# Patient Record
Sex: FEMALE | Race: WHITE | NOT HISPANIC OR LATINO
[De-identification: names, ages, dates, MRNs, and addresses within clinical notes are randomized per-mention and may not be internally consistent; named-entity substitution may affect disease eponyms.]

---

## 2017-05-02 ENCOUNTER — APPOINTMENT (OUTPATIENT)
Dept: SURGERY | Facility: CLINIC | Age: 57
End: 2017-05-02

## 2017-05-02 ENCOUNTER — LABORATORY RESULT (OUTPATIENT)
Age: 57
End: 2017-05-02

## 2017-05-02 VITALS
OXYGEN SATURATION: 97 % | HEIGHT: 61 IN | DIASTOLIC BLOOD PRESSURE: 76 MMHG | TEMPERATURE: 98.4 F | SYSTOLIC BLOOD PRESSURE: 114 MMHG | BODY MASS INDEX: 25.86 KG/M2 | WEIGHT: 137 LBS | HEART RATE: 72 BPM

## 2017-05-09 ENCOUNTER — OUTPATIENT (OUTPATIENT)
Dept: OUTPATIENT SERVICES | Facility: HOSPITAL | Age: 57
LOS: 1 days | End: 2017-05-09
Payer: MEDICAID

## 2017-05-09 ENCOUNTER — APPOINTMENT (OUTPATIENT)
Dept: CT IMAGING | Facility: HOSPITAL | Age: 57
End: 2017-05-09

## 2017-05-09 DIAGNOSIS — R22.1 LOCALIZED SWELLING, MASS AND LUMP, NECK: ICD-10-CM

## 2017-05-09 PROCEDURE — 70491 CT SOFT TISSUE NECK W/DYE: CPT

## 2017-05-23 ENCOUNTER — APPOINTMENT (OUTPATIENT)
Dept: SURGERY | Facility: CLINIC | Age: 57
End: 2017-05-23

## 2017-05-23 VITALS
BODY MASS INDEX: 25.86 KG/M2 | WEIGHT: 137 LBS | SYSTOLIC BLOOD PRESSURE: 110 MMHG | DIASTOLIC BLOOD PRESSURE: 68 MMHG | HEART RATE: 58 BPM | HEIGHT: 61 IN

## 2017-06-27 ENCOUNTER — APPOINTMENT (OUTPATIENT)
Dept: SURGERY | Facility: CLINIC | Age: 57
End: 2017-06-27

## 2017-07-11 ENCOUNTER — APPOINTMENT (OUTPATIENT)
Dept: MAMMOGRAPHY | Facility: IMAGING CENTER | Age: 57
End: 2017-07-11

## 2017-07-11 ENCOUNTER — APPOINTMENT (OUTPATIENT)
Dept: ULTRASOUND IMAGING | Facility: IMAGING CENTER | Age: 57
End: 2017-07-11

## 2017-07-11 ENCOUNTER — OUTPATIENT (OUTPATIENT)
Dept: OUTPATIENT SERVICES | Facility: HOSPITAL | Age: 57
LOS: 1 days | End: 2017-07-11
Payer: MEDICAID

## 2017-07-11 DIAGNOSIS — Z00.8 ENCOUNTER FOR OTHER GENERAL EXAMINATION: ICD-10-CM

## 2017-07-11 PROCEDURE — 77063 BREAST TOMOSYNTHESIS BI: CPT | Mod: 26

## 2017-07-11 PROCEDURE — 77063 BREAST TOMOSYNTHESIS BI: CPT

## 2017-07-11 PROCEDURE — G0202: CPT | Mod: 26

## 2017-07-11 PROCEDURE — 77067 SCR MAMMO BI INCL CAD: CPT

## 2017-07-18 ENCOUNTER — EMERGENCY (EMERGENCY)
Facility: HOSPITAL | Age: 57
LOS: 1 days | Discharge: HOME | End: 2017-07-18
Attending: EMERGENCY MEDICINE
Payer: MEDICAID

## 2017-07-18 VITALS
SYSTOLIC BLOOD PRESSURE: 148 MMHG | HEART RATE: 74 BPM | WEIGHT: 149.91 LBS | DIASTOLIC BLOOD PRESSURE: 71 MMHG | OXYGEN SATURATION: 99 % | RESPIRATION RATE: 15 BRPM | TEMPERATURE: 32 F
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
## 2017-07-18 DIAGNOSIS — T18.9XXA FOREIGN BODY OF ALIMENTARY TRACT, PART UNSPECIFIED, INITIAL ENCOUNTER: ICD-10-CM

## 2017-07-18 PROCEDURE — 99284 EMERGENCY DEPT VISIT MOD MDM: CPT | Mod: 25

## 2017-07-18 PROCEDURE — 71250 CT THORAX DX C-: CPT | Mod: 26

## 2017-07-18 PROCEDURE — 70490 CT SOFT TISSUE NECK W/O DYE: CPT | Mod: 26

## 2017-07-18 NOTE — ED PROVIDER NOTE - OBJECTIVE STATEMENT
58 y/o F with PMHx of breast cancer presents to ED c/o potential foreign body stuck in throat. Per daughter, pt was eating fish today when she swallowed a piece w/ a bone and described feeling like it was stuck and poking her. Pt tried drinking water and eating bread later, but could not relieve the feeling. Pt has not been able to swallow. Denies any vomiting or any other complaints. NKDA. 58 y/o F with PMHx of breast cancer presents to ED c/o potential foreign body stuck in throat. Per daughter, pt was eating fish today when she swallowed a piece w/ a bone and described feeling like it was stuck and poking her. Pt tried drinking water and eating bread later, but could not relieve the feeling. Pt has not been able to swallow without feeling this sensation. No drooling/ able to handle secretions, Denies any vomiting or any other complaints. NKDA.

## 2017-07-18 NOTE — ED ADULT NURSE NOTE - OBJECTIVE STATEMENT
PT P/W ESOGEAL FOREIGN she feels fish bone stuck and swallowed a big piece of bread to push it down and she feel stubbing sharp pain around chest area"PT P/W PT P/W ESOPHAGEAL FOREIGN BODY PT ATE FISH TONIGHT AND FEELS A BONE STUCK. SHE ATE BREAD AND WATER TO TRY AND PUSH IT DOWN WITH NO RELIEF .

## 2017-07-18 NOTE — ED PROVIDER NOTE - MEDICAL DECISION MAKING DETAILS
56 y/o F pt with potential foreign body in throat. Will CT-scan neck and chest and re-assess. 56 y/o F pt with potential foreign body in throat/esophagus. Will CT-scan neck and chest and re-assess, poss ENT or GI consult.

## 2017-07-18 NOTE — ED PROVIDER NOTE - PROGRESS NOTE DETAILS
Discussed CT finding w patient and her daughter. Fishbones already in stomach, so will likely just pass on their own. S/s likely scratch, will Rx medication for symptom relief, can follow up w GI prn

## 2017-07-18 NOTE — ED ADULT NURSE NOTE - CHIEF COMPLAINT QUOTE
as per daughter " she feels fish bone stuck and swallowed a big piece of bread to push it down and she feel stubbing sharp pain around chest area"

## 2017-07-18 NOTE — ED ADULT TRIAGE NOTE - CHIEF COMPLAINT QUOTE
as per daughter " she feels fish bone stuck and swallowed a big piece of bread to push it down and she she feel stubbing sharp pain around chest area" as per daughter " she feels fish bone stuck and swallowed a big piece of bread to push it down and she feel stubbing sharp pain around chest area"

## 2017-07-19 PROCEDURE — 70490 CT SOFT TISSUE NECK W/O DYE: CPT

## 2017-07-19 PROCEDURE — 99284 EMERGENCY DEPT VISIT MOD MDM: CPT

## 2017-07-19 PROCEDURE — 71250 CT THORAX DX C-: CPT

## 2017-07-19 RX ORDER — SUCRALFATE 1 G
10 TABLET ORAL
Qty: 280 | Refills: 0
Start: 2017-07-19 | End: 2017-07-26

## 2017-07-19 RX ORDER — SUCRALFATE 1 G
1 TABLET ORAL ONCE
Qty: 0 | Refills: 0 | Status: COMPLETED | OUTPATIENT
Start: 2017-07-19 | End: 2017-07-19

## 2017-07-19 RX ADMIN — Medication 30 MILLILITER(S): at 00:53

## 2017-08-10 ENCOUNTER — EMERGENCY (EMERGENCY)
Facility: HOSPITAL | Age: 57
LOS: 1 days | Discharge: HOME | End: 2017-08-10
Attending: EMERGENCY MEDICINE
Payer: MEDICAID

## 2017-08-10 VITALS
TEMPERATURE: 99 F | HEART RATE: 66 BPM | DIASTOLIC BLOOD PRESSURE: 76 MMHG | OXYGEN SATURATION: 98 % | RESPIRATION RATE: 18 BRPM | SYSTOLIC BLOOD PRESSURE: 135 MMHG

## 2017-08-10 DIAGNOSIS — Z85.3 PERSONAL HISTORY OF MALIGNANT NEOPLASM OF BREAST: ICD-10-CM

## 2017-08-10 DIAGNOSIS — M54.2 CERVICALGIA: ICD-10-CM

## 2017-08-10 DIAGNOSIS — R55 SYNCOPE AND COLLAPSE: ICD-10-CM

## 2017-08-10 DIAGNOSIS — E78.5 HYPERLIPIDEMIA, UNSPECIFIED: ICD-10-CM

## 2017-08-10 DIAGNOSIS — I10 ESSENTIAL (PRIMARY) HYPERTENSION: ICD-10-CM

## 2017-08-10 LAB
ALBUMIN SERPL ELPH-MCNC: 3.7 G/DL — SIGNIFICANT CHANGE UP (ref 3.5–5)
ALP SERPL-CCNC: 91 U/L — SIGNIFICANT CHANGE UP (ref 40–120)
ALT FLD-CCNC: 26 U/L DA — SIGNIFICANT CHANGE UP (ref 10–60)
ANION GAP SERPL CALC-SCNC: 3 MMOL/L — LOW (ref 5–17)
AST SERPL-CCNC: 14 U/L — SIGNIFICANT CHANGE UP (ref 10–40)
BILIRUB SERPL-MCNC: 0.3 MG/DL — SIGNIFICANT CHANGE UP (ref 0.2–1.2)
BUN SERPL-MCNC: 13 MG/DL — SIGNIFICANT CHANGE UP (ref 7–18)
CALCIUM SERPL-MCNC: 8.6 MG/DL — SIGNIFICANT CHANGE UP (ref 8.4–10.5)
CHLORIDE SERPL-SCNC: 97 MMOL/L — SIGNIFICANT CHANGE UP (ref 96–108)
CO2 SERPL-SCNC: 34 MMOL/L — HIGH (ref 22–31)
CREAT SERPL-MCNC: 0.67 MG/DL — SIGNIFICANT CHANGE UP (ref 0.5–1.3)
GLUCOSE SERPL-MCNC: 106 MG/DL — HIGH (ref 70–99)
HCT VFR BLD CALC: 38.3 % — SIGNIFICANT CHANGE UP (ref 34.5–45)
HGB BLD-MCNC: 12.9 G/DL — SIGNIFICANT CHANGE UP (ref 11.5–15.5)
MCHC RBC-ENTMCNC: 29.4 PG — SIGNIFICANT CHANGE UP (ref 27–34)
MCHC RBC-ENTMCNC: 33.6 GM/DL — SIGNIFICANT CHANGE UP (ref 32–36)
MCV RBC AUTO: 87.4 FL — SIGNIFICANT CHANGE UP (ref 80–100)
PLATELET # BLD AUTO: 222 K/UL — SIGNIFICANT CHANGE UP (ref 150–400)
POTASSIUM SERPL-MCNC: 3.1 MMOL/L — LOW (ref 3.5–5.3)
POTASSIUM SERPL-SCNC: 3.1 MMOL/L — LOW (ref 3.5–5.3)
PROT SERPL-MCNC: 7.2 G/DL — SIGNIFICANT CHANGE UP (ref 6–8.3)
RBC # BLD: 4.38 M/UL — SIGNIFICANT CHANGE UP (ref 3.8–5.2)
RBC # FLD: 12.1 % — SIGNIFICANT CHANGE UP (ref 10.3–14.5)
SODIUM SERPL-SCNC: 134 MMOL/L — LOW (ref 135–145)
TROPONIN I SERPL-MCNC: <0.015 NG/ML — SIGNIFICANT CHANGE UP (ref 0–0.04)
WBC # BLD: 4.5 K/UL — SIGNIFICANT CHANGE UP (ref 3.8–10.5)
WBC # FLD AUTO: 4.5 K/UL — SIGNIFICANT CHANGE UP (ref 3.8–10.5)

## 2017-08-10 PROCEDURE — 80053 COMPREHEN METABOLIC PANEL: CPT

## 2017-08-10 PROCEDURE — 99284 EMERGENCY DEPT VISIT MOD MDM: CPT | Mod: 25

## 2017-08-10 PROCEDURE — 99285 EMERGENCY DEPT VISIT HI MDM: CPT

## 2017-08-10 PROCEDURE — 36415 COLL VENOUS BLD VENIPUNCTURE: CPT

## 2017-08-10 PROCEDURE — 85027 COMPLETE CBC AUTOMATED: CPT

## 2017-08-10 PROCEDURE — 36416 COLLJ CAPILLARY BLOOD SPEC: CPT

## 2017-08-10 PROCEDURE — 84484 ASSAY OF TROPONIN QUANT: CPT

## 2017-08-10 PROCEDURE — 93005 ELECTROCARDIOGRAM TRACING: CPT

## 2017-08-10 PROCEDURE — 96374 THER/PROPH/DIAG INJ IV PUSH: CPT

## 2017-08-10 RX ORDER — KETOROLAC TROMETHAMINE 30 MG/ML
30 SYRINGE (ML) INJECTION ONCE
Qty: 0 | Refills: 0 | Status: DISCONTINUED | OUTPATIENT
Start: 2017-08-10 | End: 2017-08-10

## 2017-08-10 RX ORDER — SODIUM CHLORIDE 9 MG/ML
1000 INJECTION INTRAMUSCULAR; INTRAVENOUS; SUBCUTANEOUS ONCE
Qty: 0 | Refills: 0 | Status: COMPLETED | OUTPATIENT
Start: 2017-08-10 | End: 2017-08-10

## 2017-08-10 RX ORDER — POTASSIUM CHLORIDE 20 MEQ
80 PACKET (EA) ORAL ONCE
Qty: 0 | Refills: 0 | Status: COMPLETED | OUTPATIENT
Start: 2017-08-10 | End: 2017-08-10

## 2017-08-10 RX ADMIN — Medication 30 MILLIGRAM(S): at 09:23

## 2017-08-10 RX ADMIN — Medication 30 MILLIGRAM(S): at 09:22

## 2017-08-10 RX ADMIN — SODIUM CHLORIDE 4000 MILLILITER(S): 9 INJECTION INTRAMUSCULAR; INTRAVENOUS; SUBCUTANEOUS at 09:20

## 2017-08-10 NOTE — ED PROVIDER NOTE - OBJECTIVE STATEMENT
Pt offered  but prefers daughter to translate from [place]. 56 y/o female with PMHx of Breast Cancer, HTN, GERD, and HLD and PSHx of Appendectomy, and Tonsillectomy BIB EMS to the ED c/o syncopal episode today. Pt states she was walking to work when the back of her neck began to hurt and she fell down.  In ED, pt states the back of her neck still hurts. Pt states she had syncopal episode many years ago. Pt had Holter monitor which revealed a few stops, but was overall normal. Pt denies head trauma, calf pain, SOB, CP, nausea, weakness, or any other complaints. NKDA. Pt offered  but prefers daughter to translate from [place]er. 56 y/o female with PMHx of Breast Cancer, HTN, GERD, and HLD and PSHx of Appendectomy, and Tonsillectomy BIB EMS to the ED c/o syncopal episode today. Pt states she was walking to work when she stopped to talk with a friend. It was hot outside. She felt warm and nauseated. She then collapsed into the friend's arms. No head strike or LOC. Pt states she had syncopal episode many years ago. Pt had Holter monitor which was unremarkable. Pt denies head trauma, calf pain, SOB, CP, nausea, weakness, or any other complaints. NKDA.

## 2017-08-10 NOTE — ED PROVIDER NOTE - MEDICAL DECISION MAKING DETAILS
likely vasovagal syncopal episode  labs and ecg reassuring. pt feels well. discussed anticipatory guidance. pt and daughter at bedside. plan to follow up with cardiology in one week.

## 2017-08-10 NOTE — ED ADULT NURSE NOTE - OBJECTIVE STATEMENT
as per patient she was walking in the street and " passed out "  , pt aox3 states she has a spasm in her LT leg but denies pain currently , pt ambulatory in stable condition

## 2017-08-10 NOTE — ED ADULT TRIAGE NOTE - CHIEF COMPLAINT QUOTE
biba c/o felt weak , dizzy , passed out while walking in the street . ems reports pt c/o chest pain afterwards that resolved

## 2017-09-04 ENCOUNTER — EMERGENCY (EMERGENCY)
Facility: HOSPITAL | Age: 57
LOS: 1 days | Discharge: LEFT BEFORE DISCHARGE | End: 2017-09-04

## 2017-09-04 VITALS
DIASTOLIC BLOOD PRESSURE: 56 MMHG | OXYGEN SATURATION: 100 % | TEMPERATURE: 98 F | RESPIRATION RATE: 16 BRPM | HEIGHT: 62 IN | SYSTOLIC BLOOD PRESSURE: 139 MMHG | HEART RATE: 65 BPM

## 2017-09-04 DIAGNOSIS — M54.5 LOW BACK PAIN: ICD-10-CM

## 2017-09-04 DIAGNOSIS — Z53.21 PROCEDURE AND TREATMENT NOT CARRIED OUT DUE TO PATIENT LEAVING PRIOR TO BEING SEEN BY HEALTH CARE PROVIDER: ICD-10-CM

## 2017-09-04 NOTE — ED ADULT NURSE NOTE - CAS ED LWBS REASON YN
Patient called on phone #[phone]. RN spoke to patient in [place]an, confirmed that patient left hospital./no

## 2017-09-05 ENCOUNTER — TRANSCRIPTION ENCOUNTER (OUTPATIENT)
Age: 57
End: 2017-09-05

## 2018-01-30 ENCOUNTER — OUTPATIENT (OUTPATIENT)
Dept: OUTPATIENT SERVICES | Facility: HOSPITAL | Age: 58
LOS: 1 days | Discharge: HOME | End: 2018-01-30

## 2018-01-30 DIAGNOSIS — C50.919 MALIGNANT NEOPLASM OF UNSPECIFIED SITE OF UNSPECIFIED FEMALE BREAST: ICD-10-CM

## 2018-02-12 ENCOUNTER — RESULT REVIEW (OUTPATIENT)
Age: 58
End: 2018-02-12

## 2018-02-12 ENCOUNTER — OUTPATIENT (OUTPATIENT)
Dept: OUTPATIENT SERVICES | Facility: HOSPITAL | Age: 58
LOS: 1 days | Discharge: HOME | End: 2018-02-12

## 2018-02-12 ENCOUNTER — APPOINTMENT (OUTPATIENT)
Dept: HEMATOLOGY ONCOLOGY | Facility: CLINIC | Age: 58
End: 2018-02-12
Payer: MEDICAID

## 2018-02-12 VITALS
OXYGEN SATURATION: 98 % | RESPIRATION RATE: 16 BRPM | SYSTOLIC BLOOD PRESSURE: 122 MMHG | TEMPERATURE: 99.2 F | HEART RATE: 65 BPM | BODY MASS INDEX: 26.2 KG/M2 | DIASTOLIC BLOOD PRESSURE: 80 MMHG | HEIGHT: 62.13 IN | WEIGHT: 144.18 LBS

## 2018-02-12 DIAGNOSIS — C50.919 MALIGNANT NEOPLASM OF UNSPECIFIED SITE OF UNSPECIFIED FEMALE BREAST: ICD-10-CM

## 2018-02-12 PROCEDURE — XXXXX: CPT

## 2018-04-26 ENCOUNTER — EMERGENCY (EMERGENCY)
Facility: HOSPITAL | Age: 58
LOS: 1 days | Discharge: HOME | End: 2018-04-26
Attending: EMERGENCY MEDICINE
Payer: COMMERCIAL

## 2018-04-26 VITALS
DIASTOLIC BLOOD PRESSURE: 81 MMHG | SYSTOLIC BLOOD PRESSURE: 133 MMHG | OXYGEN SATURATION: 98 % | RESPIRATION RATE: 18 BRPM | TEMPERATURE: 99 F | HEART RATE: 66 BPM

## 2018-04-26 PROCEDURE — 93971 EXTREMITY STUDY: CPT

## 2018-04-26 PROCEDURE — 93971 EXTREMITY STUDY: CPT | Mod: 26,RT

## 2018-04-26 PROCEDURE — 99284 EMERGENCY DEPT VISIT MOD MDM: CPT | Mod: 25

## 2018-04-26 PROCEDURE — 99284 EMERGENCY DEPT VISIT MOD MDM: CPT

## 2018-04-26 NOTE — ED PROVIDER NOTE - MEDICAL DECISION MAKING DETAILS
59 y/o F p/w RLE pain, swelling, and numbness x 4 days. Plan: US r/o DVT, CT r/o METS and reassess. 57 y/o F p/w RLE pain, swelling, and numbness x 4 days. Plan: US r/o DVT, reassess.

## 2018-04-26 NOTE — ED PROVIDER NOTE - PMH
Breast cancer    GERD (gastroesophageal reflux disease)    HLD (hyperlipidemia)    HTN (hypertension) GI bleed

## 2018-04-26 NOTE — ED PROVIDER NOTE - OBJECTIVE STATEMENT
57 y/o F pt w/ PMHx of Breast CA (in remission), HLD, HTN, hysterectomy presents with RLE pain and swelling x4 days. pt reports gradual onset of RLE pain localized at the popliteal area and radiating posteriorly and laterally down the foot. Associated with intermittent numbness and swelling of the calf area. Denies any redness, streaking, fever, back pain, saddle anesthesia, urinary retention/incontinence, fecal incontinence , inability to walk, unwanted weight loss or any other complaints. Was seen by her PMD Dr. Galvin and had XRs done that showed arthritis. Scheduled for MRI tomorrow. Saw urgent care earlier today and was referred to ED for further eval. MARLEN. 57 y/o F pt w/ PMHx of Breast CA (in remission), HLD, HTN, hysterectomy presents with RLE pain and swelling x4 days. pt reports gradual onset of RLE pain localized at the popliteal area and radiating posteriorly and laterally down the foot. Associated with swelling and intermittent numbness of the calf area. Denies any redness, streaking, fever, back pain, saddle anesthesia, lower back pain, urinary retention/incontinence, fecal incontinence , inability to walk, unwanted weight loss or any other complaints. Was seen by her PMD Dr. Galvin and had XRs done that showed arthritis. Scheduled for MRI tomorrow. Saw urgent care earlier today and was referred to ED for further eval. MARLEN.

## 2018-04-26 NOTE — ED PROVIDER NOTE - CHPI ED SYMPTOMS NEG
no fever/no redness, no streaking, no saddle anesthesia, no urinary retention, no urinary incontinence, no fecal incontinence, no inability to walk, no weight loss/no back pain

## 2018-04-26 NOTE — ED ADULT NURSE NOTE - OBJECTIVE STATEMENT
Pt AOx3, ambulatory, c/o RLE pain and swelling since sunday, pt denies recent fall/trauma, no recent traveling. Pt endorses occasional numbness, Pt denies SOB, chest pain or difficulty breathing

## 2018-04-26 NOTE — ED PROVIDER NOTE - PHYSICAL EXAMINATION
No spinal/paraspinal tenderness, hip flexion knee flexion/extension 5/5 b/l. Plantar flexion 5/5 b/l. Unable to dorsiflex R big toe and unable to dorsiflex R foot, able to perform heel walk. +R calf tenderness, popliteal and DP/PT pulses 2+ b/l, significant varicose veins throughout LEs, cap refill <2 seconds, no discoloration or difference in temperature in both extremities.

## 2018-04-26 NOTE — ED PROVIDER NOTE - PROGRESS NOTE DETAILS
US shows no evidence of DVT. Discussed case with Neuro on call Dr Washington. Low suspicion of cord compression as cause of motor deficit. Likely affected peroneal nerve. Patient has good follow up with PMD and has MRI tomorrow. Discussed in length strict return instructions. Pt is well appearing walking with steady gait, stable for discharge and follow up without fail with medical doctor. I had a detailed discussion with the patient and/or guardian regarding the historical points, exam findings, and any diagnostic results supporting the discharge diagnosis. Pt educated on care and need for follow up. Strict return instructions and red flag signs and symptoms discussed with patient. Questions answered. Pt shows understanding of discharge information and agrees to follow.

## 2018-04-26 NOTE — ED ADULT NURSE NOTE - PMH
Breast cancer    GERD (gastroesophageal reflux disease)    HLD (hyperlipidemia)    HTN (hypertension)

## 2018-04-29 ENCOUNTER — TRANSCRIPTION ENCOUNTER (OUTPATIENT)
Age: 58
End: 2018-04-29

## 2018-08-29 ENCOUNTER — OUTPATIENT (OUTPATIENT)
Dept: OUTPATIENT SERVICES | Facility: HOSPITAL | Age: 58
LOS: 1 days | End: 2018-08-29
Payer: COMMERCIAL

## 2018-08-29 ENCOUNTER — APPOINTMENT (OUTPATIENT)
Dept: MAMMOGRAPHY | Facility: IMAGING CENTER | Age: 58
End: 2018-08-29
Payer: MEDICAID

## 2018-08-29 ENCOUNTER — APPOINTMENT (OUTPATIENT)
Dept: ULTRASOUND IMAGING | Facility: IMAGING CENTER | Age: 58
End: 2018-08-29
Payer: MEDICAID

## 2018-08-29 DIAGNOSIS — Z00.8 ENCOUNTER FOR OTHER GENERAL EXAMINATION: ICD-10-CM

## 2018-08-29 PROBLEM — I10 ESSENTIAL (PRIMARY) HYPERTENSION: Chronic | Status: ACTIVE | Noted: 2017-08-11

## 2018-08-29 PROBLEM — E78.5 HYPERLIPIDEMIA, UNSPECIFIED: Chronic | Status: ACTIVE | Noted: 2017-08-11

## 2018-08-29 PROBLEM — K21.9 GASTRO-ESOPHAGEAL REFLUX DISEASE WITHOUT ESOPHAGITIS: Chronic | Status: ACTIVE | Noted: 2017-08-11

## 2018-08-29 PROCEDURE — 76641 ULTRASOUND BREAST COMPLETE: CPT

## 2018-08-29 PROCEDURE — 77066 DX MAMMO INCL CAD BI: CPT | Mod: 26

## 2018-08-29 PROCEDURE — 77062 BREAST TOMOSYNTHESIS BI: CPT | Mod: 26

## 2018-08-29 PROCEDURE — G0279: CPT

## 2018-08-29 PROCEDURE — G0279: CPT | Mod: 26

## 2018-08-29 PROCEDURE — 77066 DX MAMMO INCL CAD BI: CPT

## 2018-08-29 PROCEDURE — 76641 ULTRASOUND BREAST COMPLETE: CPT | Mod: 26,50

## 2018-09-07 ENCOUNTER — OUTPATIENT (OUTPATIENT)
Dept: OUTPATIENT SERVICES | Facility: HOSPITAL | Age: 58
LOS: 1 days | End: 2018-09-07
Payer: COMMERCIAL

## 2018-09-07 ENCOUNTER — RESULT REVIEW (OUTPATIENT)
Age: 58
End: 2018-09-07

## 2018-09-07 ENCOUNTER — APPOINTMENT (OUTPATIENT)
Dept: ULTRASOUND IMAGING | Facility: IMAGING CENTER | Age: 58
End: 2018-09-07
Payer: MEDICAID

## 2018-09-07 DIAGNOSIS — Z00.8 ENCOUNTER FOR OTHER GENERAL EXAMINATION: ICD-10-CM

## 2018-09-07 PROCEDURE — A4648: CPT

## 2018-09-07 PROCEDURE — 19083 BX BREAST 1ST LESION US IMAG: CPT

## 2018-09-07 PROCEDURE — 88305 TISSUE EXAM BY PATHOLOGIST: CPT

## 2018-09-07 PROCEDURE — 88305 TISSUE EXAM BY PATHOLOGIST: CPT | Mod: 26

## 2018-09-07 PROCEDURE — 77065 DX MAMMO INCL CAD UNI: CPT | Mod: 26,LT

## 2018-09-07 PROCEDURE — 19083 BX BREAST 1ST LESION US IMAG: CPT | Mod: LT

## 2018-09-07 PROCEDURE — 77065 DX MAMMO INCL CAD UNI: CPT

## 2018-10-02 ENCOUNTER — APPOINTMENT (OUTPATIENT)
Dept: SURGERY | Facility: CLINIC | Age: 58
End: 2018-10-02
Payer: MEDICAID

## 2018-10-02 VITALS
HEIGHT: 62.13 IN | SYSTOLIC BLOOD PRESSURE: 143 MMHG | WEIGHT: 150 LBS | BODY MASS INDEX: 27.26 KG/M2 | DIASTOLIC BLOOD PRESSURE: 82 MMHG

## 2018-10-02 PROCEDURE — 99203 OFFICE O/P NEW LOW 30 MIN: CPT

## 2018-12-17 ENCOUNTER — TRANSCRIPTION ENCOUNTER (OUTPATIENT)
Age: 58
End: 2018-12-17

## 2018-12-17 ENCOUNTER — EMERGENCY (EMERGENCY)
Facility: HOSPITAL | Age: 58
LOS: 1 days | Discharge: HOME | End: 2018-12-17
Attending: EMERGENCY MEDICINE
Payer: COMMERCIAL

## 2018-12-17 VITALS
HEART RATE: 61 BPM | OXYGEN SATURATION: 98 % | TEMPERATURE: 98 F | SYSTOLIC BLOOD PRESSURE: 136 MMHG | DIASTOLIC BLOOD PRESSURE: 83 MMHG | RESPIRATION RATE: 17 BRPM | WEIGHT: 132.06 LBS

## 2018-12-17 LAB
ALBUMIN SERPL ELPH-MCNC: 3.7 G/DL — SIGNIFICANT CHANGE UP (ref 3.5–5)
ALP SERPL-CCNC: 103 U/L — SIGNIFICANT CHANGE UP (ref 40–120)
ALT FLD-CCNC: 21 U/L DA — SIGNIFICANT CHANGE UP (ref 10–60)
ANION GAP SERPL CALC-SCNC: 4 MMOL/L — LOW (ref 5–17)
APPEARANCE UR: CLEAR — SIGNIFICANT CHANGE UP
AST SERPL-CCNC: 16 U/L — SIGNIFICANT CHANGE UP (ref 10–40)
BASOPHILS # BLD AUTO: 0.1 K/UL — SIGNIFICANT CHANGE UP (ref 0–0.2)
BASOPHILS NFR BLD AUTO: 0.9 % — SIGNIFICANT CHANGE UP (ref 0–2)
BILIRUB SERPL-MCNC: 0.2 MG/DL — SIGNIFICANT CHANGE UP (ref 0.2–1.2)
BILIRUB UR-MCNC: NEGATIVE — SIGNIFICANT CHANGE UP
BUN SERPL-MCNC: 12 MG/DL — SIGNIFICANT CHANGE UP (ref 7–18)
CALCIUM SERPL-MCNC: 8.6 MG/DL — SIGNIFICANT CHANGE UP (ref 8.4–10.5)
CHLORIDE SERPL-SCNC: 103 MMOL/L — SIGNIFICANT CHANGE UP (ref 96–108)
CO2 SERPL-SCNC: 31 MMOL/L — SIGNIFICANT CHANGE UP (ref 22–31)
COLOR SPEC: YELLOW — SIGNIFICANT CHANGE UP
CREAT SERPL-MCNC: 0.66 MG/DL — SIGNIFICANT CHANGE UP (ref 0.5–1.3)
DIFF PNL FLD: ABNORMAL
EOSINOPHIL # BLD AUTO: 0.2 K/UL — SIGNIFICANT CHANGE UP (ref 0–0.5)
EOSINOPHIL NFR BLD AUTO: 3 % — SIGNIFICANT CHANGE UP (ref 0–6)
GLUCOSE SERPL-MCNC: 140 MG/DL — HIGH (ref 70–99)
GLUCOSE UR QL: NEGATIVE — SIGNIFICANT CHANGE UP
HCT VFR BLD CALC: 39.8 % — SIGNIFICANT CHANGE UP (ref 34.5–45)
HGB BLD-MCNC: 12.7 G/DL — SIGNIFICANT CHANGE UP (ref 11.5–15.5)
KETONES UR-MCNC: NEGATIVE — SIGNIFICANT CHANGE UP
LEUKOCYTE ESTERASE UR-ACNC: ABNORMAL
LIDOCAIN IGE QN: 132 U/L — SIGNIFICANT CHANGE UP (ref 73–393)
LYMPHOCYTES # BLD AUTO: 2.2 K/UL — SIGNIFICANT CHANGE UP (ref 1–3.3)
LYMPHOCYTES # BLD AUTO: 33.4 % — SIGNIFICANT CHANGE UP (ref 13–44)
MCHC RBC-ENTMCNC: 28.1 PG — SIGNIFICANT CHANGE UP (ref 27–34)
MCHC RBC-ENTMCNC: 31.8 GM/DL — LOW (ref 32–36)
MCV RBC AUTO: 88.5 FL — SIGNIFICANT CHANGE UP (ref 80–100)
MONOCYTES # BLD AUTO: 0.4 K/UL — SIGNIFICANT CHANGE UP (ref 0–0.9)
MONOCYTES NFR BLD AUTO: 5.7 % — SIGNIFICANT CHANGE UP (ref 2–14)
NEUTROPHILS # BLD AUTO: 3.7 K/UL — SIGNIFICANT CHANGE UP (ref 1.8–7.4)
NEUTROPHILS NFR BLD AUTO: 56.9 % — SIGNIFICANT CHANGE UP (ref 43–77)
NITRITE UR-MCNC: NEGATIVE — SIGNIFICANT CHANGE UP
PH UR: 6 — SIGNIFICANT CHANGE UP (ref 5–8)
PLATELET # BLD AUTO: 257 K/UL — SIGNIFICANT CHANGE UP (ref 150–400)
POTASSIUM SERPL-MCNC: 3.7 MMOL/L — SIGNIFICANT CHANGE UP (ref 3.5–5.3)
POTASSIUM SERPL-SCNC: 3.7 MMOL/L — SIGNIFICANT CHANGE UP (ref 3.5–5.3)
PROT SERPL-MCNC: 7.5 G/DL — SIGNIFICANT CHANGE UP (ref 6–8.3)
PROT UR-MCNC: 15
RBC # BLD: 4.5 M/UL — SIGNIFICANT CHANGE UP (ref 3.8–5.2)
RBC # FLD: 13.4 % — SIGNIFICANT CHANGE UP (ref 10.3–14.5)
SODIUM SERPL-SCNC: 138 MMOL/L — SIGNIFICANT CHANGE UP (ref 135–145)
SP GR SPEC: 1.01 — SIGNIFICANT CHANGE UP (ref 1.01–1.02)
UROBILINOGEN FLD QL: NEGATIVE — SIGNIFICANT CHANGE UP
WBC # BLD: 6.5 K/UL — SIGNIFICANT CHANGE UP (ref 3.8–10.5)
WBC # FLD AUTO: 6.5 K/UL — SIGNIFICANT CHANGE UP (ref 3.8–10.5)

## 2018-12-17 PROCEDURE — 81001 URINALYSIS AUTO W/SCOPE: CPT

## 2018-12-17 PROCEDURE — 87086 URINE CULTURE/COLONY COUNT: CPT

## 2018-12-17 PROCEDURE — 99284 EMERGENCY DEPT VISIT MOD MDM: CPT | Mod: 25

## 2018-12-17 PROCEDURE — 83690 ASSAY OF LIPASE: CPT

## 2018-12-17 PROCEDURE — 96374 THER/PROPH/DIAG INJ IV PUSH: CPT

## 2018-12-17 PROCEDURE — 76705 ECHO EXAM OF ABDOMEN: CPT

## 2018-12-17 PROCEDURE — 76705 ECHO EXAM OF ABDOMEN: CPT | Mod: 26

## 2018-12-17 PROCEDURE — 85027 COMPLETE CBC AUTOMATED: CPT

## 2018-12-17 PROCEDURE — 80053 COMPREHEN METABOLIC PANEL: CPT

## 2018-12-17 PROCEDURE — 99284 EMERGENCY DEPT VISIT MOD MDM: CPT

## 2018-12-17 RX ORDER — SODIUM CHLORIDE 9 MG/ML
1000 INJECTION INTRAMUSCULAR; INTRAVENOUS; SUBCUTANEOUS ONCE
Qty: 0 | Refills: 0 | Status: COMPLETED | OUTPATIENT
Start: 2018-12-17 | End: 2018-12-17

## 2018-12-17 RX ORDER — FAMOTIDINE 10 MG/ML
20 INJECTION INTRAVENOUS ONCE
Qty: 0 | Refills: 0 | Status: COMPLETED | OUTPATIENT
Start: 2018-12-17 | End: 2018-12-17

## 2018-12-17 RX ADMIN — SODIUM CHLORIDE 2000 MILLILITER(S): 9 INJECTION INTRAMUSCULAR; INTRAVENOUS; SUBCUTANEOUS at 16:22

## 2018-12-17 RX ADMIN — FAMOTIDINE 20 MILLIGRAM(S): 10 INJECTION INTRAVENOUS at 17:45

## 2018-12-17 RX ADMIN — SODIUM CHLORIDE 1000 MILLILITER(S): 9 INJECTION INTRAMUSCULAR; INTRAVENOUS; SUBCUTANEOUS at 17:45

## 2018-12-17 NOTE — ED PROVIDER NOTE - PHYSICAL EXAMINATION
Abd soft tender over epigastrum and RUQ, no CVA tenderness, Abd soft tender over epigastrum and RUQ, no CVA tenderness, -DIANN Davila MD

## 2018-12-17 NOTE — ED PROVIDER NOTE - OBJECTIVE STATEMENT
59 yo female with PMHx of HTN, HLD, and gastritis presents to ED with 3 days of epigastric pain that radiates to RUQ and right shoulder, patient denies nausea/vomiting, fever, chills endorses diarrhea. patient denies CP, dizziness or SOB. 57 yo female with PMHx of HTN, HLD, and gastritis presents to ED with 3 days of epigastric pain that radiates to RUQ and right shoulder, patient denies nausea/vomiting, fever, chills endorses diarrhea. patient denies CP, dizziness or SOB. HOMER Davila MD

## 2018-12-17 NOTE — ED ADULT NURSE NOTE - CHIEF COMPLAINT QUOTE
Pt with c/o RUQ abdominal pain radiating to back x 2 days sent from urgent care for eval to r/o acute abdomen

## 2018-12-17 NOTE — ED ADULT TRIAGE NOTE - CHIEF COMPLAINT QUOTE
Pt with c/o RUQ abdominal pain radiating to back x 2 days sent from urgent care for eval Pt with c/o RUQ abdominal pain radiating to back x 2 days sent from urgent care for eval to r/o acute abdomen

## 2018-12-17 NOTE — ED PROVIDER NOTE - PROGRESS NOTE DETAILS
Labs negative, will refer to GI. Pt is well appearing walking with steady gait, stable for discharge and follow up without fail with medical doctor. I had a detailed discussion with the patient and/or guardian regarding the historical points, exam findings, and any diagnostic results supporting the discharge diagnosis. Pt educated on care and need for follow up. Strict return instructions and red flag signs and symptoms discussed with patient. Questions answered. Pt shows understanding of discharge information and agrees to follow.

## 2018-12-17 NOTE — ED PROVIDER NOTE - MEDICAL DECISION MAKING DETAILS
Based on exam and history likely cholecystitis vs gastritis, will obtain labs, US and give fluids. Based on exam and history likely cholecystitis vs gastritis, will obtain labs, US and give fluids. HOMER Davila MD

## 2018-12-17 NOTE — ED ADULT NURSE NOTE - NSIMPLEMENTINTERV_GEN_ALL_ED
Implemented All Universal Safety Interventions:  [place] to call system. Call bell, personal items and telephone within reach. Instruct patient to call for assistance. Room bathroom lighting operational. Non-slip footwear when patient is off stretcher. Physically safe environment: no spills, clutter or unnecessary equipment. Stretcher in lowest position, wheels locked, appropriate side rails in place.

## 2018-12-18 LAB
CULTURE RESULTS: SIGNIFICANT CHANGE UP
SPECIMEN SOURCE: SIGNIFICANT CHANGE UP

## 2019-06-18 ENCOUNTER — OUTPATIENT (OUTPATIENT)
Dept: OUTPATIENT SERVICES | Facility: HOSPITAL | Age: 59
LOS: 1 days | Discharge: HOME | End: 2019-06-18

## 2019-06-18 DIAGNOSIS — C50.919 MALIGNANT NEOPLASM OF UNSPECIFIED SITE OF UNSPECIFIED FEMALE BREAST: ICD-10-CM

## 2019-06-21 ENCOUNTER — RESULT REVIEW (OUTPATIENT)
Age: 59
End: 2019-06-21

## 2019-06-21 ENCOUNTER — APPOINTMENT (OUTPATIENT)
Dept: HEMATOLOGY ONCOLOGY | Facility: CLINIC | Age: 59
End: 2019-06-21
Payer: COMMERCIAL

## 2019-06-21 VITALS
OXYGEN SATURATION: 99 % | HEART RATE: 64 BPM | WEIGHT: 145.5 LBS | RESPIRATION RATE: 16 BRPM | SYSTOLIC BLOOD PRESSURE: 121 MMHG | TEMPERATURE: 98 F | BODY MASS INDEX: 26.5 KG/M2 | DIASTOLIC BLOOD PRESSURE: 75 MMHG

## 2019-06-21 LAB
BASOPHILS # BLD AUTO: 0 K/UL — SIGNIFICANT CHANGE UP (ref 0–0.2)
BASOPHILS NFR BLD AUTO: 0.8 % — SIGNIFICANT CHANGE UP (ref 0–2)
EOSINOPHIL # BLD AUTO: 0.2 K/UL — SIGNIFICANT CHANGE UP (ref 0–0.5)
EOSINOPHIL NFR BLD AUTO: 3.1 % — SIGNIFICANT CHANGE UP (ref 0–6)
HCT VFR BLD CALC: 35.8 % — SIGNIFICANT CHANGE UP (ref 34.5–45)
HGB BLD-MCNC: 12.3 G/DL — SIGNIFICANT CHANGE UP (ref 11.5–15.5)
LYMPHOCYTES # BLD AUTO: 1.8 K/UL — SIGNIFICANT CHANGE UP (ref 1–3.3)
LYMPHOCYTES # BLD AUTO: 35.8 % — SIGNIFICANT CHANGE UP (ref 13–44)
MCHC RBC-ENTMCNC: 29.4 PG — SIGNIFICANT CHANGE UP (ref 27–34)
MCHC RBC-ENTMCNC: 34.5 G/DL — SIGNIFICANT CHANGE UP (ref 32–36)
MCV RBC AUTO: 85.3 FL — SIGNIFICANT CHANGE UP (ref 80–100)
MONOCYTES # BLD AUTO: 0.3 K/UL — SIGNIFICANT CHANGE UP (ref 0–0.9)
MONOCYTES NFR BLD AUTO: 6.2 % — SIGNIFICANT CHANGE UP (ref 2–14)
NEUTROPHILS # BLD AUTO: 2.8 K/UL — SIGNIFICANT CHANGE UP (ref 1.8–7.4)
NEUTROPHILS NFR BLD AUTO: 54.2 % — SIGNIFICANT CHANGE UP (ref 43–77)
PLATELET # BLD AUTO: 272 K/UL — SIGNIFICANT CHANGE UP (ref 150–400)
RBC # BLD: 4.2 M/UL — SIGNIFICANT CHANGE UP (ref 3.8–5.2)
RBC # FLD: 12.4 % — SIGNIFICANT CHANGE UP (ref 10.3–14.5)
WBC # BLD: 5.1 K/UL — SIGNIFICANT CHANGE UP (ref 3.8–10.5)
WBC # FLD AUTO: 5.1 K/UL — SIGNIFICANT CHANGE UP (ref 3.8–10.5)

## 2019-06-21 PROCEDURE — 99214 OFFICE O/P EST MOD 30 MIN: CPT

## 2019-06-24 NOTE — REVIEW OF SYSTEMS
[Night Sweats] : night sweats [Insomnia] : insomnia [Negative] : Allergic/Immunologic [Fever] : no fever [FreeTextEntry9] : right breast discomfort

## 2019-06-24 NOTE — PHYSICAL EXAM
[Fully active, able to carry on all pre-disease performance without restriction] : Status 0 - Fully active, able to carry on all pre-disease performance without restriction [Normal] : affect appropriate [de-identified] : fibrous thickening of skin on the apex of axillary region of right breast- dense, No mass, no nipple discharge.

## 2019-06-24 NOTE — ASSESSMENT
[FreeTextEntry1] : 59 year old female presents with a history of leukopenia.  No present on testing completed here. \par She has a history of triple negative breast cancer.  She underwent a lumpectomy with a sentinel lymph node biopsy, lesion was 2.2 cm- T2N0M0- invasive ductal carcinoma.  ER/NC negative, Her2 negative.  She was treated with 4 cycles of adjuvant TC along with neulasta support.  Her treatment with complicated by bone pains due to the neulasta.  She then received IMRT with 5000cGy in 25 fractions followed by boost to the cavity of 1000cGy in 5 fractions- this was done from 11/9/11-12/21/11.\par Concerned about her right breast symptoms with her history of breast ca.  Planned for urgent mammogram/sonogram- called to imaging who will fit her in for the next available and communicate this to her daughter.  \par She will call after testing to discuss results.  \par Otherwise she will follow up as needed. \par Recommended to continue routine health care maintenance as discussed.

## 2019-06-24 NOTE — HISTORY OF PRESENT ILLNESS
[de-identified] : Leukopenia- resolved.  The patient was diagnosed with triple negative breast cancer about 7 years ago.  She underwent a lumpectomy with a sentinel lymph node biopsy, lesion was 2.2 cm- T2N0M0- invasive ductal carcinoma.  ER/UT negative, Her2 negative.  She was treated with 4 cycles of adjuvant TC along with neulasta support.  Her treatment with complicated by bone pains due to the neulasta.  She then received IMRT with 5000cGy in 25 fractions followed by boost to the cavity of 1000cGy in 5 fractions- this was done from 11/9/11-12/21/11.  She has had occasional leukopenia, she was being followed by Dr. Julianne Wiggins.  She has had a history of bilateral neck masses, she underwent a left neck excision 6/7/2012 which revealed a lipomatous subcutaneous tissue.  She has noticed the right neck lesion has continued to grow over the last few years.  She complains of a right neck mass which has been present and increasing in size for the past two years. She underwent a biopsy with unremarkable results. She had a lipoma on the left side of her neck which was surgically removed. [de-identified] : Patient presents today for a follow up visit. She is accompanied by her daughter who acted as .  She notices more discomfort on the side of her right breast, at the site of prior RT.  She is unable to wear her bra comfortable.  She denies any palpable masses, no nipple discharge.  Her last mammogram/sonogram was in Aug 2018- she was noted to have a left complex cyst that was biopsied.  Results were c/w fibrocystic change, sclerosing adenosis, duct ectasia.  Otherwise she is doing well today. Denies fever/chills, visual changes, SOB or CLARK, chest pain, abdominal pain, n/v/d, no LE edema reported.\par

## 2019-06-24 NOTE — ADDENDUM
[FreeTextEntry1] : Documented by Satya Dooley acting as a scribe for Dr. Rupali Shrestha on 6/21/2019.\par \par All medical record entries made by the Scribe were at my, Dr. Rupali Shrestha's, direction and personally dictated by me on 6/21/2019. I have reviewed the chart and agree that  the record accurately reflects my personal performance of the history, physical exam, assessment and plan. I have also personally directed, reviewed, and agree with the discharge instructions.

## 2019-07-11 ENCOUNTER — APPOINTMENT (OUTPATIENT)
Dept: ULTRASOUND IMAGING | Facility: IMAGING CENTER | Age: 59
End: 2019-07-11

## 2019-07-11 ENCOUNTER — APPOINTMENT (OUTPATIENT)
Dept: MAMMOGRAPHY | Facility: IMAGING CENTER | Age: 59
End: 2019-07-11

## 2019-08-03 ENCOUNTER — APPOINTMENT (OUTPATIENT)
Dept: MAMMOGRAPHY | Facility: IMAGING CENTER | Age: 59
End: 2019-08-03

## 2019-08-03 ENCOUNTER — APPOINTMENT (OUTPATIENT)
Dept: ULTRASOUND IMAGING | Facility: IMAGING CENTER | Age: 59
End: 2019-08-03

## 2019-08-11 ENCOUNTER — FORM ENCOUNTER (OUTPATIENT)
Age: 59
End: 2019-08-11

## 2019-08-12 ENCOUNTER — APPOINTMENT (OUTPATIENT)
Dept: ULTRASOUND IMAGING | Facility: IMAGING CENTER | Age: 59
End: 2019-08-12
Payer: COMMERCIAL

## 2019-08-12 ENCOUNTER — OUTPATIENT (OUTPATIENT)
Dept: OUTPATIENT SERVICES | Facility: HOSPITAL | Age: 59
LOS: 1 days | End: 2019-08-12
Payer: COMMERCIAL

## 2019-08-12 ENCOUNTER — APPOINTMENT (OUTPATIENT)
Dept: MAMMOGRAPHY | Facility: IMAGING CENTER | Age: 59
End: 2019-08-12
Payer: COMMERCIAL

## 2019-08-12 DIAGNOSIS — C50.919 MALIGNANT NEOPLASM OF UNSPECIFIED SITE OF UNSPECIFIED FEMALE BREAST: ICD-10-CM

## 2019-08-12 PROCEDURE — 77066 DX MAMMO INCL CAD BI: CPT | Mod: 26

## 2019-08-12 PROCEDURE — 76641 ULTRASOUND BREAST COMPLETE: CPT

## 2019-08-12 PROCEDURE — 77062 BREAST TOMOSYNTHESIS BI: CPT | Mod: 26

## 2019-08-12 PROCEDURE — 76641 ULTRASOUND BREAST COMPLETE: CPT | Mod: 26,50

## 2019-08-12 PROCEDURE — G0279: CPT

## 2019-08-12 PROCEDURE — 77066 DX MAMMO INCL CAD BI: CPT

## 2019-10-28 ENCOUNTER — RESULT REVIEW (OUTPATIENT)
Age: 59
End: 2019-10-28

## 2020-03-11 ENCOUNTER — TRANSCRIPTION ENCOUNTER (OUTPATIENT)
Age: 60
End: 2020-03-11

## 2020-04-19 ENCOUNTER — EMERGENCY (EMERGENCY)
Facility: HOSPITAL | Age: 60
LOS: 1 days | Discharge: HOME | End: 2020-04-19
Attending: EMERGENCY MEDICINE
Payer: COMMERCIAL

## 2020-04-19 VITALS
OXYGEN SATURATION: 95 % | TEMPERATURE: 98 F | DIASTOLIC BLOOD PRESSURE: 101 MMHG | RESPIRATION RATE: 16 BRPM | SYSTOLIC BLOOD PRESSURE: 146 MMHG | HEART RATE: 77 BPM

## 2020-04-19 VITALS
HEART RATE: 62 BPM | RESPIRATION RATE: 18 BRPM | DIASTOLIC BLOOD PRESSURE: 66 MMHG | TEMPERATURE: 98 F | OXYGEN SATURATION: 98 % | SYSTOLIC BLOOD PRESSURE: 134 MMHG

## 2020-04-19 LAB
ALBUMIN SERPL ELPH-MCNC: 4.6 G/DL — SIGNIFICANT CHANGE UP (ref 3.3–5)
ALP SERPL-CCNC: 85 U/L — SIGNIFICANT CHANGE UP (ref 40–120)
ALT FLD-CCNC: 18 U/L — SIGNIFICANT CHANGE UP (ref 10–45)
ANION GAP SERPL CALC-SCNC: 13 MMOL/L — SIGNIFICANT CHANGE UP (ref 5–17)
AST SERPL-CCNC: 17 U/L — SIGNIFICANT CHANGE UP (ref 10–40)
BASOPHILS # BLD AUTO: 0.02 K/UL — SIGNIFICANT CHANGE UP (ref 0–0.2)
BASOPHILS NFR BLD AUTO: 0.4 % — SIGNIFICANT CHANGE UP (ref 0–2)
BILIRUB SERPL-MCNC: 0.3 MG/DL — SIGNIFICANT CHANGE UP (ref 0.2–1.2)
BUN SERPL-MCNC: 9 MG/DL — SIGNIFICANT CHANGE UP (ref 7–23)
CALCIUM SERPL-MCNC: 9.8 MG/DL — SIGNIFICANT CHANGE UP (ref 8.4–10.5)
CHLORIDE SERPL-SCNC: 100 MMOL/L — SIGNIFICANT CHANGE UP (ref 96–108)
CO2 SERPL-SCNC: 28 MMOL/L — SIGNIFICANT CHANGE UP (ref 22–31)
CREAT SERPL-MCNC: 0.52 MG/DL — SIGNIFICANT CHANGE UP (ref 0.5–1.3)
EOSINOPHIL # BLD AUTO: 0.07 K/UL — SIGNIFICANT CHANGE UP (ref 0–0.5)
EOSINOPHIL NFR BLD AUTO: 1.5 % — SIGNIFICANT CHANGE UP (ref 0–6)
GLUCOSE SERPL-MCNC: 110 MG/DL — HIGH (ref 70–99)
HCT VFR BLD CALC: 42.8 % — SIGNIFICANT CHANGE UP (ref 34.5–45)
HGB BLD-MCNC: 13.9 G/DL — SIGNIFICANT CHANGE UP (ref 11.5–15.5)
IMM GRANULOCYTES NFR BLD AUTO: 0.2 % — SIGNIFICANT CHANGE UP (ref 0–1.5)
LYMPHOCYTES # BLD AUTO: 1.93 K/UL — SIGNIFICANT CHANGE UP (ref 1–3.3)
LYMPHOCYTES # BLD AUTO: 41.8 % — SIGNIFICANT CHANGE UP (ref 13–44)
MAGNESIUM SERPL-MCNC: 2.4 MG/DL — SIGNIFICANT CHANGE UP (ref 1.6–2.6)
MCHC RBC-ENTMCNC: 28.1 PG — SIGNIFICANT CHANGE UP (ref 27–34)
MCHC RBC-ENTMCNC: 32.5 GM/DL — SIGNIFICANT CHANGE UP (ref 32–36)
MCV RBC AUTO: 86.5 FL — SIGNIFICANT CHANGE UP (ref 80–100)
MONOCYTES # BLD AUTO: 0.39 K/UL — SIGNIFICANT CHANGE UP (ref 0–0.9)
MONOCYTES NFR BLD AUTO: 8.4 % — SIGNIFICANT CHANGE UP (ref 2–14)
NEUTROPHILS # BLD AUTO: 2.2 K/UL — SIGNIFICANT CHANGE UP (ref 1.8–7.4)
NEUTROPHILS NFR BLD AUTO: 47.7 % — SIGNIFICANT CHANGE UP (ref 43–77)
NRBC # BLD: 0 /100 WBCS — SIGNIFICANT CHANGE UP (ref 0–0)
PLATELET # BLD AUTO: 282 K/UL — SIGNIFICANT CHANGE UP (ref 150–400)
POTASSIUM SERPL-MCNC: 4.3 MMOL/L — SIGNIFICANT CHANGE UP (ref 3.5–5.3)
POTASSIUM SERPL-SCNC: 4.3 MMOL/L — SIGNIFICANT CHANGE UP (ref 3.5–5.3)
PROT SERPL-MCNC: 7.8 G/DL — SIGNIFICANT CHANGE UP (ref 6–8.3)
RBC # BLD: 4.95 M/UL — SIGNIFICANT CHANGE UP (ref 3.8–5.2)
RBC # FLD: 12.8 % — SIGNIFICANT CHANGE UP (ref 10.3–14.5)
SODIUM SERPL-SCNC: 141 MMOL/L — SIGNIFICANT CHANGE UP (ref 135–145)
TROPONIN T, HIGH SENSITIVITY RESULT: <6 NG/L — SIGNIFICANT CHANGE UP (ref 0–51)
WBC # BLD: 4.62 K/UL — SIGNIFICANT CHANGE UP (ref 3.8–10.5)
WBC # FLD AUTO: 4.62 K/UL — SIGNIFICANT CHANGE UP (ref 3.8–10.5)

## 2020-04-19 PROCEDURE — 83690 ASSAY OF LIPASE: CPT

## 2020-04-19 PROCEDURE — 99284 EMERGENCY DEPT VISIT MOD MDM: CPT | Mod: 25

## 2020-04-19 PROCEDURE — 85027 COMPLETE CBC AUTOMATED: CPT

## 2020-04-19 PROCEDURE — 71275 CT ANGIOGRAPHY CHEST: CPT

## 2020-04-19 PROCEDURE — 70450 CT HEAD/BRAIN W/O DYE: CPT | Mod: 26

## 2020-04-19 PROCEDURE — 99285 EMERGENCY DEPT VISIT HI MDM: CPT

## 2020-04-19 PROCEDURE — 71045 X-RAY EXAM CHEST 1 VIEW: CPT | Mod: 26

## 2020-04-19 PROCEDURE — 71045 X-RAY EXAM CHEST 1 VIEW: CPT

## 2020-04-19 PROCEDURE — 74174 CTA ABD&PLVS W/CONTRAST: CPT

## 2020-04-19 PROCEDURE — 84484 ASSAY OF TROPONIN QUANT: CPT

## 2020-04-19 PROCEDURE — 83735 ASSAY OF MAGNESIUM: CPT

## 2020-04-19 PROCEDURE — 74174 CTA ABD&PLVS W/CONTRAST: CPT | Mod: 26

## 2020-04-19 PROCEDURE — 80053 COMPREHEN METABOLIC PANEL: CPT

## 2020-04-19 PROCEDURE — 93010 ELECTROCARDIOGRAM REPORT: CPT

## 2020-04-19 PROCEDURE — 70450 CT HEAD/BRAIN W/O DYE: CPT

## 2020-04-19 PROCEDURE — 82962 GLUCOSE BLOOD TEST: CPT

## 2020-04-19 PROCEDURE — 93005 ELECTROCARDIOGRAM TRACING: CPT

## 2020-04-19 PROCEDURE — 71275 CT ANGIOGRAPHY CHEST: CPT | Mod: 26

## 2020-04-19 NOTE — ED ADULT NURSE NOTE - OBJECTIVE STATEMENT
60 Y F HTN, HLD here stating that she called 911 because she was having L arm and leg pain, upon EMS arrival she was given 1 baby ASA. Pt states that  for 2 days in a row her blood pressure has been high. She says that when she woke up this morning she had pain and tingling in her left arm, she called her daughter and decided to come to the ED. She says that her shoulder and arm were also hurting. She also notes that she had a strong pain in her chest when the sensation started this morning. She denies SOB or fevers. She admits to some chills. She denies diaphoresis, nausea, vomiting. 18G IV in place in left ac, flushes well, no signs of infiltration. Will perform EKG and place on cardiac monitor. Pt is ambulatory with steady gait, continue fall risk precautions. Does not need any oxygen at this time. Fingerstick performed 111. Do not use band placed on right arm for past lumpectomy.

## 2020-04-19 NOTE — ED PROVIDER NOTE - CLINICAL SUMMARY MEDICAL DECISION MAKING FREE TEXT BOX
Attn - pt with pain in back, chest and left arm.  R/o dissection v ACS v cervical radiculopathy.  CTA aorta, EKG, CXR, labs. reassess.

## 2020-04-19 NOTE — ED ADULT NURSE NOTE - CAS DISC DELAYS
[address] [address] [address]  Dept: [phone]  Dept PUC:[phone]    Yara Guardado is a 58 y.o. female who presents today for her medical conditions/complaints as notedbelow. Yara Guardado is c/o of Hypertension and Hyperlipidemia      HPI:     Hypertension   This is a chronic problem. The current episode started today. The problem is unchanged. The problem is controlled. Pertinent negatives include no anxiety, blurred vision, chest pain, headaches, malaise/fatigue, neck pain, orthopnea, palpitations, peripheral edema, PND, shortness of breath or sweats. There are no associated agents to hypertension. There are no known risk factors for coronary artery disease. Past treatments include beta blockers and diuretics. The current treatment provides mild improvement. There are no compliance problems. Hyperlipidemia   Pertinent negatives include no chest pain or shortness of breath. Had been on the lipitor and tolerating it well but the fall labs showed marked elevation of the liver tests. Repeat labs this spring were all back WNL. Has been tryying to watch their diet.      BP Readings from Last 3 Encounters:   05/30/19 104/78   11/01/18 122/76   10/04/18 124/82          (goal 120/80)    Wt Readings from Last 3 Encounters:   05/30/19 166 lb (75.3 kg)   11/01/18 167 lb (75.8 kg)   10/04/18 167 lb (75.8 kg)        Past Medical History:   Diagnosis Date    Hyperlipidemia     Hypertension     Hyperthyroidism     IFG (impaired fasting glucose)     Migraine     unspecified not intractable with out status migrainosus     Mixed hyperlipidemia 10/4/2018      Past Surgical History:   Procedure Laterality Date    CHOLECYSTECTOMY  1980    OVARIAN CYST DRAINAGE      SPLENECTOMY  2001       Family History   Problem Relation Age of Onset    Heart Disease Mother     Osteoporosis Mother     Other Mother         blood disorder    Heart Attack continue with this and recheck in 1 year. Lab Results   Component Value Date    CHOL 202 (H) 09/09/2017    TRIG 177 09/09/2017    HDL 58 02/15/2016    ALT 36 03/01/2019    AST 31 03/01/2019     09/09/2017    K 3.5 (L) 09/09/2017     09/09/2017    CREATININE 0.7 09/09/2017    BUN 14 09/09/2017    CO2 30 09/09/2017    TSH 1.66 09/09/2017       Return in about 6 months (around 11/30/2019) for Pap/ well woman. Patient given educational materials - see patientinstructions. Discussed use, benefit, and side effects of prescribed medications. All patient questions answered. Pt voiced understanding. Reviewed health maintenance. Instructed to continue current medications, diet andexercise. Patient agreed with treatment plan. Follow up as directed.      Electronically signed by Gui Bah MD on 6/2/2019 Waiting private transportation

## 2020-04-19 NOTE — ED PROVIDER NOTE - OBJECTIVE STATEMENT
: [id number] - - 60 Y F HTN, HLD here stating that she called 911 because she was having L arm and leg pain, upon EMS arrival she was given 1 baby ASA. Pt states that  for 2 days in a row her blood pressure has been high. She says that when she woke up this morning she had pain and tingling in her left arm, she called her daughter and decided to come to the ED. She says that her shoulder and arm were also hurting. She also notes that she had a strong pain in her chest when the sensation started this morning. She denies SOB or fevers. She admits to some chills. She denies diaphoresis, nausea, vomiting. : [id number] - - 60 Y F HTN, HLD here stating that she called 911 because she was having L arm and leg pain, upon EMS arrival she was given 1 baby ASA. Pt states that  for 2 days in a row her blood pressure has been high. She says that when she woke up this morning she had pain and tingling in her left arm, she called her daughter and decided to come to the ED. She says that her shoulder and arm were also hurting. She also notes that she had a strong pain in her chest when the sensation started this morning. She denies SOB or fevers. She admits to some chills. She denies diaphoresis, nausea, vomiting.      Attn- pt seen in [facility] - agree with above - hx taken simultaneously.  pt c/o pain in back by left scapula, left arm and left chest.  pt had stopped taking BP meds for 3 months and restarted recently.  pt also c/o slight palp.  NO: fever, cough, sob, n/v/d.  PMHx - HTN, hypercholesterol, Breast CA.  Pt given ASA by EMS and pain much better and almost gone now. : [id number] - - 60 Y F HTN, HLD here stating that she called 911 because she was having L arm and leg pain, upon EMS arrival she was given 1 baby ASA. Pt states that  for 2 days in a row her blood pressure has been high. She says that when she woke up this morning she had pain and tingling in her left arm, she called her daughter and decided to come to the ED. She says that her neck and L arm were also hurting and that her arm felt week. She also notes that she had a strong pain in her chest when the sensation started this morning. stopped taking her BP meds 3 months ago bc it made her feel sick, restarted this week. also not taking her cholesterol medications. currently with very mild dull pain in the chest. She denies SOB or fevers. She admits to some chills. She denies diaphoresis, nausea, vomiting.       Attn- pt seen in [facility] - agree with above - hx taken simultaneously.  pt c/o pain in back by left scapula, left arm and left chest.  pt had stopped taking BP meds for 3 months and restarted recently.  pt also c/o slight palp.  NO: fever, cough, sob, n/v/d.  PMHx - HTN, hypercholesterol, Breast CA.  Pt given ASA by EMS and pain much better and almost gone now.

## 2020-04-19 NOTE — ED PROVIDER NOTE - NSFOLLOWUPINSTRUCTIONS_ED_ALL_ED_FT
Radiology recommends repeat CT scan of chest in 6 months to evaluate nodes seen on chest CT today. you were seen in the emergency department today for chest pain and           Radiology recommends repeat CT scan of chest in 6 months to evaluate nodes seen on chest CT today. -you were seen in the emergency department today for chest pain and left arm weakness.     -you had lab work and imaging performed that didn't show any emergent findings.     -you had a CT scan of your chest which Bilateral small/borderline hilar lymph nodes and small prevascular lymph node and recommended a repeat CT scan in 6 months. please follow up with your primary care doctor and discuss these results.     - Please call your doctor to follow up in the next 1-2 days.     -Return to the ED if you have any worsening or new symptoms including but not limited to the following: worsening chest pain, coughing up blood, unexplained back/neck/jaw pain, severe abdominal pain, dizziness or lightheadedness, weakness, facial droop, slurred speech,  fainting, shortness of breath, sweaty or clammy skin, vomiting, or racing heart beat. These symptoms may represent a serious problem that is an emergency. Do not wait to see if the symptoms will go away. Get medical help right away. Call 911 and do not drive yourself to the hospital.

## 2020-04-19 NOTE — ED PROVIDER NOTE - NEUROLOGICAL, MLM
Alert and oriented, no focal deficits, no motor or sensory deficits. Alert and oriented, no focal deficits, no motor or sensory deficits. decreased effort on strength testing. cranial nerves intact

## 2020-04-19 NOTE — ED PROVIDER NOTE - PROGRESS NOTE DETAILS
Pt states that she is still having some dull pain in her chest and arm but that it hasn't been as strong as it had been this AM. a little discomfort in her abdomen but thinks it is because she is hungry and hasn't eaten anything today. Wants to go home, states that she will call her doctor today. discussed CT findings and recommendations for repeat CT in 6 months. patient relays her understanding.

## 2020-04-19 NOTE — ED PROVIDER NOTE - PATIENT PORTAL LINK FT
You can access the FollowMyHealth Patient Portal offered by [facility] by registering at the following website: [email or web address]. By joining [facility]’s FollowMyHealth portal, you will also be able to view your health information using other applications (apps) compatible with our system.

## 2020-06-09 NOTE — ED PROVIDER NOTE - CARDIAC, MLM
Yes that's fine. Unless he wants someone to see her tomorrow. Also, let dad know I called several times between #;05 and 3:40 pm- each time I got a busy signal as if phone was off the hook. Please ask for an alternate number in case this happens again ( they don't have my chart so am unsure if this will video or phone visit)   Normal rate, regular rhythm.  Heart sounds S1, S2.  No murmurs, rubs or gallops.

## 2020-06-29 ENCOUNTER — OUTPATIENT (OUTPATIENT)
Dept: OUTPATIENT SERVICES | Facility: HOSPITAL | Age: 60
LOS: 1 days | Discharge: HOME | End: 2020-06-29

## 2020-06-29 DIAGNOSIS — C50.919 MALIGNANT NEOPLASM OF UNSPECIFIED SITE OF UNSPECIFIED FEMALE BREAST: ICD-10-CM

## 2020-07-06 ENCOUNTER — APPOINTMENT (OUTPATIENT)
Dept: HEMATOLOGY ONCOLOGY | Facility: CLINIC | Age: 60
End: 2020-07-06
Payer: COMMERCIAL

## 2020-07-06 ENCOUNTER — RESULT REVIEW (OUTPATIENT)
Age: 60
End: 2020-07-06

## 2020-07-06 VITALS
HEART RATE: 67 BPM | DIASTOLIC BLOOD PRESSURE: 84 MMHG | SYSTOLIC BLOOD PRESSURE: 139 MMHG | TEMPERATURE: 97.8 F | BODY MASS INDEX: 24.22 KG/M2 | RESPIRATION RATE: 16 BRPM | WEIGHT: 133 LBS | OXYGEN SATURATION: 100 %

## 2020-07-06 LAB
BASOPHILS # BLD AUTO: 0.02 K/UL — SIGNIFICANT CHANGE UP (ref 0–0.2)
BASOPHILS NFR BLD AUTO: 0.3 % — SIGNIFICANT CHANGE UP (ref 0–2)
EOSINOPHIL # BLD AUTO: 0.07 K/UL — SIGNIFICANT CHANGE UP (ref 0–0.5)
EOSINOPHIL NFR BLD AUTO: 1.2 % — SIGNIFICANT CHANGE UP (ref 0–6)
HCT VFR BLD CALC: 37.5 % — SIGNIFICANT CHANGE UP (ref 34.5–45)
HGB BLD-MCNC: 12.9 G/DL — SIGNIFICANT CHANGE UP (ref 11.5–15.5)
IMM GRANULOCYTES NFR BLD AUTO: 1.2 % — SIGNIFICANT CHANGE UP (ref 0–1.5)
LYMPHOCYTES # BLD AUTO: 1.93 K/UL — SIGNIFICANT CHANGE UP (ref 1–3.3)
LYMPHOCYTES # BLD AUTO: 32.9 % — SIGNIFICANT CHANGE UP (ref 13–44)
MCHC RBC-ENTMCNC: 29.5 PG — SIGNIFICANT CHANGE UP (ref 27–34)
MCHC RBC-ENTMCNC: 34.4 GM/DL — SIGNIFICANT CHANGE UP (ref 32–36)
MCV RBC AUTO: 85.6 FL — SIGNIFICANT CHANGE UP (ref 80–100)
MONOCYTES # BLD AUTO: 0.5 K/UL — SIGNIFICANT CHANGE UP (ref 0–0.9)
MONOCYTES NFR BLD AUTO: 8.5 % — SIGNIFICANT CHANGE UP (ref 2–14)
NEUTROPHILS # BLD AUTO: 3.28 K/UL — SIGNIFICANT CHANGE UP (ref 1.8–7.4)
NEUTROPHILS NFR BLD AUTO: 55.9 % — SIGNIFICANT CHANGE UP (ref 43–77)
NRBC # BLD: 0 /100 WBCS — SIGNIFICANT CHANGE UP (ref 0–0)
PLATELET # BLD AUTO: 291 K/UL — SIGNIFICANT CHANGE UP (ref 150–400)
RBC # BLD: 4.38 M/UL — SIGNIFICANT CHANGE UP (ref 3.8–5.2)
RBC # FLD: 13.2 % — SIGNIFICANT CHANGE UP (ref 10.3–14.5)
WBC # BLD: 5.87 K/UL — SIGNIFICANT CHANGE UP (ref 3.8–10.5)
WBC # FLD AUTO: 5.87 K/UL — SIGNIFICANT CHANGE UP (ref 3.8–10.5)

## 2020-07-06 PROCEDURE — 99213 OFFICE O/P EST LOW 20 MIN: CPT

## 2020-07-06 NOTE — REVIEW OF SYSTEMS
[Fever] : no fever [Chills] : no chills [Fatigue] : no fatigue [Night Sweats] : no night sweats [Abdominal Pain] : abdominal pain [Recent Change In Weight] : ~T recent weight change [Vomiting] : no vomiting [Constipation] : no constipation [Diarrhea] : no diarrhea [Negative] : Heme/Lymph [FreeTextEntry2] : weight loss

## 2020-07-06 NOTE — ASSESSMENT
[FreeTextEntry1] : 60 year old female presents with a history of leukopenia.  No present on testing completed here. \par She has a history of triple negative breast cancer.  She underwent a lumpectomy with a sentinel lymph node biopsy, lesion was 2.2 cm- T2N0M0- invasive ductal carcinoma.  ER/[place] negative, Her2 negative.  She was treated with 4 cycles of adjuvant TC along with neulasta support.  Her treatment with complicated by bone pains due to the neulasta.  She then received IMRT with 5000cGy in 25 fractions followed by boost to the cavity of 1000cGy in 5 fractions- this was done from 11/9/11-12/21/11.\par Due for breast screening in August- RX given. \par UTD with colonoscopy- 12/2016.\par She will call with her physician's numbers/scan results.  She is scheduled to see her PCP today whom the daughter states will address her GI issues.  She is also recommended to follow up with her PCP.

## 2020-07-06 NOTE — HISTORY OF PRESENT ILLNESS
[de-identified] : Leukopenia- resolved.  The patient was diagnosed with triple negative breast cancer about 7 years ago.  She underwent a lumpectomy with a sentinel lymph node biopsy, lesion was 2.2 cm- T2N0M0- invasive ductal carcinoma.  ER/[place] negative, Her2 negative.  She was treated with 4 cycles of adjuvant TC along with neulasta support.  Her treatment with complicated by bone pains due to the neulasta.  She then received IMRT with 5000cGy in 25 fractions followed by boost to the cavity of 1000cGy in 5 fractions- this was done from 11/9/11-12/21/11.  She has had occasional leukopenia, she was being followed by Dr. Julianne Wiggins.  She has had a history of bilateral neck masses, she underwent a left neck excision 6/7/2012 which revealed a lipomatous subcutaneous tissue.  She has noticed the right neck lesion has continued to grow over the last few years.  She complains of a right neck mass which has been present and increasing in size for the past two years. She underwent a biopsy with unremarkable results. She had a lipoma on the left side of her neck which was surgically removed. [de-identified] : Patient presents today for a follow up visit. Her daughter is present via [employer] .  She overall feels unchanged.  She has chronic abdominal discomfort that she has been following with GI/PCP for, with and without eating.  She has had a work up that included an EGD, CT scan.  She is told that it may be due to a herniated disk but has not had any recent imaging for it.  She cannot recall her physicians that she is seeing.  Denies fever/chills, visual changes, SOB or CLARK, chest pain, abdominal pain, n/v/d.  Her diet is not good, has lost weight due to inability to eat.  \par

## 2020-07-06 NOTE — PHYSICAL EXAM
[Fully active, able to carry on all pre-disease performance without restriction] : Status 0 - Fully active, able to carry on all pre-disease performance without restriction [Normal] : affect appropriate [de-identified] : fibrous thickening of skin on the apex of axillary region of right breast- dense, No mass, no nipple discharge.

## 2020-07-09 ENCOUNTER — EMERGENCY (EMERGENCY)
Facility: HOSPITAL | Age: 60
LOS: 1 days | Discharge: HOME | End: 2020-07-09
Attending: STUDENT IN AN ORGANIZED HEALTH CARE EDUCATION/TRAINING PROGRAM
Payer: COMMERCIAL

## 2020-07-09 VITALS
WEIGHT: 132.94 LBS | OXYGEN SATURATION: 100 % | SYSTOLIC BLOOD PRESSURE: 164 MMHG | HEART RATE: 67 BPM | HEIGHT: 60 IN | RESPIRATION RATE: 18 BRPM | DIASTOLIC BLOOD PRESSURE: 95 MMHG | TEMPERATURE: 98 F

## 2020-07-09 LAB
ALBUMIN SERPL ELPH-MCNC: 4.6 G/DL — SIGNIFICANT CHANGE UP (ref 3.3–5)
ALP SERPL-CCNC: 100 U/L — SIGNIFICANT CHANGE UP (ref 40–120)
ALT FLD-CCNC: 19 U/L — SIGNIFICANT CHANGE UP (ref 10–45)
ANION GAP SERPL CALC-SCNC: 11 MMOL/L — SIGNIFICANT CHANGE UP (ref 5–17)
APPEARANCE UR: CLEAR — SIGNIFICANT CHANGE UP
AST SERPL-CCNC: 22 U/L — SIGNIFICANT CHANGE UP (ref 10–40)
BASOPHILS # BLD AUTO: 0.02 K/UL — SIGNIFICANT CHANGE UP (ref 0–0.2)
BASOPHILS NFR BLD AUTO: 0.4 % — SIGNIFICANT CHANGE UP (ref 0–2)
BILIRUB SERPL-MCNC: 0.2 MG/DL — SIGNIFICANT CHANGE UP (ref 0.2–1.2)
BILIRUB UR-MCNC: NEGATIVE — SIGNIFICANT CHANGE UP
BUN SERPL-MCNC: 14 MG/DL — SIGNIFICANT CHANGE UP (ref 7–23)
CALCIUM SERPL-MCNC: 9.2 MG/DL — SIGNIFICANT CHANGE UP (ref 8.4–10.5)
CHLORIDE SERPL-SCNC: 93 MMOL/L — LOW (ref 96–108)
CO2 SERPL-SCNC: 24 MMOL/L — SIGNIFICANT CHANGE UP (ref 22–31)
COLOR SPEC: COLORLESS — SIGNIFICANT CHANGE UP
CREAT SERPL-MCNC: 0.58 MG/DL — SIGNIFICANT CHANGE UP (ref 0.5–1.3)
DIFF PNL FLD: NEGATIVE — SIGNIFICANT CHANGE UP
EOSINOPHIL # BLD AUTO: 0.06 K/UL — SIGNIFICANT CHANGE UP (ref 0–0.5)
EOSINOPHIL NFR BLD AUTO: 1.1 % — SIGNIFICANT CHANGE UP (ref 0–6)
GLUCOSE SERPL-MCNC: 101 MG/DL — HIGH (ref 70–99)
GLUCOSE UR QL: NEGATIVE — SIGNIFICANT CHANGE UP
HCT VFR BLD CALC: 35.1 % — SIGNIFICANT CHANGE UP (ref 34.5–45)
HGB BLD-MCNC: 11.5 G/DL — SIGNIFICANT CHANGE UP (ref 11.5–15.5)
IMM GRANULOCYTES NFR BLD AUTO: 0.4 % — SIGNIFICANT CHANGE UP (ref 0–1.5)
KETONES UR-MCNC: NEGATIVE — SIGNIFICANT CHANGE UP
LEUKOCYTE ESTERASE UR-ACNC: ABNORMAL
LIDOCAIN IGE QN: 38 U/L — SIGNIFICANT CHANGE UP (ref 7–60)
LYMPHOCYTES # BLD AUTO: 1.55 K/UL — SIGNIFICANT CHANGE UP (ref 1–3.3)
LYMPHOCYTES # BLD AUTO: 29.2 % — SIGNIFICANT CHANGE UP (ref 13–44)
MCHC RBC-ENTMCNC: 28.5 PG — SIGNIFICANT CHANGE UP (ref 27–34)
MCHC RBC-ENTMCNC: 32.8 GM/DL — SIGNIFICANT CHANGE UP (ref 32–36)
MCV RBC AUTO: 86.9 FL — SIGNIFICANT CHANGE UP (ref 80–100)
MONOCYTES # BLD AUTO: 0.5 K/UL — SIGNIFICANT CHANGE UP (ref 0–0.9)
MONOCYTES NFR BLD AUTO: 9.4 % — SIGNIFICANT CHANGE UP (ref 2–14)
NEUTROPHILS # BLD AUTO: 3.16 K/UL — SIGNIFICANT CHANGE UP (ref 1.8–7.4)
NEUTROPHILS NFR BLD AUTO: 59.5 % — SIGNIFICANT CHANGE UP (ref 43–77)
NITRITE UR-MCNC: NEGATIVE — SIGNIFICANT CHANGE UP
NRBC # BLD: 0 /100 WBCS — SIGNIFICANT CHANGE UP (ref 0–0)
PH UR: 7 — SIGNIFICANT CHANGE UP (ref 5–8)
PLATELET # BLD AUTO: 267 K/UL — SIGNIFICANT CHANGE UP (ref 150–400)
POTASSIUM SERPL-MCNC: 4.5 MMOL/L — SIGNIFICANT CHANGE UP (ref 3.5–5.3)
POTASSIUM SERPL-SCNC: 4.5 MMOL/L — SIGNIFICANT CHANGE UP (ref 3.5–5.3)
PROT SERPL-MCNC: 7.2 G/DL — SIGNIFICANT CHANGE UP (ref 6–8.3)
PROT UR-MCNC: NEGATIVE — SIGNIFICANT CHANGE UP
RBC # BLD: 4.04 M/UL — SIGNIFICANT CHANGE UP (ref 3.8–5.2)
RBC # FLD: 13.3 % — SIGNIFICANT CHANGE UP (ref 10.3–14.5)
SODIUM SERPL-SCNC: 128 MMOL/L — LOW (ref 135–145)
SP GR SPEC: 1 — LOW (ref 1.01–1.02)
TROPONIN T, HIGH SENSITIVITY RESULT: <6 NG/L — SIGNIFICANT CHANGE UP (ref 0–51)
UROBILINOGEN FLD QL: NEGATIVE — SIGNIFICANT CHANGE UP
WBC # BLD: 5.31 K/UL — SIGNIFICANT CHANGE UP (ref 3.8–10.5)
WBC # FLD AUTO: 5.31 K/UL — SIGNIFICANT CHANGE UP (ref 3.8–10.5)

## 2020-07-09 PROCEDURE — 99285 EMERGENCY DEPT VISIT HI MDM: CPT

## 2020-07-09 PROCEDURE — 93010 ELECTROCARDIOGRAM REPORT: CPT

## 2020-07-09 RX ORDER — FAMOTIDINE 10 MG/ML
20 INJECTION INTRAVENOUS ONCE
Refills: 0 | Status: COMPLETED | OUTPATIENT
Start: 2020-07-09 | End: 2020-07-09

## 2020-07-09 RX ORDER — SODIUM CHLORIDE 9 MG/ML
1000 INJECTION INTRAMUSCULAR; INTRAVENOUS; SUBCUTANEOUS ONCE
Refills: 0 | Status: DISCONTINUED | OUTPATIENT
Start: 2020-07-09 | End: 2020-07-09

## 2020-07-09 RX ORDER — MORPHINE SULFATE 50 MG/1
4 CAPSULE, EXTENDED RELEASE ORAL ONCE
Refills: 0 | Status: DISCONTINUED | OUTPATIENT
Start: 2020-07-09 | End: 2020-07-09

## 2020-07-09 RX ORDER — SODIUM CHLORIDE 9 MG/ML
1000 INJECTION INTRAMUSCULAR; INTRAVENOUS; SUBCUTANEOUS ONCE
Refills: 0 | Status: COMPLETED | OUTPATIENT
Start: 2020-07-09 | End: 2020-07-09

## 2020-07-09 RX ADMIN — SODIUM CHLORIDE 1000 MILLILITER(S): 9 INJECTION INTRAMUSCULAR; INTRAVENOUS; SUBCUTANEOUS at 22:12

## 2020-07-09 RX ADMIN — FAMOTIDINE 20 MILLIGRAM(S): 10 INJECTION INTRAVENOUS at 22:37

## 2020-07-09 RX ADMIN — Medication 30 MILLILITER(S): at 22:36

## 2020-07-09 NOTE — ED ADULT NURSE REASSESSMENT NOTE - NS ED NURSE REASSESS COMMENT FT1
Pt resting quietly on stretcher in no distress. Safety and comfort measures provided, bed locked and in lowest position, side rails up for safety. Call bell within reach. Awaiting radiology testing

## 2020-07-09 NOTE — ED ADULT NURSE NOTE - OBJECTIVE STATEMENT
60 year old female pt primarily [place] citizen speaking with minimal english,evaluation was done with the ipad   presented to the ED co abd pain to going across abd with pain and distention since last friday, pt states h/o gastritis and abd hernia, pt states she is also nausea, denies  vomiting, dec PO, pt a&ox3, denies any fever chills, pt with a H/O breast  CA to the right breast

## 2020-07-10 VITALS
SYSTOLIC BLOOD PRESSURE: 145 MMHG | DIASTOLIC BLOOD PRESSURE: 77 MMHG | RESPIRATION RATE: 16 BRPM | OXYGEN SATURATION: 100 % | HEART RATE: 72 BPM | TEMPERATURE: 98 F

## 2020-07-10 PROCEDURE — 71275 CT ANGIOGRAPHY CHEST: CPT | Mod: 26

## 2020-07-10 PROCEDURE — 76700 US EXAM ABDOM COMPLETE: CPT | Mod: 26

## 2020-07-10 PROCEDURE — 83690 ASSAY OF LIPASE: CPT

## 2020-07-10 PROCEDURE — 93005 ELECTROCARDIOGRAM TRACING: CPT

## 2020-07-10 PROCEDURE — 81001 URINALYSIS AUTO W/SCOPE: CPT

## 2020-07-10 PROCEDURE — 99284 EMERGENCY DEPT VISIT MOD MDM: CPT | Mod: 25

## 2020-07-10 PROCEDURE — 74177 CT ABD & PELVIS W/CONTRAST: CPT

## 2020-07-10 PROCEDURE — 71275 CT ANGIOGRAPHY CHEST: CPT

## 2020-07-10 PROCEDURE — 76700 US EXAM ABDOM COMPLETE: CPT

## 2020-07-10 PROCEDURE — 96361 HYDRATE IV INFUSION ADD-ON: CPT | Mod: XU

## 2020-07-10 PROCEDURE — 80053 COMPREHEN METABOLIC PANEL: CPT

## 2020-07-10 PROCEDURE — 87086 URINE CULTURE/COLONY COUNT: CPT

## 2020-07-10 PROCEDURE — 84484 ASSAY OF TROPONIN QUANT: CPT

## 2020-07-10 PROCEDURE — 96374 THER/PROPH/DIAG INJ IV PUSH: CPT | Mod: XU

## 2020-07-10 PROCEDURE — 74177 CT ABD & PELVIS W/CONTRAST: CPT | Mod: 26

## 2020-07-10 PROCEDURE — 85027 COMPLETE CBC AUTOMATED: CPT

## 2020-07-10 RX ORDER — FAMOTIDINE 10 MG/ML
1 INJECTION INTRAVENOUS
Qty: 14 | Refills: 0
Start: 2020-07-10 | End: 2020-07-23

## 2020-07-10 RX ADMIN — SODIUM CHLORIDE 1000 MILLILITER(S): 9 INJECTION INTRAMUSCULAR; INTRAVENOUS; SUBCUTANEOUS at 03:14

## 2020-07-10 NOTE — ED PROVIDER NOTE - NSFOLLOWUPINSTRUCTIONS_ED_ALL_ED_FT
1) Please follow up with your Primary Care Provider in 24-48 hours  2) Seek immediate medical care for any new or returning symptoms including but not limited severe pain, high fevers, difficulty keeping food and/or liquid down  3) Take Pepcid once day for 14 days

## 2020-07-10 NOTE — ED PROVIDER NOTE - PROGRESS NOTE DETAILS
Patient reassessed, NAD, non-toxic appearing. results dw pt, questions answered. tolerating po. sx improved. states has outpatient fu scheduled including gi. dc w/ strict return precautions. pt DENIES urinary sx, will send isabel.   Sonali Schmidt D.O. PGY3

## 2020-07-10 NOTE — ED ADULT NURSE REASSESSMENT NOTE - NS ED NURSE REASSESS COMMENT FT1
Pt taken to US. Vital signs to be repeated upon return. Pt reports feeling better prior to transport.

## 2020-07-10 NOTE — ED PROVIDER NOTE - OBJECTIVE STATEMENT
#[id number]    61 yo f pmh HTN HLD breast ca, gerd, presents with a cc of episodic intermittent upper abdominal pain with radiation to sternum, intermittent L chest pain a/w tingling to LUE, non exertional, thinks is worse in setting of eating. has had 2 week of sx, was advised by pcp to visit ed. denies n/v, diarrhea, having regular BMs, and passing gas.

## 2020-07-10 NOTE — ED PROVIDER NOTE - ATTENDING CONTRIBUTION TO CARE
I have personally performed a face to face medical and diagnostic evaluation of the patient. I have discussed with and reviewed the Resident's note and agree with the History, ROS, Physical Exam and MDM unless otherwise indicated. A brief summary of my personal evaluation and impression can be found below.    60F Croatian speaking, HTN HLD breast ca, presents with a cc of episodic intermittent upper abdominal pain with radiation to sternum, intermittent L chest pain a/w tingling to LUE, non exertional, thinks is worse in setting of eating. Also notes mild dysuria and suprapubic abdominal ttp. Also notes pleuritic chest pain, worse with deep inspiration. Denies /v/f/c/cp/sob. Denies headache, syncope, lightheadedness, dizziness. Denies chest palpitations, Denies edema. Denies hematuria, BRBPR, tarry stools, diarrhea, constipation.     All ROS otherwise negative as per HPI.    GEN APPEARANCE: WDWN, alert, non-toxic, NAD  HEAD: Atraumatic.  EYES: PERRLa, EOMI, vision grossly intact.   NECK: Supple  CV: RRR, S1S2, no c/r/m/g. Cap refill <2 seconds. No bruits.   LUNGS: CTAB. No abnormal breath sounds.  ABDOMEN: epigastric/RUQ/suprapubic ttp, +guarding, soft.   MSK/EXT: No spinal or extremity point tenderness. No CVA ttp. Pelvis stable. No peripheral edema.  NEURO: Alert, follows commands. Weight bearing normal. Speech normal. Sensation and motor normal x4 extremities.   SKIN: Warm, dry and intact. No rash.  PSYCH: Appropriate    Plan/MDM: 60F HTN HLD breast ca presents with RUQ/epigastric chest pain and pleuritic chest pain w mild tenderness on exam. ddx panc v marek v coltitis? v uti less likely acs/pe perc score low however will check labs ekg cardiacs, cta chest, ctap, ruq us reassess. I have personally performed a face to face medical and diagnostic evaluation of the patient. I have discussed with and reviewed the Resident's note and agree with the History, ROS, Physical Exam and MDM unless otherwise indicated. A brief summary of my personal evaluation and impression can be found below.    60F Gambian speaking, HTN HLD breast ca, presents with a cc of episodic intermittent upper abdominal pain with radiation to sternum, intermittent L chest pain a/w tingling to LUE, non exertional, thinks is worse in setting of eating. Also notes mild dysuria and suprapubic abdominal ttp. Feels as if radiates all the way to upper esophagus. Also notes pleuritic chest pain, worse with deep inspiration. Denies /v/f/c/cp/sob. Denies headache, syncope, lightheadedness, dizziness. Denies chest palpitations, Denies edema. Denies hematuria, BRBPR, tarry stools, diarrhea, constipation.     All ROS otherwise negative as per HPI.    GEN APPEARANCE: WDWN, alert, non-toxic, NAD  HEAD: Atraumatic.  EYES: PERRLa, EOMI, vision grossly intact.   NECK: Supple  CV: RRR, S1S2, no c/r/m/g. Cap refill <2 seconds. No bruits.   LUNGS: CTAB. No abnormal breath sounds.  ABDOMEN: epigastric/RUQ/suprapubic ttp, +guarding, soft.   MSK/EXT: No spinal or extremity point tenderness. No CVA ttp. Pelvis stable. No peripheral edema.  NEURO: Alert, follows commands. Weight bearing normal. Speech normal. Sensation and motor normal x4 extremities.   SKIN: Warm, dry and intact. No rash.  PSYCH: Appropriate    Plan/MDM: 60F HTN HLD breast ca presents with RUQ/epigastric chest pain and pleuritic chest pain w mild tenderness on exam. ddx panc v marek v coltitis? v uti less likely acs/pe given non exertional, worse in setting of eating, w radiation to esophagus /throat/upper neck, heart score low, perc score low however will check labs ekg cardiacs, cta chest, ctap, ruq us reassess.

## 2020-07-10 NOTE — ED PROVIDER NOTE - PATIENT PORTAL LINK FT
You can access the FollowMyHealth Patient Portal offered by  by registering at the following website: [email or web address]. By joining [facility]’s FollowMyHealth portal, you will also be able to view your health information using other applications (apps) compatible with our system.

## 2020-07-10 NOTE — ED PROVIDER NOTE - CLINICAL SUMMARY MEDICAL DECISION MAKING FREE TEXT BOX
dimas pgy3: 59 yo f w/ epigastric pain radiating to flank x 2 weeks progressively worsening. pocus w/o evidence cholecystitis. labs, imaging, sx control.

## 2020-07-10 NOTE — ED ADULT NURSE REASSESSMENT NOTE - NS ED NURSE REASSESS COMMENT FT1
Pt asleep on stretcher in no distress. Safety and comfort measures provided, bed locked and in lowest position, side rails up for safety. Call bell within reach. Awaiting all results

## 2020-07-10 NOTE — ED PROVIDER NOTE - PHYSICAL EXAMINATION
General: well appearing, interactive, well nourished, no apparent distress, ncat  HEENT: EOMI, PERRLA, normal mucosa, normal oropharynx, no lesions on the lips or on oral mucosa, normal external ear  Neck: supple, no lymphadenopathy, full range of motion, no nuchal rigidity  CV: RRR, normal S1 and S2 with no murmur, capillary refill less than two seconds  Resp: lungs CTA b/l, good aeration bilaterally, symmetric chest wall   Abd: non-distended, soft, mild ttp in epigastric/ruq, no guarding/rebound   : no CVA tenderness  MSK: full range of motion, no cyanosis, no edema, no clubbing, no immobility  Neuro: CN II-XII grossly intact, muscle strength 5/5 in all extremities, normal gait  Skin: no rashes, skin intact

## 2020-07-11 LAB
CULTURE RESULTS: SIGNIFICANT CHANGE UP
SPECIMEN SOURCE: SIGNIFICANT CHANGE UP

## 2020-08-13 ENCOUNTER — RESULT REVIEW (OUTPATIENT)
Age: 60
End: 2020-08-13

## 2020-08-13 ENCOUNTER — OUTPATIENT (OUTPATIENT)
Dept: OUTPATIENT SERVICES | Facility: HOSPITAL | Age: 60
LOS: 1 days | End: 2020-08-13
Payer: COMMERCIAL

## 2020-08-13 ENCOUNTER — APPOINTMENT (OUTPATIENT)
Dept: MAMMOGRAPHY | Facility: CLINIC | Age: 60
End: 2020-08-13
Payer: COMMERCIAL

## 2020-08-13 ENCOUNTER — APPOINTMENT (OUTPATIENT)
Dept: ULTRASOUND IMAGING | Facility: CLINIC | Age: 60
End: 2020-08-13
Payer: COMMERCIAL

## 2020-08-13 DIAGNOSIS — C50.919 MALIGNANT NEOPLASM OF UNSPECIFIED SITE OF UNSPECIFIED FEMALE BREAST: ICD-10-CM

## 2020-08-13 PROCEDURE — 77067 SCR MAMMO BI INCL CAD: CPT

## 2020-08-13 PROCEDURE — 76641 ULTRASOUND BREAST COMPLETE: CPT | Mod: 26,50

## 2020-08-13 PROCEDURE — 77067 SCR MAMMO BI INCL CAD: CPT | Mod: 26

## 2020-08-13 PROCEDURE — 76641 ULTRASOUND BREAST COMPLETE: CPT

## 2020-08-13 PROCEDURE — 77063 BREAST TOMOSYNTHESIS BI: CPT | Mod: 26

## 2020-08-13 PROCEDURE — 77063 BREAST TOMOSYNTHESIS BI: CPT

## 2020-09-10 ENCOUNTER — OUTPATIENT (OUTPATIENT)
Dept: OUTPATIENT SERVICES | Facility: HOSPITAL | Age: 60
LOS: 1 days | Discharge: HOME | End: 2020-09-10

## 2020-09-10 DIAGNOSIS — C50.919 MALIGNANT NEOPLASM OF UNSPECIFIED SITE OF UNSPECIFIED FEMALE BREAST: ICD-10-CM

## 2020-09-14 ENCOUNTER — RESULT REVIEW (OUTPATIENT)
Age: 60
End: 2020-09-14

## 2020-09-14 ENCOUNTER — APPOINTMENT (OUTPATIENT)
Dept: HEMATOLOGY ONCOLOGY | Facility: CLINIC | Age: 60
End: 2020-09-14
Payer: COMMERCIAL

## 2020-09-14 VITALS
TEMPERATURE: 98.4 F | OXYGEN SATURATION: 99 % | RESPIRATION RATE: 17 BRPM | BODY MASS INDEX: 26.09 KG/M2 | HEIGHT: 61.42 IN | WEIGHT: 139.99 LBS | DIASTOLIC BLOOD PRESSURE: 77 MMHG | HEART RATE: 76 BPM | SYSTOLIC BLOOD PRESSURE: 127 MMHG

## 2020-09-14 LAB
BASOPHILS # BLD AUTO: 0.02 K/UL — SIGNIFICANT CHANGE UP (ref 0–0.2)
BASOPHILS NFR BLD AUTO: 0.4 % — SIGNIFICANT CHANGE UP (ref 0–2)
EOSINOPHIL # BLD AUTO: 0.04 K/UL — SIGNIFICANT CHANGE UP (ref 0–0.5)
EOSINOPHIL NFR BLD AUTO: 0.8 % — SIGNIFICANT CHANGE UP (ref 0–6)
HCT VFR BLD CALC: 36.8 % — SIGNIFICANT CHANGE UP (ref 34.5–45)
HGB BLD-MCNC: 12.3 G/DL — SIGNIFICANT CHANGE UP (ref 11.5–15.5)
IMM GRANULOCYTES NFR BLD AUTO: 0.6 % — SIGNIFICANT CHANGE UP (ref 0–1.5)
LYMPHOCYTES # BLD AUTO: 1.34 K/UL — SIGNIFICANT CHANGE UP (ref 1–3.3)
LYMPHOCYTES # BLD AUTO: 26.7 % — SIGNIFICANT CHANGE UP (ref 13–44)
MCHC RBC-ENTMCNC: 29.6 PG — SIGNIFICANT CHANGE UP (ref 27–34)
MCHC RBC-ENTMCNC: 33.4 G/DL — SIGNIFICANT CHANGE UP (ref 32–36)
MCV RBC AUTO: 88.5 FL — SIGNIFICANT CHANGE UP (ref 80–100)
MONOCYTES # BLD AUTO: 0.48 K/UL — SIGNIFICANT CHANGE UP (ref 0–0.9)
MONOCYTES NFR BLD AUTO: 9.6 % — SIGNIFICANT CHANGE UP (ref 2–14)
NEUTROPHILS # BLD AUTO: 3.1 K/UL — SIGNIFICANT CHANGE UP (ref 1.8–7.4)
NEUTROPHILS NFR BLD AUTO: 61.9 % — SIGNIFICANT CHANGE UP (ref 43–77)
NRBC # BLD: 0 /100 WBCS — SIGNIFICANT CHANGE UP (ref 0–0)
PLATELET # BLD AUTO: 269 K/UL — SIGNIFICANT CHANGE UP (ref 150–400)
RBC # BLD: 4.16 M/UL — SIGNIFICANT CHANGE UP (ref 3.8–5.2)
RBC # FLD: 13.2 % — SIGNIFICANT CHANGE UP (ref 10.3–14.5)
WBC # BLD: 5.01 K/UL — SIGNIFICANT CHANGE UP (ref 3.8–10.5)
WBC # FLD AUTO: 5.01 K/UL — SIGNIFICANT CHANGE UP (ref 3.8–10.5)

## 2020-09-14 PROCEDURE — 99213 OFFICE O/P EST LOW 20 MIN: CPT

## 2020-09-15 NOTE — PHYSICAL EXAM
[Fully active, able to carry on all pre-disease performance without restriction] : Status 0 - Fully active, able to carry on all pre-disease performance without restriction [Normal] : grossly intact [de-identified] : fibrous thickening of skin on the apex of axillary region of right breast- dense, No mass, no nipple discharge.

## 2020-09-15 NOTE — HISTORY OF PRESENT ILLNESS
[de-identified] : Leukopenia- resolved.  The patient was diagnosed with triple negative breast cancer about 7 years ago.  She underwent a lumpectomy with a sentinel lymph node biopsy, lesion was 2.2 cm- T2N0M0- invasive ductal carcinoma.  ER/ID negative, Her2 negative.  She was treated with 4 cycles of adjuvant TC along with neulasta support.  Her treatment with complicated by bone pains due to the neulasta.  She then received IMRT with 5000cGy in 25 fractions followed by boost to the cavity of 1000cGy in 5 fractions- this was done from 11/9/11-12/21/11.  She has had occasional leukopenia, she was being followed by Dr. Julianne Wiggins.  She has had a history of bilateral neck masses, she underwent a left neck excision 6/7/2012 which revealed a lipomatous subcutaneous tissue.  She has noticed the right neck lesion has continued to grow over the last few years.  She complains of a right neck mass which has been present and increasing in size for the past two years. She underwent a biopsy with unremarkable results. She had a lipoma on the left side of her neck which was surgically removed. [de-identified] : Patient presents today for a follow up visit. Her daughter is present via phone.  She feels better, her abdominal discomfort is better.  She had a scan/EGD, found to have ?adrenal lesion that she is planning to have biopsied by endocrinology but they are unsure of, no reports present.  She has been having sodium issues.  Denies fever/chills, visual changes, SOB or CLARK, chest pain, abdominal pain, n/v/d.  Her appetite is better, she has gained weight. \par

## 2020-09-15 NOTE — REVIEW OF SYSTEMS
[Abdominal Pain] : abdominal pain [Negative] : Allergic/Immunologic [Fever] : no fever [Chills] : no chills [Night Sweats] : no night sweats [Fatigue] : no fatigue [Recent Change In Weight] : ~T no recent weight change [Vomiting] : no vomiting [Constipation] : no constipation [Diarrhea] : no diarrhea [FreeTextEntry7] : better

## 2021-03-09 ENCOUNTER — TRANSCRIPTION ENCOUNTER (OUTPATIENT)
Age: 61
End: 2021-03-09

## 2021-03-18 ENCOUNTER — OUTPATIENT (OUTPATIENT)
Dept: OUTPATIENT SERVICES | Facility: HOSPITAL | Age: 61
LOS: 1 days | End: 2021-03-18
Payer: COMMERCIAL

## 2021-03-18 ENCOUNTER — APPOINTMENT (OUTPATIENT)
Dept: MAMMOGRAPHY | Facility: IMAGING CENTER | Age: 61
End: 2021-03-18
Payer: COMMERCIAL

## 2021-03-18 ENCOUNTER — RESULT REVIEW (OUTPATIENT)
Age: 61
End: 2021-03-18

## 2021-03-18 DIAGNOSIS — Z00.00 ENCOUNTER FOR GENERAL ADULT MEDICAL EXAMINATION WITHOUT ABNORMAL FINDINGS: ICD-10-CM

## 2021-03-18 PROCEDURE — 77065 DX MAMMO INCL CAD UNI: CPT | Mod: 26,LT

## 2021-03-18 PROCEDURE — 77065 DX MAMMO INCL CAD UNI: CPT

## 2021-04-08 ENCOUNTER — TRANSCRIPTION ENCOUNTER (OUTPATIENT)
Age: 61
End: 2021-04-08

## 2021-04-26 NOTE — ED PROVIDER NOTE - RESPIRATORY [+], MLM
Bed: ED03  Expected date:   Expected time:   Means of arrival:   Comments:  Alexandra Tavarez   SHORTNESS OF BREATH

## 2021-08-16 ENCOUNTER — APPOINTMENT (OUTPATIENT)
Dept: MAMMOGRAPHY | Facility: IMAGING CENTER | Age: 61
End: 2021-08-16

## 2021-08-16 ENCOUNTER — OUTPATIENT (OUTPATIENT)
Dept: OUTPATIENT SERVICES | Facility: HOSPITAL | Age: 61
LOS: 1 days | End: 2021-08-16

## 2021-08-16 ENCOUNTER — APPOINTMENT (OUTPATIENT)
Dept: ULTRASOUND IMAGING | Facility: IMAGING CENTER | Age: 61
End: 2021-08-16

## 2021-08-16 DIAGNOSIS — Z00.8 ENCOUNTER FOR OTHER GENERAL EXAMINATION: ICD-10-CM

## 2021-09-02 ENCOUNTER — TRANSCRIPTION ENCOUNTER (OUTPATIENT)
Age: 61
End: 2021-09-02

## 2021-10-02 ENCOUNTER — TRANSCRIPTION ENCOUNTER (OUTPATIENT)
Age: 61
End: 2021-10-02

## 2021-11-05 NOTE — ED ADULT NURSE NOTE - CAS EDN DISCHARGE ASSESSMENT
Alert and oriented to person, place and time Ketoconazole Pregnancy And Lactation Text: This medication is Pregnancy Category C and it isn't know if it is safe during pregnancy. It is also excreted in breast milk and breast feeding isn't recommended.

## 2022-01-17 ENCOUNTER — TRANSCRIPTION ENCOUNTER (OUTPATIENT)
Age: 62
End: 2022-01-17

## 2022-01-21 ENCOUNTER — TRANSCRIPTION ENCOUNTER (OUTPATIENT)
Age: 62
End: 2022-01-21

## 2022-01-31 ENCOUNTER — OUTPATIENT (OUTPATIENT)
Dept: OUTPATIENT SERVICES | Facility: HOSPITAL | Age: 62
LOS: 1 days | End: 2022-01-31
Payer: COMMERCIAL

## 2022-01-31 ENCOUNTER — RESULT REVIEW (OUTPATIENT)
Age: 62
End: 2022-01-31

## 2022-01-31 ENCOUNTER — APPOINTMENT (OUTPATIENT)
Dept: ULTRASOUND IMAGING | Facility: IMAGING CENTER | Age: 62
End: 2022-01-31
Payer: MEDICAID

## 2022-01-31 DIAGNOSIS — Z00.8 ENCOUNTER FOR OTHER GENERAL EXAMINATION: ICD-10-CM

## 2022-01-31 PROCEDURE — 88377 M/PHMTRC ALYS ISHQUANT/SEMIQ: CPT

## 2022-01-31 PROCEDURE — 88360 TUMOR IMMUNOHISTOCHEM/MANUAL: CPT

## 2022-01-31 PROCEDURE — 88305 TISSUE EXAM BY PATHOLOGIST: CPT | Mod: 26

## 2022-01-31 PROCEDURE — 77065 DX MAMMO INCL CAD UNI: CPT

## 2022-01-31 PROCEDURE — 88305 TISSUE EXAM BY PATHOLOGIST: CPT

## 2022-01-31 PROCEDURE — 19083 BX BREAST 1ST LESION US IMAG: CPT

## 2022-01-31 PROCEDURE — 88360 TUMOR IMMUNOHISTOCHEM/MANUAL: CPT | Mod: 26

## 2022-01-31 PROCEDURE — 77065 DX MAMMO INCL CAD UNI: CPT | Mod: 26,LT

## 2022-01-31 PROCEDURE — 19083 BX BREAST 1ST LESION US IMAG: CPT | Mod: LT

## 2022-01-31 PROCEDURE — 88377 M/PHMTRC ALYS ISHQUANT/SEMIQ: CPT | Mod: 26

## 2022-02-02 LAB — SURGICAL PATHOLOGY STUDY: SIGNIFICANT CHANGE UP

## 2022-02-07 ENCOUNTER — NON-APPOINTMENT (OUTPATIENT)
Age: 62
End: 2022-02-07

## 2022-02-08 ENCOUNTER — NON-APPOINTMENT (OUTPATIENT)
Age: 62
End: 2022-02-08

## 2022-02-09 ENCOUNTER — OUTPATIENT (OUTPATIENT)
Dept: OUTPATIENT SERVICES | Facility: HOSPITAL | Age: 62
LOS: 1 days | Discharge: HOME | End: 2022-02-09
Payer: COMMERCIAL

## 2022-02-09 DIAGNOSIS — C50.919 MALIGNANT NEOPLASM OF UNSPECIFIED SITE OF UNSPECIFIED FEMALE BREAST: ICD-10-CM

## 2022-02-10 ENCOUNTER — RESULT REVIEW (OUTPATIENT)
Age: 62
End: 2022-02-10

## 2022-02-11 ENCOUNTER — RESULT REVIEW (OUTPATIENT)
Age: 62
End: 2022-02-11

## 2022-02-11 ENCOUNTER — LABORATORY RESULT (OUTPATIENT)
Age: 62
End: 2022-02-11

## 2022-02-11 ENCOUNTER — APPOINTMENT (OUTPATIENT)
Dept: HEMATOLOGY ONCOLOGY | Facility: CLINIC | Age: 62
End: 2022-02-11
Payer: COMMERCIAL

## 2022-02-11 VITALS
SYSTOLIC BLOOD PRESSURE: 132 MMHG | OXYGEN SATURATION: 97 % | DIASTOLIC BLOOD PRESSURE: 77 MMHG | RESPIRATION RATE: 17 BRPM | BODY MASS INDEX: 27.37 KG/M2 | HEIGHT: 61.42 IN | WEIGHT: 146.83 LBS | TEMPERATURE: 97.5 F | HEART RATE: 69 BPM

## 2022-02-11 DIAGNOSIS — Z86.69 PERSONAL HISTORY OF OTHER DISEASES OF THE NERVOUS SYSTEM AND SENSE ORGANS: ICD-10-CM

## 2022-02-11 DIAGNOSIS — Z00.00 ENCOUNTER FOR GENERAL ADULT MEDICAL EXAMINATION W/OUT ABNORMAL FINDINGS: ICD-10-CM

## 2022-02-11 LAB
BASOPHILS # BLD AUTO: 0.03 K/UL — SIGNIFICANT CHANGE UP (ref 0–0.2)
BASOPHILS NFR BLD AUTO: 0.6 % — SIGNIFICANT CHANGE UP (ref 0–2)
EOSINOPHIL # BLD AUTO: 0.13 K/UL — SIGNIFICANT CHANGE UP (ref 0–0.5)
EOSINOPHIL NFR BLD AUTO: 2.6 % — SIGNIFICANT CHANGE UP (ref 0–6)
HCT VFR BLD CALC: 37.9 % — SIGNIFICANT CHANGE UP (ref 34.5–45)
HGB BLD-MCNC: 12.2 G/DL — SIGNIFICANT CHANGE UP (ref 11.5–15.5)
IMM GRANULOCYTES NFR BLD AUTO: 0.2 % — SIGNIFICANT CHANGE UP (ref 0–1.5)
LYMPHOCYTES # BLD AUTO: 1.48 K/UL — SIGNIFICANT CHANGE UP (ref 1–3.3)
LYMPHOCYTES # BLD AUTO: 29.4 % — SIGNIFICANT CHANGE UP (ref 13–44)
MCHC RBC-ENTMCNC: 29 PG — SIGNIFICANT CHANGE UP (ref 27–34)
MCHC RBC-ENTMCNC: 32.2 G/DL — SIGNIFICANT CHANGE UP (ref 32–36)
MCV RBC AUTO: 90 FL — SIGNIFICANT CHANGE UP (ref 80–100)
MONOCYTES # BLD AUTO: 0.48 K/UL — SIGNIFICANT CHANGE UP (ref 0–0.9)
MONOCYTES NFR BLD AUTO: 9.5 % — SIGNIFICANT CHANGE UP (ref 2–14)
NEUTROPHILS # BLD AUTO: 2.9 K/UL — SIGNIFICANT CHANGE UP (ref 1.8–7.4)
NEUTROPHILS NFR BLD AUTO: 57.7 % — SIGNIFICANT CHANGE UP (ref 43–77)
NRBC # BLD: 0 /100 WBCS — SIGNIFICANT CHANGE UP (ref 0–0)
PLATELET # BLD AUTO: 280 K/UL — SIGNIFICANT CHANGE UP (ref 150–400)
RBC # BLD: 4.21 M/UL — SIGNIFICANT CHANGE UP (ref 3.8–5.2)
RBC # FLD: 13.2 % — SIGNIFICANT CHANGE UP (ref 10.3–14.5)
WBC # BLD: 5.03 K/UL — SIGNIFICANT CHANGE UP (ref 3.8–10.5)
WBC # FLD AUTO: 5.03 K/UL — SIGNIFICANT CHANGE UP (ref 3.8–10.5)

## 2022-02-11 PROCEDURE — 99205 OFFICE O/P NEW HI 60 MIN: CPT

## 2022-02-11 PROCEDURE — 93010 ELECTROCARDIOGRAM REPORT: CPT

## 2022-02-14 ENCOUNTER — APPOINTMENT (OUTPATIENT)
Dept: MRI IMAGING | Facility: IMAGING CENTER | Age: 62
End: 2022-02-14
Payer: MEDICAID

## 2022-02-14 ENCOUNTER — OUTPATIENT (OUTPATIENT)
Dept: OUTPATIENT SERVICES | Facility: HOSPITAL | Age: 62
LOS: 1 days | End: 2022-02-14
Payer: MEDICAID

## 2022-02-14 DIAGNOSIS — C50.919 MALIGNANT NEOPLASM OF UNSPECIFIED SITE OF UNSPECIFIED FEMALE BREAST: ICD-10-CM

## 2022-02-14 LAB
ALBUMIN SERPL ELPH-MCNC: 4.8 G/DL
ALP BLD-CCNC: 101 U/L
ALT SERPL-CCNC: 20 U/L
ANION GAP SERPL CALC-SCNC: 12 MMOL/L
AST SERPL-CCNC: 21 U/L
BILIRUB SERPL-MCNC: <0.2 MG/DL
BUN SERPL-MCNC: 14 MG/DL
CALCIUM SERPL-MCNC: 9.5 MG/DL
CHLORIDE SERPL-SCNC: 100 MMOL/L
CO2 SERPL-SCNC: 26 MMOL/L
CREAT SERPL-MCNC: 0.59 MG/DL
GLUCOSE SERPL-MCNC: 102 MG/DL
POTASSIUM SERPL-SCNC: 5.1 MMOL/L
PROT SERPL-MCNC: 7.4 G/DL
SODIUM SERPL-SCNC: 138 MMOL/L

## 2022-02-14 PROCEDURE — C8908: CPT

## 2022-02-14 PROCEDURE — 77049 MRI BREAST C-+ W/CAD BI: CPT | Mod: 26

## 2022-02-14 PROCEDURE — A9585: CPT

## 2022-02-14 PROCEDURE — C8937: CPT

## 2022-02-15 ENCOUNTER — APPOINTMENT (OUTPATIENT)
Dept: NUCLEAR MEDICINE | Facility: IMAGING CENTER | Age: 62
End: 2022-02-15
Payer: MEDICAID

## 2022-02-15 ENCOUNTER — APPOINTMENT (OUTPATIENT)
Dept: CT IMAGING | Facility: IMAGING CENTER | Age: 62
End: 2022-02-15

## 2022-02-15 ENCOUNTER — OUTPATIENT (OUTPATIENT)
Dept: OUTPATIENT SERVICES | Facility: HOSPITAL | Age: 62
LOS: 1 days | End: 2022-02-15
Payer: COMMERCIAL

## 2022-02-15 ENCOUNTER — RESULT REVIEW (OUTPATIENT)
Age: 62
End: 2022-02-15

## 2022-02-15 DIAGNOSIS — C50.919 MALIGNANT NEOPLASM OF UNSPECIFIED SITE OF UNSPECIFIED FEMALE BREAST: ICD-10-CM

## 2022-02-15 PROCEDURE — 78306 BONE IMAGING WHOLE BODY: CPT | Mod: 26

## 2022-02-15 PROCEDURE — 78830 RP LOCLZJ TUM SPECT W/CT 1: CPT

## 2022-02-15 PROCEDURE — 78830 RP LOCLZJ TUM SPECT W/CT 1: CPT | Mod: 26

## 2022-02-15 PROCEDURE — 71260 CT THORAX DX C+: CPT

## 2022-02-15 PROCEDURE — A9561: CPT

## 2022-02-15 PROCEDURE — 74177 CT ABD & PELVIS W/CONTRAST: CPT | Mod: 26

## 2022-02-15 PROCEDURE — 78306 BONE IMAGING WHOLE BODY: CPT

## 2022-02-15 PROCEDURE — 74177 CT ABD & PELVIS W/CONTRAST: CPT

## 2022-02-15 PROCEDURE — 82565 ASSAY OF CREATININE: CPT

## 2022-02-15 PROCEDURE — 71260 CT THORAX DX C+: CPT | Mod: 26

## 2022-02-17 ENCOUNTER — APPOINTMENT (OUTPATIENT)
Dept: MULTI SPECIALTY CLINIC | Facility: CLINIC | Age: 62
End: 2022-02-17
Payer: MEDICAID

## 2022-02-17 VITALS
DIASTOLIC BLOOD PRESSURE: 84 MMHG | RESPIRATION RATE: 18 BRPM | SYSTOLIC BLOOD PRESSURE: 157 MMHG | HEART RATE: 65 BPM | OXYGEN SATURATION: 99 %

## 2022-02-17 PROCEDURE — 99242 OFF/OP CONSLTJ NEW/EST SF 20: CPT | Mod: GC,25

## 2022-02-17 PROCEDURE — 99245 OFF/OP CONSLTJ NEW/EST HI 55: CPT

## 2022-02-17 NOTE — HISTORY OF PRESENT ILLNESS
[de-identified] : ARI LOYA  is a 62 year old female here for initial consultation for newly diagnosed left breast cancer.\par \par In , pt was diagnosed with T2N0M0 (2.2cm), stage IIA RIGHT breast triple negative invasive ductal carcinoma with Taylor score 8 to 9.  She underwent lumpectomy Dr. Keyon Amezquita and then received adjuvant chemotherapy with taxotere and cytoxan for 4 cycles.  She then received IMRT with 5000cGy in 25 fractions followed by boost to the cavity of 1000cGy in 5 fractions 11-11 with Dr. Veena Walls. \par \par Pt had benign right breast stereotactic biopsy for calcifications in 2012 and s/p benign left breast US guided core biopsy 2018 (4-5:00).\par \par In 2020, screening mammogram/US showed calcifications in left mid breast as well as scattered and occasionally loosely grouped calcifications of central breast, 6mos follow up recommended. \par \par On 3/18/2021, follow up left breast mammogram showed probably benign calcifications in anterior left breast without significant change since 2020. FU in 2021 for stability.  \par \par In 21, bilateral mammo/US showed left breast 2-3:00 4cmfn 1.4cm irregular mass suspicious of malignancy, US core biopsy recommended; grouped heterogenous calcifications at right breast 10:00 anterior depth also suspicious for malignancy, bx recommended, BIRADS4. \par \par Pt underwent left breast 3:00 4cmfn US guided biopsy on 22 which showed invasive poorly differentiated ductal carcinoma, taylor score 8/9 (3+3+2), invasive tumor at least 0.8cm, microcalcifications present, no LVI.  ER-/MS-, HER2 equivocal pending  CISH. \par \par MRI breast 22: \par 1. left breast 3:00 axis biopsy proven cancer with associated 1.4 cm mass enhancement. \par 2. Additional adjacent contiguous large area of linear non mass enhancement seen extending from the biopsy site into retroareolar/central/posterior left breast, up to 9.4 cm in AP dimension. 2 site MRI guided core needle biopsy of the most anterior and posterior portions is recommended if this would alter surgical management. \par 3. No suspicious enhancement in the right breast \par 4. No lymphadenopathy \par MRI guided biopsy BIRADS 4 \par \par CT C/A/P 2/15/22: Multiple mildly enlarged mediastinal and hilar nodes, increased in size compared to 7/10/2020. Underdistended urinary bladder with questionable wall thickening. \par \par Bone scan 2/15/22:  No scan evidence of osseous metastasis. Dengeneratvive disease in the spine and major joints \par \par \par h/o arthritis on duloxetine. \par \par Risk factors: Prior breast disease: as above.  Menarche: 14. Postmenopausal,  after hysterectomy.  4 pregnancies, 5 miscarriages, 4 living children. \par Age of first pregnancy 18.   Breast feedin year each child. Oral contraceptive use: yes, 5 years. Other hormone exposure: None. \par Sister leukemia dx'd age 40. Mother had multiple myeloma.  Brother stomach cancer age 68.  Daughter with CLL age 30.  Paternal 2nd cousin breast cancer age 49. Paternal 2nd cousin liver cancer age 40 had hepatitis. Family history is negative for cancer of the ovary, endometrium, prostate, pancreatic and melanoma. \par The patient is not of Ashkenazi ethnic background. Never had genetic testing.  \par \par HEALTH MAINTENANCE \par PCP Dr. Maty Nunez\par GI Dr. Guo, colonoscopy 5 years, no polyps, due now\par GYN no pap smears, s/p MARK, no vaginal bleeding or spotting\par DERM Dr. Richards and Dr. Ramsey at [facility]; no hx of skin cancer\par s/p COVID vaccine Pfizer

## 2022-02-17 NOTE — PHYSICAL EXAM
[Normal] : supple, no neck mass and thyroid not enlarged [Normal Supraclavicular Lymph Nodes] : normal supraclavicular lymph nodes [Normal Axillary Lymph Nodes] : normal axillary lymph nodes [Normal] : oriented to person, place and time, with appropriate affect [de-identified] : Radiation changes right breast but no local recurrence; left breast with fibrocystic changes only

## 2022-02-17 NOTE — ASSESSMENT
[FreeTextEntry1] : IMP: 62 year old female present with a new left breast cancer.\par MRI show an extensive area of enhancement area the biopsy site\par CT ( [facility]) shows mediastinal adenopathy\par \par PLAN: \par MRI biopsy of area of enhancement\par PET- CT\par BRCA testing pending\par Patient considering lumpectomy and SLNB with RT vs. MRM\par Adjuvant chemotherapy and Herceptin\par \par

## 2022-02-28 NOTE — PHYSICAL EXAM
[Sclera] : the sclera and conjunctiva were normal [Outer Ear] : the ears and nose were normal in appearance [] : no respiratory distress [Heart Rate And Rhythm] : heart rate and rhythm were normal [Symmetric] : breasts are symmetric [Breast Abnormal Lactation (Galactorrhea)] : no nipple discharge [Nondistended] : nondistended [Normal] : no palpable adenopathy [Supraclavicular Lymph Nodes Enlarged Bilaterally] : supraclavicular [Musculoskeletal - Swelling] : no joint swelling [Skin Color & Pigmentation] : normal skin color and pigmentation [No Focal Deficits] : no focal deficits [Oriented To Time, Place, And Person] : oriented to person, place, and time [No UE Edema] : there is no upper extremity edema [Abdomen Soft] : soft [Axillary Lymph Nodes Enlarged Bilaterally] : axillary [de-identified] : Right lumpectomy and axillary scars are well healed, no erythema, no hyperpigmentation; left breast without suspicious findings

## 2022-02-28 NOTE — HISTORY OF PRESENT ILLNESS
[FreeTextEntry1] : Ms. Steiner is a 62 year old female with history of TN right breast cancer in 2011 s/p lumpectomy and adjuvant chemotherapy and RT to 6000cGy with Dr. Walls. Patient now with newly diagnosed high grade left breast IDC, ER-/MO-/ Her2 positive, cT1bN0, StageIA, here for initial consultation of new diagnosed breast cancer.\par \par Brief Oncological History:\par In 2011, patient was diagnosed with T2N0M0 (2.2cm), stage IIA RIGHT breast triple negative invasive ductal carcinoma with [place]th[place] score 8 to 9. She underwent lumpectomy Dr. Keyon Amezquita and then received adjuvant chemotherapy with taxotere and cytoxan for 4 cycles. She then received IMRT with 5000cGy in 25 fractions followed by boost to the cavity of 1000cGy in 5 fractions 11/9/11-12/21/11 with Dr. Veena Walls. \par \par Pt. had benign right breast stereotactic biopsy for calcifications in 7/26/2012 and s/p benign left breast US guided core biopsy 9/7/2018 (4-5:00). In 8/13/2020, screening mammogram/US showed calcifications in left mid breast as well as scattered and occasionally loosely grouped calcifications of central breast, 6mos follow up recommended. \par \par On 3/18/2021, follow up left breast mammogram showed probably benign calcifications in anterior left breast without significant change since 8/13/2020. FU in 8/2021 for stability. \par \par In 12/28/21, bilateral mammo/US showed left breast 2-3:00 4cmfn 1.4cm irregular mass suspicious of malignancy, US core biopsy recommended; grouped heterogenous calcifications at right breast 10:00 anterior depth also suspicious for malignancy, biopsy recommended, BIRADS4. \par \par Pt underwent left breast 3:00 4cmfn US guided biopsy on 1/31/22 which showed invasive poorly differentiated ductal carcinoma, Alexx Score 8/9 (3+3+2), invasive tumor at least 0.8cm, microcalcifications present, no LVI. ER negative <1%, MO negative <1%, HER2 2+ IHC, CISH positive.\par \par MRI Bilateral Breasts on 2/14/22 demonstrates left breast 3:00 axis proven cancer with associated 1.4 cm mass enhancement.  Additional adjacent contiguous large area of linear non mass enhancement seen extending from the biopsy site into retroareolar/central/posterior left breast, up to 9.4 cm in AP dimension. Additionally, 2 site MRI guided core biopsies of the most anterior and posterior portions is recommended. No suspicious enhancement in the right breast.  No lymphadenopathy.\par \par CT C/A/P on 2/15/22 demonstrates multiple mildly enlarged mediastinal and hilar nodes, increased in size compared to 7/10/2020. Bone scan 2/15/22 is without evidence of osseous metastasis.\par \par Ms. Murray is doing well today and has no complaints. She presents with her daughter who acts as a partial .

## 2022-03-01 ENCOUNTER — RESULT REVIEW (OUTPATIENT)
Age: 62
End: 2022-03-01

## 2022-03-01 ENCOUNTER — APPOINTMENT (OUTPATIENT)
Dept: MRI IMAGING | Facility: IMAGING CENTER | Age: 62
End: 2022-03-01
Payer: MEDICAID

## 2022-03-01 ENCOUNTER — OUTPATIENT (OUTPATIENT)
Dept: OUTPATIENT SERVICES | Facility: HOSPITAL | Age: 62
LOS: 1 days | End: 2022-03-01
Payer: MEDICAID

## 2022-03-01 DIAGNOSIS — C50.919 MALIGNANT NEOPLASM OF UNSPECIFIED SITE OF UNSPECIFIED FEMALE BREAST: ICD-10-CM

## 2022-03-01 PROCEDURE — A9585: CPT

## 2022-03-01 PROCEDURE — 88305 TISSUE EXAM BY PATHOLOGIST: CPT

## 2022-03-01 PROCEDURE — 88305 TISSUE EXAM BY PATHOLOGIST: CPT | Mod: 26

## 2022-03-01 PROCEDURE — 19086 BX BREAST ADD LESION MR IMAG: CPT

## 2022-03-01 PROCEDURE — 19085 BX BREAST 1ST LESION MR IMAG: CPT

## 2022-03-01 PROCEDURE — 19086 BX BREAST ADD LESION MR IMAG: CPT | Mod: LT

## 2022-03-01 PROCEDURE — 77065 DX MAMMO INCL CAD UNI: CPT

## 2022-03-01 PROCEDURE — 19085 BX BREAST 1ST LESION MR IMAG: CPT | Mod: LT

## 2022-03-01 PROCEDURE — 77065 DX MAMMO INCL CAD UNI: CPT | Mod: 26,LT

## 2022-03-02 ENCOUNTER — APPOINTMENT (OUTPATIENT)
Dept: NUCLEAR MEDICINE | Facility: IMAGING CENTER | Age: 62
End: 2022-03-02
Payer: MEDICAID

## 2022-03-02 ENCOUNTER — OUTPATIENT (OUTPATIENT)
Dept: OUTPATIENT SERVICES | Facility: HOSPITAL | Age: 62
LOS: 1 days | End: 2022-03-02
Payer: MEDICAID

## 2022-03-02 DIAGNOSIS — C50.919 MALIGNANT NEOPLASM OF UNSPECIFIED SITE OF UNSPECIFIED FEMALE BREAST: ICD-10-CM

## 2022-03-02 PROCEDURE — 78815 PET IMAGE W/CT SKULL-THIGH: CPT | Mod: 26,PI

## 2022-03-02 PROCEDURE — 78815 PET IMAGE W/CT SKULL-THIGH: CPT

## 2022-03-02 PROCEDURE — A9552: CPT

## 2022-03-08 ENCOUNTER — RESULT REVIEW (OUTPATIENT)
Age: 62
End: 2022-03-08

## 2022-03-13 LAB — SURGICAL PATHOLOGY STUDY: SIGNIFICANT CHANGE UP

## 2022-03-15 ENCOUNTER — APPOINTMENT (OUTPATIENT)
Dept: ULTRASOUND IMAGING | Facility: IMAGING CENTER | Age: 62
End: 2022-03-15
Payer: MEDICAID

## 2022-03-15 ENCOUNTER — OUTPATIENT (OUTPATIENT)
Dept: OUTPATIENT SERVICES | Facility: HOSPITAL | Age: 62
LOS: 1 days | End: 2022-03-15
Payer: MEDICAID

## 2022-03-15 ENCOUNTER — RESULT REVIEW (OUTPATIENT)
Age: 62
End: 2022-03-15

## 2022-03-15 DIAGNOSIS — C50.919 MALIGNANT NEOPLASM OF UNSPECIFIED SITE OF UNSPECIFIED FEMALE BREAST: ICD-10-CM

## 2022-03-15 DIAGNOSIS — Z00.8 ENCOUNTER FOR OTHER GENERAL EXAMINATION: ICD-10-CM

## 2022-03-15 PROCEDURE — 10005 FNA BX W/US GDN 1ST LES: CPT

## 2022-03-15 PROCEDURE — 88305 TISSUE EXAM BY PATHOLOGIST: CPT

## 2022-03-15 PROCEDURE — 88173 CYTOPATH EVAL FNA REPORT: CPT | Mod: 26

## 2022-03-15 PROCEDURE — 76942 ECHO GUIDE FOR BIOPSY: CPT

## 2022-03-15 PROCEDURE — 38505 NEEDLE BIOPSY LYMPH NODES: CPT

## 2022-03-15 PROCEDURE — 88305 TISSUE EXAM BY PATHOLOGIST: CPT | Mod: 26

## 2022-03-15 PROCEDURE — 88173 CYTOPATH EVAL FNA REPORT: CPT

## 2022-03-15 PROCEDURE — 88172 CYTP DX EVAL FNA 1ST EA SITE: CPT

## 2022-03-17 LAB — NON-GYNECOLOGICAL CYTOLOGY STUDY: SIGNIFICANT CHANGE UP

## 2022-03-21 LAB — SARS-COV-2 N GENE NPH QL NAA+PROBE: NOT DETECTED

## 2022-03-24 ENCOUNTER — APPOINTMENT (OUTPATIENT)
Dept: PULMONOLOGY | Facility: CLINIC | Age: 62
End: 2022-03-24
Payer: MEDICAID

## 2022-03-24 ENCOUNTER — APPOINTMENT (OUTPATIENT)
Dept: SURGICAL ONCOLOGY | Facility: CLINIC | Age: 62
End: 2022-03-24
Payer: MEDICAID

## 2022-03-24 VITALS
TEMPERATURE: 97.6 F | SYSTOLIC BLOOD PRESSURE: 144 MMHG | DIASTOLIC BLOOD PRESSURE: 71 MMHG | HEIGHT: 62 IN | HEART RATE: 67 BPM | WEIGHT: 144 LBS | RESPIRATION RATE: 16 BRPM | BODY MASS INDEX: 26.5 KG/M2 | OXYGEN SATURATION: 96 %

## 2022-03-24 VITALS
SYSTOLIC BLOOD PRESSURE: 140 MMHG | TEMPERATURE: 97.3 F | BODY MASS INDEX: 26.5 KG/M2 | HEIGHT: 62 IN | HEART RATE: 68 BPM | WEIGHT: 144 LBS | DIASTOLIC BLOOD PRESSURE: 86 MMHG | RESPIRATION RATE: 16 BRPM | OXYGEN SATURATION: 98 %

## 2022-03-24 DIAGNOSIS — Z80.8 FAMILY HISTORY OF MALIGNANT NEOPLASM OF OTHER ORGANS OR SYSTEMS: ICD-10-CM

## 2022-03-24 DIAGNOSIS — Z82.49 FAMILY HISTORY OF ISCHEMIC HEART DISEASE AND OTHER DISEASES OF THE CIRCULATORY SYSTEM: ICD-10-CM

## 2022-03-24 DIAGNOSIS — Z83.3 FAMILY HISTORY OF DIABETES MELLITUS: ICD-10-CM

## 2022-03-24 DIAGNOSIS — Z78.9 OTHER SPECIFIED HEALTH STATUS: ICD-10-CM

## 2022-03-24 DIAGNOSIS — Z87.39 PERSONAL HISTORY OF OTHER DISEASES OF THE MUSCULOSKELETAL SYSTEM AND CONNECTIVE TISSUE: ICD-10-CM

## 2022-03-24 PROCEDURE — 94010 BREATHING CAPACITY TEST: CPT

## 2022-03-24 PROCEDURE — 71046 X-RAY EXAM CHEST 2 VIEWS: CPT

## 2022-03-24 PROCEDURE — 94727 GAS DIL/WSHOT DETER LNG VOL: CPT

## 2022-03-24 PROCEDURE — ZZZZZ: CPT

## 2022-03-24 PROCEDURE — 99204 OFFICE O/P NEW MOD 45 MIN: CPT | Mod: 25

## 2022-03-24 PROCEDURE — 99214 OFFICE O/P EST MOD 30 MIN: CPT

## 2022-03-24 PROCEDURE — 94618 PULMONARY STRESS TESTING: CPT

## 2022-03-24 PROCEDURE — 94729 DIFFUSING CAPACITY: CPT

## 2022-03-24 PROCEDURE — 95012 NITRIC OXIDE EXP GAS DETER: CPT

## 2022-03-24 NOTE — ADDENDUM
[FreeTextEntry1] : Documented by Cat Bright acting as a scribe for Dr. Justice Mcdonald on 03/24/2022 \par \par All medical record entries made by the Scribe were at my, Dr. Justice Mcdonald's, direction and personally dictated by me on 03/24/2022 . I have reviewed the chart and agree that the record accurately reflects my personal performance of the history, physical exam, assessment and plan. I have also personally directed, reviewed, and agree with the discharge instructions

## 2022-03-24 NOTE — PHYSICAL EXAM
[Normal] : supple, no neck mass and thyroid not enlarged [Normal Supraclavicular Lymph Nodes] : normal supraclavicular lymph nodes [Normal Axillary Lymph Nodes] : normal axillary lymph nodes [Normal] : oriented to person, place and time, with appropriate affect [de-identified] : no palpable masses

## 2022-03-24 NOTE — PROCEDURE
[FreeTextEntry1] : CXR reveals a normal sized heart; mamillated right hemidiaphragm, mildly suggestion of hilar adenopathy \par \par FENO was 7 ; a normal value being less than 25\par Fractional exhaled nitric oxide (FENO) is regarded as a simple, noninvasive method for assessing eosinophilic airway inflammation. Produced by a variety of cells within the lung, nitric oxide (NO) concentrations are generally low in healthy individuals. However, high concentrations of NO appear to be involved in nonspecific host defense mechanisms and chronic inflammatory diseases such as asthma. The American Thoracic Society (ATS) therefore has recommended using FENO to aid in the diagnosis and monitoring of eosinophilic airway inflammation and asthma, and for identifying steroid responsive individuals whose chronic respiratory symptoms may be caused by airway inflammation. \par \par Full PFT revealed normal flows, with a FEV1 of 2.23L,which is 104% of predicted,  normal lung volumes, and normal diffusion of 17.9 , which is 95% of predicted with a normal flow volume loop \par \par CT chest, Abdomen and pelvis (2.15.2022) revealed Multiple mildly enlarged mediastinal and hilar nodes, increased in size compared to 7/10/2020.\par \par Underdistended urinary bladder with questionable wall thickening. Correlate with urinalysis.\par \par PET CT (3.2.2022) revealed Abnormal FDG-PET/CT scan.\par \par 1. FDG avid focus in the left outer breast corresponds to patient's recent biopsy-proven invasive poorly differentiated ductal carcinoma. A 4.4 x 2.3 cm left central breast hematoma with adjacent postprocedural changes as described above.\par \par 2. Indeterminate FDG avid left supraclavicular, mediastinal and hilar lymphadenopathy, new and/or enlarged since 7/10/2020. The symmetric distribution of lymph nodes in the mediastinum and hilar regions suggests a benign etiology. Left supraclavicular lymph node is accessible to an ultrasound-guided percutaneous needle biopsy.\par \par 3. A subcentimeter, minimally FDG-avid left axillary lymph node is nonspecific. This also may be further evaluated with ultrasound and percutaneous needle biopsy.\par \par \par CT chest abdomen and pelvis (7.10.2020) revealed NO PE, NO acute intra abdominal or pelvic pathology on CT. Stable nonspecific small volume mediastinal/hilar nodes\par \par 6 minute walk test reveals a low saturation of 97% with slight dyspnea or fatigue; walked 456.4 meters\par

## 2022-03-24 NOTE — REASON FOR VISIT
The patient's daughter is calling to schedule the patient's first post op appointment. The patient had right hip ORIF on 7/26. Please advise on scheduling.    [Initial] : an initial visit [TextBox_44] : SOB, ?asthma, allergies, GERD, untreated ELVIN, abnormal CT of the chest (adenopathy)

## 2022-03-24 NOTE — HISTORY OF PRESENT ILLNESS
[de-identified] : ARI LOYA  is a 62 year old female here for follow up  for newly diagnosed left breast cancer.\par \par In , pt was diagnosed with T2N0M0 (2.2cm), stage IIA RIGHT breast triple negative invasive ductal carcinoma with Hensel score 8 to 9.  She underwent lumpectomy Dr. Keyon Amezquita and then received adjuvant chemotherapy with taxotere and cytoxan for 4 cycles.  She then received IMRT with 5000cGy in 25 fractions followed by boost to the cavity of 1000cGy in 5 fractions 11-11 with Dr. Veena Walls. \par \par Pt had benign right breast stereotactic biopsy for calcifications in 2012 and s/p benign left breast US guided core biopsy 2018 (4-5:00).\par \par In 2020, screening mammogram/US showed calcifications in left mid breast as well as scattered and occasionally loosely grouped calcifications of central breast, 6mos follow up recommended. \par \par On 3/18/2021, follow up left breast mammogram showed probably benign calcifications in anterior left breast without significant change since 2020. FU in 2021 for stability.  \par \par In 21, bilateral mammo/US showed left breast 2-3:00 4cmfn 1.4cm irregular mass suspicious of malignancy, US core biopsy recommended; grouped heterogenous calcifications at right breast 10:00 anterior depth also suspicious for malignancy, bx recommended, BIRADS4. \par \par Pt underwent left breast 3:00 4cmfn US guided biopsy on 22 which showed invasive poorly differentiated ductal carcinoma, taylor score 8/9 (3+3+2), invasive tumor at least 0.8cm, microcalcifications present, no LVI.  ER-/KY-, HER2 equivocal pending  CISH. \par \par MRI breast 22: \par 1. left breast 3:00 axis biopsy proven cancer with associated 1.4 cm mass enhancement. \par 2. Additional adjacent contiguous large area of linear non mass enhancement seen extending from the biopsy site into retroareolar/central/posterior left breast, up to 9.4 cm in AP dimension. 2 site MRI guided core needle biopsy of the most anterior and posterior portions is recommended if this would alter surgical management. \par 3. No suspicious enhancement in the right breast \par 4. No lymphadenopathy \par MRI guided biopsy BIRADS 4 \par \par CT C/A/P 2/15/22: Multiple mildly enlarged mediastinal and hilar nodes, increased in size compared to 7/10/2020. Underdistended urinary bladder with questionable wall thickening. \par \par Bone scan 2/15/22:  No scan evidence of osseous metastasis. Dengeneratvive disease in the spine and major joints \par \par Left breast biopsy 3/1/22: Fibrocystic change including apocrine adenosis and blood clot and focal mircrcalcificationd\par Right breast biopsy 3/1/22: Proliferative fibrocystic change including sclerosing adenosis with microcalcifications. \par \par Left supraclavicular lymph node FNA 3/15/22: Negative for malignant cells\par \par PET/CT 3/2/22: \par 1. FDG avd focus in the left outer breast corresponds to the pt recent biopsy proven invasive poorly differentiated ductal carcinoma. A 4.4 x 2.3 cm left central breast hematoma with adjacent postprocedural changes as described above. \par 2. Indeterminate FDG avid left supraclavicular, mediastinal and hilar lymphadenopahty, new ad/or enlarged since 7/10/20, The symmetric distribution of lymph nodes in the mediatstium and hilar regions suggests a benign etiology. Left supraclavicular lymph  node is accessible to an US guise percutaneous needle biopsy\par 3. A subcentimeter, minimally FDG avid left axillary lymph node is nonspecific. This alson may be furhter evaluated with US and percutaneous need biopsy \par \par h/o arthritis on duloxetine. \par \par Risk factors: Prior breast disease: as above.  Menarche: 14. Postmenopausal, 2004 after hysterectomy.  4 pregnancies, 5 miscarriages, 4 living children. \par Age of first pregnancy 18.   Breast feedin year each child. Oral contraceptive use: yes, 5 years. Other hormone exposure: None. \par Sister leukemia dx'd age 40. Mother had multiple myeloma.  Brother stomach cancer age 68.  Daughter with CLL age 30.  Paternal 2nd cousin breast cancer age 49. Paternal 2nd cousin liver cancer age 40 had hepatitis. Family history is negative for cancer of the ovary, endometrium, prostate, pancreatic and melanoma. \par The patient is not of Ashkenazi ethnic background. Never had genetic testing.  \par \par HEALTH MAINTENANCE \par PCP Dr. Maty Nunez\par GI Dr. Guo, colonoscopy 5 years, no polyps, due now\par GYN no pap smears, s/p MARK, no vaginal bleeding or spotting\par DERM Dr. Richards and Dr. Ramsey at [place]; no hx of skin cancer\par s/p COVID vaccine Pfizer

## 2022-03-24 NOTE — ASSESSMENT
[FreeTextEntry1] : IMP: 62 year old female present with a new left breast cancer.\par MRI show an extensive area of enhancement area the biopsy site\par CT ( [facility]) shows mediastinal adenopathy\par Subsequent biopsies of the breast and the supraclavicular lymph node were negative\par BRCA: no significant mutation\par \par \par PLAN: \par Bilateral mastectomies with left axillary sentinel node biopsy( patient choice )\par Adjuvant chemotherapy and Herceptin\par \par \par \par \par

## 2022-03-24 NOTE — HISTORY OF PRESENT ILLNESS
[TextBox_4] : Ms. LOYA is a 62 year old female originally [place] from with a history of non smoking, breast cancer originally on the right 2011, s/p lumpectomy with radiation and chemo, arthritis, HTN, ELVIN, newly diagnosed left sided breast tumor (intraductal carcinoma) 2022 presenting to the office today for initial pulmonary evaluation. Her chief complaint is\par \par -she notes lumpectomy was done by Dr Leon \par -she notes generally feeling good \par -she notes constipation \par -she notes dysphagia on saliva and liquids onset couple years\par -she notes intermittent reflux and heartburn \par -she notes active leg swelling and leg pain \par -she denies wheezing and coughing \par -she notes increased sensitivity to smells\par -she denies allergies \par -she notes intermittent CLARK\par -she notes SOB in cold air \par -she denies history of bronchitis\par -she notes arthritis \par -she denies pruritus on back \par -she notes intermittent muscle spasms that interrupts sleeps \par -she notes snoring \par -she notes unable to tolerate CPAP \par -she notes memory and concentration below baseline \par -she notes energy below baseline \par -she denies feeling rested in the morning \par -she notes omeprazole does not fully treat heartburn \par \par -she denies any headaches, nausea, vomiting, fever, chills, sweats, chest pain, chest pressure, diarrhea, dysphagia, dizziness,, itchy eyes, itchy ears, sour taste in the mouth, myalgias

## 2022-03-28 PROBLEM — Z86.69 HISTORY OF SLEEP APNEA: Status: RESOLVED | Noted: 2022-02-11 | Resolved: 2022-03-28

## 2022-03-28 NOTE — PHYSICAL EXAM
[Fully active, able to carry on all pre-disease performance without restriction] : Status 0 - Fully active, able to carry on all pre-disease performance without restriction [Normal] : affect appropriate [de-identified] : left breast retroareolar 3:00 4x3cm mass; right breast no mass, right lumpectomy scar; no axillary LAD b/l

## 2022-03-28 NOTE — HISTORY OF PRESENT ILLNESS
[de-identified] : ARI LOYA  is a 62 year old female here for initial consultation for newly diagnosed left breast cancer.\par \par In , pt was diagnosed with T2N0M0 (2.2cm), stage IIA RIGHT breast triple negative invasive ductal carcinoma with Taylor score 8 to 9.  She underwent lumpectomy Dr. Keyon Amezquita and then received adjuvant chemotherapy with taxotere and cytoxan for 4 cycles.  She then received IMRT with 5000cGy in 25 fractions followed by boost to the cavity of 1000cGy in 5 fractions 11-11 with Dr. Veena Walls. \par \par Pt had benign right breast stereotactic biopsy for calcifications in 2012 and s/p benign left breast US guided core biopsy 2018 (4-5:00).\par \par In 2020, screening mammogram/US showed calcifications in left mid breast as well as scattered and occasionally loosely grouped calcifications of central breast, 6mos follow up recommended. \par \par On 3/18/2021, follow up left breast mammogram showed probably benign calcifications in anterior left breast without significant change since 2020. FU in 2021 for stability.  \par \par In 21, bilateral mammo/US showed left breast 2-3:00 4cmfn 1.4cm irregular mass suspicious of malignancy, US core biopsy recommended; grouped heterogenous calcifications at right breast 10:00 anterior depth also suspicious for malignancy, bx recommended, BIRADS4. \par \par Pt underwent left breast 3:00 4cmfn US guided biopsy on 22 which showed invasive poorly differentiated ductal carcinoma, taylor score 8/9 (3+3+2), invasive tumor at least 0.8cm, microcalcifications present, no LVI.  ER negative <1%, ND negative <1%, HER2 2+ IHC, CISH pending. \par \par MRI breast and CT C/A/P and bone scan pending. \par \par Risk factors: Prior breast disease: as above.  Menarche: 14. Postmenopausal, 2004 after hysterectomy.  4 pregnancies, 5 miscarriages, 4 living children. \par Age of first pregnancy 18.   Breast feedin year each child. Oral contraceptive use: yes, 5 years. Other hormone exposure: None. \par Sister leukemia dx'd age 40. Mother had multiple myeloma.  Brother stomach cancer age 68.  Daughter with CLL age 30.  Paternal 2nd cousin breast cancer age 49. Paternal 2nd cousin liver cancer age 40 had hepatitis. Family history is negative for cancer of the ovary, endometrium, prostate, pancreatic and melanoma. \par The patient is not of Ashkenazi ethnic background. Never had genetic testing.  \par \par HEALTH MAINTENANCE \par PCP Dr. Maty Nunez\par GI Dr. Guo, colonoscopy 5 years, no polyps, due now\par GYN no pap smears, s/p MARK, no vaginal bleeding or spotting\par DERM Dr. Richards and Dr. Ramsey at [place]; no hx of skin cancer\par MSK h/o arthritis on duloxetine. \par s/p COVID vaccine Pfizer

## 2022-03-28 NOTE — REASON FOR VISIT
[Initial Consultation] : an initial consultation [Other: _____] : [unfilled] [Patient Declined  Services] : - None: Patient declined  services [FreeTextEntry2] : breast cancer [FreeTextEntry3] : Daughter

## 2022-03-28 NOTE — ASSESSMENT
[FreeTextEntry1] : ARI LOYA  is a 62 year old female with history of T2N0M0 (2.2cm), stage IIA RIGHT breast triple negative invasive ductal carcinoma, s/p adjuvant TC chemotherapy and RT.  She is here for newly diagnosed contralateral left invasive poorly differentiated ductal carcinoma, cT1cN0, ER negative <1%, NM negative <1%, HER2 positive. She is presenting to [facility] for further management. \par \par -we discussed the role of chemotherapy, surgery and radiation in treating her breast cancer. We discussed that in some instances treatment with chemotherapy could precede surgery for the goal of downstaging the tumor and to have a less extensive surgery. \par -she is planned for MRI guided biopsy of anterior and posterior aspects of the large area of concern to determine neoadjuvant vs adjuvant chemotherapy.\par -we reviewed potential treatment options with combination of chemotherapy and herceptin treatment. We reviewed side effects including but not limited to bone marrow suppression, alopecia, fatigue, infusion reaction, body aches, nausea, vomiting, mucositis, neuropathy, liver/pulmonary toxicity, cardiomyopathy and rare risk of hematologic malignancy. \par -staging scans including CT C/A/P and bone scan pending. \par -pt will follow up after above workup\par -patient was given the time to ask questions which I answered to the best of my ability and to her apparent satisfaction. I encouraged her to call me with any additional questions. \par \par ------------------\par \par ADDENDUM 3/28/22:\par CT C/A/P showed multiple mildly enlarged mediastinal and hilar nodes increased since 7/2020. \par Bone scan negative for osseous metastases.\par PET scan subsequently showed indeterminate left supraclavicular LN as well as mediastinal and hilar LAD likely of benign etiology.\par Pt underwent supraclavicular LN biopsy which is negative.\par Reviewed images personally with radiology, her mediastinal and hilar LNs does not appear malignancy related, possibly ?sarcoid. \par She saw pulm Dr. Justice Mcdonald on 3/24, rec ACE, ESR, baroscopy with EBUS with Dr. Herzog.\par Planned for breast surgery with Dr. Leon and then adjuvant tx.

## 2022-03-29 ENCOUNTER — OUTPATIENT (OUTPATIENT)
Dept: OUTPATIENT SERVICES | Facility: HOSPITAL | Age: 62
LOS: 1 days | End: 2022-03-29
Payer: MEDICAID

## 2022-03-29 VITALS
DIASTOLIC BLOOD PRESSURE: 71 MMHG | HEART RATE: 66 BPM | OXYGEN SATURATION: 98 % | SYSTOLIC BLOOD PRESSURE: 138 MMHG | TEMPERATURE: 98 F | WEIGHT: 143.96 LBS | HEIGHT: 62 IN | RESPIRATION RATE: 18 BRPM

## 2022-03-29 DIAGNOSIS — Z98.890 OTHER SPECIFIED POSTPROCEDURAL STATES: Chronic | ICD-10-CM

## 2022-03-29 DIAGNOSIS — M19.90 UNSPECIFIED OSTEOARTHRITIS, UNSPECIFIED SITE: ICD-10-CM

## 2022-03-29 DIAGNOSIS — C50.912 MALIGNANT NEOPLASM OF UNSPECIFIED SITE OF LEFT FEMALE BREAST: ICD-10-CM

## 2022-03-29 DIAGNOSIS — Z90.710 ACQUIRED ABSENCE OF BOTH CERVIX AND UTERUS: Chronic | ICD-10-CM

## 2022-03-29 DIAGNOSIS — Z91.89 OTHER SPECIFIED PERSONAL RISK FACTORS, NOT ELSEWHERE CLASSIFIED: ICD-10-CM

## 2022-03-29 DIAGNOSIS — Z01.818 ENCOUNTER FOR OTHER PREPROCEDURAL EXAMINATION: ICD-10-CM

## 2022-03-29 DIAGNOSIS — R73.03 PREDIABETES: ICD-10-CM

## 2022-03-29 DIAGNOSIS — I10 ESSENTIAL (PRIMARY) HYPERTENSION: ICD-10-CM

## 2022-03-29 DIAGNOSIS — E78.5 HYPERLIPIDEMIA, UNSPECIFIED: ICD-10-CM

## 2022-03-29 DIAGNOSIS — J45.909 UNSPECIFIED ASTHMA, UNCOMPLICATED: ICD-10-CM

## 2022-03-29 DIAGNOSIS — G47.33 OBSTRUCTIVE SLEEP APNEA (ADULT) (PEDIATRIC): ICD-10-CM

## 2022-03-29 LAB
ALBUMIN SERPL ELPH-MCNC: 3.9 G/DL — SIGNIFICANT CHANGE UP (ref 3.5–5)
ALLERGY+IMMUNOLOGY DIAG STUDY NOTE: SIGNIFICANT CHANGE UP
ALP SERPL-CCNC: 105 U/L — SIGNIFICANT CHANGE UP (ref 40–120)
ALT FLD-CCNC: 34 U/L DA — SIGNIFICANT CHANGE UP (ref 10–60)
ANION GAP SERPL CALC-SCNC: 6 MMOL/L — SIGNIFICANT CHANGE UP (ref 5–17)
APPEARANCE UR: CLEAR — SIGNIFICANT CHANGE UP
APTT BLD: 33.9 SEC — SIGNIFICANT CHANGE UP (ref 27.5–35.5)
AST SERPL-CCNC: 19 U/L — SIGNIFICANT CHANGE UP (ref 10–40)
BACTERIA # UR AUTO: ABNORMAL /HPF
BILIRUB SERPL-MCNC: 0.2 MG/DL — SIGNIFICANT CHANGE UP (ref 0.2–1.2)
BILIRUB UR-MCNC: NEGATIVE — SIGNIFICANT CHANGE UP
BLD GP AB SCN SERPL QL: SIGNIFICANT CHANGE UP
BUN SERPL-MCNC: 13 MG/DL — SIGNIFICANT CHANGE UP (ref 7–18)
CALCIUM SERPL-MCNC: 8.6 MG/DL — SIGNIFICANT CHANGE UP (ref 8.4–10.5)
CHLORIDE SERPL-SCNC: 99 MMOL/L — SIGNIFICANT CHANGE UP (ref 96–108)
CO2 SERPL-SCNC: 29 MMOL/L — SIGNIFICANT CHANGE UP (ref 22–31)
COLOR SPEC: YELLOW — SIGNIFICANT CHANGE UP
CREAT SERPL-MCNC: 0.7 MG/DL — SIGNIFICANT CHANGE UP (ref 0.5–1.3)
DIFF PNL FLD: NEGATIVE — SIGNIFICANT CHANGE UP
EGFR: 98 ML/MIN/1.73M2 — SIGNIFICANT CHANGE UP
EPI CELLS # UR: ABNORMAL /HPF
GLUCOSE SERPL-MCNC: 110 MG/DL — HIGH (ref 70–99)
GLUCOSE UR QL: NEGATIVE — SIGNIFICANT CHANGE UP
HCT VFR BLD CALC: 35.6 % — SIGNIFICANT CHANGE UP (ref 34.5–45)
HGB BLD-MCNC: 11.8 G/DL — SIGNIFICANT CHANGE UP (ref 11.5–15.5)
INR BLD: 1.07 RATIO — SIGNIFICANT CHANGE UP (ref 0.88–1.16)
KETONES UR-MCNC: NEGATIVE — SIGNIFICANT CHANGE UP
LEUKOCYTE ESTERASE UR-ACNC: NEGATIVE — SIGNIFICANT CHANGE UP
MCHC RBC-ENTMCNC: 28.9 PG — SIGNIFICANT CHANGE UP (ref 27–34)
MCHC RBC-ENTMCNC: 33.1 GM/DL — SIGNIFICANT CHANGE UP (ref 32–36)
MCV RBC AUTO: 87 FL — SIGNIFICANT CHANGE UP (ref 80–100)
NITRITE UR-MCNC: NEGATIVE — SIGNIFICANT CHANGE UP
NRBC # BLD: 0 /100 WBCS — SIGNIFICANT CHANGE UP (ref 0–0)
PH UR: 6.5 — SIGNIFICANT CHANGE UP (ref 5–8)
PLATELET # BLD AUTO: 270 K/UL — SIGNIFICANT CHANGE UP (ref 150–400)
POTASSIUM SERPL-MCNC: 3.7 MMOL/L — SIGNIFICANT CHANGE UP (ref 3.5–5.3)
POTASSIUM SERPL-SCNC: 3.7 MMOL/L — SIGNIFICANT CHANGE UP (ref 3.5–5.3)
PROT SERPL-MCNC: 7.7 G/DL — SIGNIFICANT CHANGE UP (ref 6–8.3)
PROT UR-MCNC: 15
PROTHROM AB SERPL-ACNC: 12.7 SEC — SIGNIFICANT CHANGE UP (ref 10.5–13.4)
RBC # BLD: 4.09 M/UL — SIGNIFICANT CHANGE UP (ref 3.8–5.2)
RBC # FLD: 13.2 % — SIGNIFICANT CHANGE UP (ref 10.3–14.5)
RBC CASTS # UR COMP ASSIST: SIGNIFICANT CHANGE UP /HPF (ref 0–2)
SODIUM SERPL-SCNC: 134 MMOL/L — LOW (ref 135–145)
SP GR SPEC: 1.01 — SIGNIFICANT CHANGE UP (ref 1.01–1.02)
UROBILINOGEN FLD QL: NEGATIVE — SIGNIFICANT CHANGE UP
WBC # BLD: 5.6 K/UL — SIGNIFICANT CHANGE UP (ref 3.8–10.5)
WBC # FLD AUTO: 5.6 K/UL — SIGNIFICANT CHANGE UP (ref 3.8–10.5)
WBC UR QL: SIGNIFICANT CHANGE UP /HPF (ref 0–5)

## 2022-03-29 PROCEDURE — G0463: CPT

## 2022-03-29 RX ORDER — OMEPRAZOLE 10 MG/1
1 CAPSULE, DELAYED RELEASE ORAL
Qty: 0 | Refills: 0 | DISCHARGE

## 2022-03-29 NOTE — H&P PST ADULT - PROBLEM SELECTOR PLAN 2
Last hemoglobin A1C unknown. Repeated during PST visit.  Perioperative glucose monitoring and coverage as needed. Follow-up with PCP for diabetic management.

## 2022-03-29 NOTE — H&P PST ADULT - HISTORY OF PRESENT ILLNESS
This is a 62 year old Russian female with PMH of asthma, right breast cancer treated with chemotherapy, radiation therapy, This is a 62 year old Russian female with PMH of asthma, prediabetes, right breast cancer treated with chemotherapy, radiation therapy, and lumpectomy, ELVIN-does not use CPAP due to discomfort, HTN, HLD, GERD, OA, varicose veins of lower extremities, who presents with c/o left breast cancer. Pt is scheduled for bilateral mastectomies, left axillary sentinel node biopsy on 4/4/2022.

## 2022-03-29 NOTE — H&P PST ADULT - PROBLEM SELECTOR PLAN 5
Pt with varicose veins to lower extremities. VTE score risk 8. Perioperative VTE prophylaxis to be maintained. Follow-up with PCP for management of varicose veins.

## 2022-03-29 NOTE — H&P PST ADULT - ASSESSMENT
This is a 62 year old Russian female with PMH of asthma, prediabetes, right breast cancer treated with chemotherapy, radiation therapy, and lumpectomy, ELVIN-does not use CPAP due to discomfort, HTN, HLD, GERD, OA, varicose veins of lower extremities, who presents with left breast cancer. Pt is scheduled for bilateral mastectomies, left axillary sentinel node biopsy on 4/4/2022.  Pt diagnosed with ELVIN-does not use CPAP due to discomfort. Pt is to be evaluated for oral appliance. Perioperative pulmonary precautions to be maintained.

## 2022-03-29 NOTE — H&P PST ADULT - PROBLEM SELECTOR PLAN 4
Pt diagnosed with ELVIN-does not use CPAP due to discomfort. Pt is to be evaluated for oral appliance. Perioperative pulmonary precautions to be maintained.

## 2022-03-29 NOTE — H&P PST ADULT - NSICDXFAMILYHX_GEN_ALL_CORE_FT
FAMILY HISTORY:  Mother  Still living? Unknown  Family history of leukemia, Age at diagnosis: Age Unknown    Child  Still living? Yes, Estimated age: Age Unknown  Family history of leukemia, Age at diagnosis: Age Unknown

## 2022-03-29 NOTE — H&P PST ADULT - LOCATION OF BACK PAIN
c/o pain in thoracic/thoracic spine/lumbar spine c/o pain in back and shoulders/thoracic spine/lumbar spine

## 2022-03-29 NOTE — H&P PST ADULT - FALL HARM RISK - UNIVERSAL INTERVENTIONS
Bed in lowest position, wheels locked, appropriate side rails in place/Call bell, personal items and telephone in reach/Instruct patient to call for assistance before getting out of bed or chair/Non-slip footwear when patient is out of bed/[place] to call system/Physically safe environment - no spills, clutter or unnecessary equipment/Purposeful Proactive Rounding/Room/bathroom lighting operational, light cord in reach

## 2022-03-29 NOTE — H&P PST ADULT - PROBLEM SELECTOR PLAN 1
Bilateral mastectomies, left axillary sentinel node biopsy on 4/4/2022. As per pt's daughter rosalino, pt will be optimized by PCP, Cardiology and pulmonology for surgery.  Preoperative instructions discussed with pt via [place] speaking [place]  [id number] and given to pt. Instructed pt that she will need someone to escort her home after surgery, to notify security when she arrives in the lobby of the hospital that she is here for surgery, not to eat or drink anything after midnight the night before the surgery, to avoid NSAIDs such as Ibuprofen, Motrin, Aleve, Advil, Naproxen before surgery, to take Tylenol if needed for pain, to report if she has been exposed to any one with any contagious diseases including Covid-19 or if she is exhibiting any symptoms of COVID-19, to keep appointment for COVID-19  test 3 days before surgery. Instructed about use of Chlorhexidine 4% soap for 3 days before surgery including the morning of the surgery to prevent infection. Verbalized understanding of instructions given.   Pt diagnosed with ELVIN-does not use CPAP due to discomfort. Pt is to be evaluated for oral appliance. Perioperative pulmonary precautions to be maintained.

## 2022-03-29 NOTE — H&P PST ADULT - RESPIRATORY AND THORAX COMMENTS
ELVIN-does not use CPAP machine due to discomfort ELVIN-does not use CPAP machine due to discomfort-to be evaluated for oral appliance

## 2022-03-29 NOTE — H&P PST ADULT - PROBLEM SELECTOR PLAN 8
C/o pain in back and shoulders. Pt instructed to avoid NSAIDs 1 week before surgery.  Advised to take Tylenol as needed for pain. Stated understanding of instructions given.

## 2022-03-29 NOTE — H&P PST ADULT - NSICDXPASTSURGICALHX_GEN_ALL_CORE_FT
PAST SURGICAL HISTORY:  Appendicitis, Acute appendectomy    H/O blepharoplasty     History of hysterectomy     History of varicose vein ligation and stripping     Lump in Breast     Tonsillitis

## 2022-03-29 NOTE — H&P PST ADULT - PROBLEM SELECTOR PLAN 3
Pt c/o feeling SOB with cold air. To be seen by pulmonologist before surgery. Instructed to continue use of inhaler and use it same on day of surgery. Instructed to bring inhalers to hospital on day of surgery. Follow-up with PCP postop for management

## 2022-03-29 NOTE — H&P PST ADULT - VENOUS THROMBOEMBOLISM CURRENT STATUS
(1) swollen legs (current)/(1) varicose veins/(1) other risk factor (includes escalating BMI, pack-years of smoking, diabetes requiring insulin, chemotherapy, female gender and length of surgery)/(2) malignancy (present or previous)

## 2022-03-29 NOTE — H&P PST ADULT - PROBLEM SELECTOR PLAN 6
Instructed to continue antihypertensive med and take it with sips of water on day of surgery.  Follow-up with PCP postop for management.

## 2022-03-29 NOTE — H&P PST ADULT - NSICDXPASTMEDICALHX_GEN_ALL_CORE_FT
PAST MEDICAL HISTORY:  Breast cancer right    GERD (gastroesophageal reflux disease)     HLD (hyperlipidemia)     HTN (hypertension)      PAST MEDICAL HISTORY:  Breast cancer right    GERD (gastroesophageal reflux disease)     HLD (hyperlipidemia)     HTN (hypertension)     ELVIN (obstructive sleep apnea) not using CPAP    Osteoarthritis     Prediabetes     Varicose veins of both lower extremities

## 2022-03-30 LAB
A1C WITH ESTIMATED AVERAGE GLUCOSE RESULT: 5.7 % — HIGH (ref 4–5.6)
ESTIMATED AVERAGE GLUCOSE: 117 MG/DL — HIGH (ref 68–114)

## 2022-03-31 ENCOUNTER — APPOINTMENT (OUTPATIENT)
Dept: CARDIOLOGY | Facility: CLINIC | Age: 62
End: 2022-03-31
Payer: MEDICAID

## 2022-03-31 ENCOUNTER — NON-APPOINTMENT (OUTPATIENT)
Age: 62
End: 2022-03-31

## 2022-03-31 VITALS
OXYGEN SATURATION: 100 % | WEIGHT: 144 LBS | BODY MASS INDEX: 26.5 KG/M2 | SYSTOLIC BLOOD PRESSURE: 144 MMHG | DIASTOLIC BLOOD PRESSURE: 81 MMHG | HEART RATE: 71 BPM | HEIGHT: 62 IN

## 2022-03-31 PROCEDURE — 99204 OFFICE O/P NEW MOD 45 MIN: CPT

## 2022-03-31 PROCEDURE — 93000 ELECTROCARDIOGRAM COMPLETE: CPT

## 2022-03-31 NOTE — HISTORY OF PRESENT ILLNESS
[FreeTextEntry1] : ARI LOYA is a 62 year woman with a history of right sided invasive ductal carcinoma (triple negative) treated with lumpectomy, adjuvant TH and radiation (6000 cGy) in 2011, now with left intraductal carcinoma who comes today with her daughter for cardiac evaluation prior to bilateral mastectomy.\par \par Prior cancer treatment:\par T2N0M0- invasive ductal carcinoma. ER/AL negative, Her2 negative. She was treated with 4 cycles of adjuvant TC along with neulasta support. Her treatment with complicated by bone pains due to the neulasta. She then received IMRT with 5000cGy in 25 fractions followed by boost to the cavity of 1000cGy in 5 fractions- this was done from 11/9/11-12/21/11.\par \par \par She has a history of hypertension, ELVIN not able to tolerate CPAP, and chronic dyspnea on exertion for which she saw Pulmonologist last week. She denies any chest pain. She has a history of syncope both on hot days in setting of dehydration she says. No known cardiovascular disease. She reports and treadmill exercise stress test done a few months ago by Dr. Whitney at [facility] was normal. She can currently complete a flight of stairs up to her apartment without stopping.\par \par She has no history of diabetes, cigarette smoking. Several family members had heart disease - her brother had MI in late 50s. Another brother had a pacemaker placed. A daughter required surgery for valve problem.\par \par \par \par

## 2022-03-31 NOTE — ASSESSMENT
[FreeTextEntry1] : 62 year old woman with history of right breast radiation (6000 cGy) in 2011, hypertension, ELVIN, and chronic CLARK which appears multifactorial. Her ECG is normal. She has no prominent atherosclerotic disease on recent chest CT. Examination with no murmurs and normal pulses and no carotid bruit.\par \par She may benefit from an exercise stress test with echocardiogram for further evaluation of her symptoms of dyspnea which could be related to prior radiation, autonomic changes, and suboptimally controlled hypertension, but she is optimized to proceed with surgery from the cardiovascular point of view.\par \par I made no changes to her medications today but would like her to monitor her blood pressure over the next few weeks.\par \par Follow up in 3-4 weeks once treatment plan is established to determine for further recommendations.\par \par \par

## 2022-03-31 NOTE — CARDIOLOGY SUMMARY
[de-identified] : 3/31/2022: NSR and normal ECG\par  [de-identified] : CT chest February 2022: hilar adenopathy, normal vessels. No CACs.

## 2022-03-31 NOTE — PHYSICAL EXAM
[Normal] : well developed, well nourished, no acute distress [Normal Conjunctiva] : normal conjunctiva [No Xanthelasma] : no xanthelasma [Normal Venous Pressure] : normal venous pressure [No Carotid Bruit] : no carotid bruit [Normal S1, S2] : normal S1, S2 [No Murmur] : no murmur [No Rub] : no rub [No Gallop] : no gallop [Clear Lung Fields] : clear lung fields [Good Air Entry] : good air entry [No Respiratory Distress] : no respiratory distress  [Soft] : abdomen soft [Non Tender] : non-tender [Normal Gait] : normal gait [Gait - Sufficient for Exercise Testing] : gait - sufficient for exercise testing [No Edema] : no edema [No Cyanosis] : no cyanosis [No Clubbing] : no clubbing [No Varicosities] : no varicosities [Moves all extremities] : moves all extremities [No Focal Deficits] : no focal deficits [Alert and Oriented] : alert and oriented

## 2022-03-31 NOTE — REASON FOR VISIT
[CV Risk Factors and Non-Cardiac Disease] : CV risk factors and non-cardiac disease [Family Member] : family member [FreeTextEntry3] : Dr. Nick Leon

## 2022-04-01 PROBLEM — I83.93 ASYMPTOMATIC VARICOSE VEINS OF BILATERAL LOWER EXTREMITIES: Chronic | Status: ACTIVE | Noted: 2022-03-29

## 2022-04-01 PROBLEM — M19.90 UNSPECIFIED OSTEOARTHRITIS, UNSPECIFIED SITE: Chronic | Status: ACTIVE | Noted: 2022-03-29

## 2022-04-01 PROBLEM — G47.33 OBSTRUCTIVE SLEEP APNEA (ADULT) (PEDIATRIC): Chronic | Status: ACTIVE | Noted: 2022-03-29

## 2022-04-01 RX ORDER — FLUTICASONE FUROATE AND VILANTEROL TRIFENATATE 100; 25 UG/1; UG/1
100-25 POWDER RESPIRATORY (INHALATION)
Qty: 1 | Refills: 1 | Status: DISCONTINUED | COMMUNITY
Start: 2022-03-24 | End: 2022-04-01

## 2022-04-03 ENCOUNTER — OUTPATIENT (OUTPATIENT)
Dept: OUTPATIENT SERVICES | Facility: HOSPITAL | Age: 62
LOS: 1 days | Discharge: HOME | End: 2022-04-03

## 2022-04-03 DIAGNOSIS — Z90.710 ACQUIRED ABSENCE OF BOTH CERVIX AND UTERUS: Chronic | ICD-10-CM

## 2022-04-03 DIAGNOSIS — Z98.890 OTHER SPECIFIED POSTPROCEDURAL STATES: Chronic | ICD-10-CM

## 2022-04-03 DIAGNOSIS — C50.919 MALIGNANT NEOPLASM OF UNSPECIFIED SITE OF UNSPECIFIED FEMALE BREAST: ICD-10-CM

## 2022-04-04 ENCOUNTER — APPOINTMENT (OUTPATIENT)
Dept: SURGICAL ONCOLOGY | Facility: HOSPITAL | Age: 62
End: 2022-04-04

## 2022-04-04 ENCOUNTER — APPOINTMENT (OUTPATIENT)
Dept: CARDIOLOGY | Facility: CLINIC | Age: 62
End: 2022-04-04

## 2022-04-07 ENCOUNTER — APPOINTMENT (OUTPATIENT)
Dept: HEMATOLOGY ONCOLOGY | Facility: CLINIC | Age: 62
End: 2022-04-07

## 2022-04-11 ENCOUNTER — APPOINTMENT (OUTPATIENT)
Dept: PULMONOLOGY | Facility: CLINIC | Age: 62
End: 2022-04-11
Payer: MEDICAID

## 2022-04-11 PROCEDURE — 99205 OFFICE O/P NEW HI 60 MIN: CPT | Mod: 95

## 2022-04-11 NOTE — DISCUSSION/SUMMARY
[FreeTextEntry1] : The patients most recent CT scan was reviewed by my independently and my interpretation is stated below:\par \par CT demonstrated bilateral metastatic hilar adenopathy with mild PET uptake.  Previous CT has demonstrated a hilar but mediastinal apathy though this one looks larger.

## 2022-04-11 NOTE — REASON FOR VISIT
[Home] : at home, [unfilled] , at the time of the visit. [Medical Office: ([facility])___] : at the medical office located in  [Verbal consent obtained from patient] : the patient, [unfilled] [Consultation] : a consultation

## 2022-04-11 NOTE — PHYSICAL EXAM
[No Resp Distress] : no resp distress [Oriented x3] : oriented x3 [Normal Mood] : normal mood [Normal Affect] : normal affect [TextBox_2] : Limited exam - Tele visit [TextBox_11] : Limited exam - Tele visit [TextBox_44] : Limited exam - Tele visit [TextBox_54] : Limited exam - Tele visit [TextBox_68] : Limited exam - Tele visit [TextBox_80] : Limited exam - Tele visit [TextBox_89] : Limited exam - Tele visit [TextBox_99] : Limited exam - Tele visit [TextBox_105] : Limited exam - Tele visit [TextBox_125] : Limited exam - Tele visit [TextBox_132] : Limited exam - Tele visit

## 2022-04-11 NOTE — ASSESSMENT
[FreeTextEntry1] : \par In summary this is a 62-year-old female with a history of breast cancer now with recurrence and plan for mastectomy.  The patient incidentally was noted to have mediastinal adenopathy which is mildly PET avid.  The patient was referred to the interventional pulmonology clinic for diagnostic procedure.  The differential diagnosis of baseline neuropathy including sarcoidosis malignancy amongst others were discussed in detail.  We also discussed the possibility of inflammation.\par \par Based upon the information from the patient the patient is already planned for mastectomy.  The surgical team does not feel that the adenopathy is related to her underlying breast cancer.  Given the fact that the patient is planned for surgery on April 25 we will not proceed with diagnostic procedure at this time.  We will follow up the patient in 1 month that is 2 weeks after the procedure and assess her diagnostic plan for the mediastinal adenopathy.\par \par We discussed the role of the diagnostic procedure and discussed the role of endobronchial ultrasound for diagnosis.  We discussed the role of elective bronchoscopy. The risk and benefits of EBUS including the risk of bleeding and risk of pneumothorax was discussed with the patient and he demonstrated understanding. The diagnostic yield of the procedure as well as alternate diagnostic modalities were discussed with the patient and all questions were answered. The diagnostic yield for EBUS TBNA and FNB for sarcoidosis was discussed. In addition, in case evidence of sarcoidosis is not seen on EBUS on HUGO, the additional yield of endobronchial biopsy was discussed. The risk and benefits of EBUS including the risk of bleeding and risk of pneumothorax was discussed with the patient and he demonstrated understanding. The risk and benefits of bronchoscopy with transbronchial biopsy including risk of bleeding and  risk pneumothorax was discussed with the patient and they demonstrated understanding.\par \par Anmol Sanford MD\par Director of Interventional Pulmonology & Bronchoscopy\par . \par Division of Pulmonary, Critical Care & Sleep Medicine\par [facility] at \[facility]\""/[facility].\par \par \par \par

## 2022-04-11 NOTE — CONSULT LETTER
[Dear  ___] : Dear  [unfilled], [Consult Letter:] : I had the pleasure of evaluating your patient, [unfilled]. [Please see my note below.] : Please see my note below. [Consult Closing:] : Thank you very much for allowing me to participate in the care of this patient.  If you have any questions, please do not hesitate to contact me. [Sincerely,] : Sincerely, [FreeTextEntry3] : Anmol Sanford MD\par Director of Interventional Pulmonology & Bronchoscopy\par . \par Division of Pulmonary, Critical Care & Sleep Medicine\par [facility].\par \par

## 2022-04-11 NOTE — REVIEW OF SYSTEMS
[Fever] : no fever [Fatigue] : no fatigue [Dry Eyes] : no dry eyes [Cough] : no cough [Hemoptysis] : no hemoptysis [Chest Tightness] : chest tightness [Sputum] : no sputum [Dyspnea] : no dyspnea [Pleuritic Pain] : no pleuritic pain [Chest Discomfort] : no chest discomfort [Hay Fever] : no hay fever [Watery Eyes] : no watery eyes [GERD] : gerd [Nocturia] : no nocturia [Arthralgias] : no arthralgias [Myalgias] : no myalgias [Raynaud] : no raynaud [Rash] : no rash [Anemia] : no anemia [Blood Transfusion] : no blood transfusion [Headache] : no headache [Dizziness] : no dizziness [Depression] : no depression [Diabetes] : no diabetes

## 2022-04-11 NOTE — HISTORY OF PRESENT ILLNESS
[TextBox_4] : \par Interventional Pulmonology Consultation/Visit Note\par \par This visit was performed via telehealth.  The daughter served as the [place] .  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  She then received adjuvant chemotherapy.  Followed by IMRT.  In December 21 she was noted to have irregular mass suspicious for malignancy and ultrasound biopsy was performed.  The biopsy demonstrated poorly differentiated ductal carcinoma.\par \par The patient is currently being planned for bilateral mastectomy.\par \par She had subsequent biopsies of the supraclavicular lymph node which were negative.  During this work-up she had a CT of the chest performed Which demonstrated mediastinal adenopathy.  She also the PET scan which demonstrated mild uptake in the mediastinal lymph nodes.  She also had bilateral hilar hilar lymph nodes.\par \par She is currently planned for bilateral mastectomy.  She was referred to the interventional pulmonology clinic by Dr. Justice Mcdonald.  The patient was referred for endobronchial also guided lymph node biopsy for the hilar and mediastinal adenopathy.  The patient is currently being planned for mastectomy and the surgical team does not feel that the hilar and mediastinal lymph nodes are malignant.  The patient is already planned for surgery for April 25.\par

## 2022-04-12 ENCOUNTER — NON-APPOINTMENT (OUTPATIENT)
Age: 62
End: 2022-04-12

## 2022-04-17 NOTE — ED ADULT NURSE REASSESSMENT NOTE - NS ED NURSE REASSESS COMMENT FT1
Pt provided ginger ale and crackers for PO challenge. Pt instructed to call for assistance if she feels nauseous, verbalized understanding. Yes...

## 2022-04-21 ENCOUNTER — OUTPATIENT (OUTPATIENT)
Dept: OUTPATIENT SERVICES | Facility: HOSPITAL | Age: 62
LOS: 1 days | End: 2022-04-21
Payer: MEDICAID

## 2022-04-21 VITALS
DIASTOLIC BLOOD PRESSURE: 85 MMHG | WEIGHT: 147.93 LBS | HEIGHT: 62 IN | HEART RATE: 77 BPM | SYSTOLIC BLOOD PRESSURE: 164 MMHG | OXYGEN SATURATION: 98 % | RESPIRATION RATE: 18 BRPM | TEMPERATURE: 98 F

## 2022-04-21 DIAGNOSIS — C50.912 MALIGNANT NEOPLASM OF UNSPECIFIED SITE OF LEFT FEMALE BREAST: ICD-10-CM

## 2022-04-21 DIAGNOSIS — I10 ESSENTIAL (PRIMARY) HYPERTENSION: ICD-10-CM

## 2022-04-21 DIAGNOSIS — Z98.890 OTHER SPECIFIED POSTPROCEDURAL STATES: Chronic | ICD-10-CM

## 2022-04-21 DIAGNOSIS — Z01.818 ENCOUNTER FOR OTHER PREPROCEDURAL EXAMINATION: ICD-10-CM

## 2022-04-21 DIAGNOSIS — G47.33 OBSTRUCTIVE SLEEP APNEA (ADULT) (PEDIATRIC): ICD-10-CM

## 2022-04-21 DIAGNOSIS — K21.9 GASTRO-ESOPHAGEAL REFLUX DISEASE WITHOUT ESOPHAGITIS: ICD-10-CM

## 2022-04-21 DIAGNOSIS — Z90.710 ACQUIRED ABSENCE OF BOTH CERVIX AND UTERUS: Chronic | ICD-10-CM

## 2022-04-21 LAB
ANION GAP SERPL CALC-SCNC: 7 MMOL/L — SIGNIFICANT CHANGE UP (ref 5–17)
APTT BLD: 36.2 SEC — HIGH (ref 27.5–35.5)
BLD GP AB SCN SERPL QL: SIGNIFICANT CHANGE UP
BUN SERPL-MCNC: 12 MG/DL — SIGNIFICANT CHANGE UP (ref 7–18)
CALCIUM SERPL-MCNC: 9.1 MG/DL — SIGNIFICANT CHANGE UP (ref 8.4–10.5)
CHLORIDE SERPL-SCNC: 103 MMOL/L — SIGNIFICANT CHANGE UP (ref 96–108)
CO2 SERPL-SCNC: 28 MMOL/L — SIGNIFICANT CHANGE UP (ref 22–31)
CREAT SERPL-MCNC: 0.54 MG/DL — SIGNIFICANT CHANGE UP (ref 0.5–1.3)
EGFR: 104 ML/MIN/1.73M2 — SIGNIFICANT CHANGE UP
GLUCOSE SERPL-MCNC: 88 MG/DL — SIGNIFICANT CHANGE UP (ref 70–99)
HCT VFR BLD CALC: 37.4 % — SIGNIFICANT CHANGE UP (ref 34.5–45)
HGB BLD-MCNC: 12 G/DL — SIGNIFICANT CHANGE UP (ref 11.5–15.5)
INR BLD: 1.02 RATIO — SIGNIFICANT CHANGE UP (ref 0.88–1.16)
MCHC RBC-ENTMCNC: 28.6 PG — SIGNIFICANT CHANGE UP (ref 27–34)
MCHC RBC-ENTMCNC: 32.1 GM/DL — SIGNIFICANT CHANGE UP (ref 32–36)
MCV RBC AUTO: 89 FL — SIGNIFICANT CHANGE UP (ref 80–100)
NRBC # BLD: 0 /100 WBCS — SIGNIFICANT CHANGE UP (ref 0–0)
PLATELET # BLD AUTO: 276 K/UL — SIGNIFICANT CHANGE UP (ref 150–400)
POTASSIUM SERPL-MCNC: 3.8 MMOL/L — SIGNIFICANT CHANGE UP (ref 3.5–5.3)
POTASSIUM SERPL-SCNC: 3.8 MMOL/L — SIGNIFICANT CHANGE UP (ref 3.5–5.3)
PROTHROM AB SERPL-ACNC: 12.1 SEC — SIGNIFICANT CHANGE UP (ref 10.5–13.4)
RBC # BLD: 4.2 M/UL — SIGNIFICANT CHANGE UP (ref 3.8–5.2)
RBC # FLD: 13.3 % — SIGNIFICANT CHANGE UP (ref 10.3–14.5)
SODIUM SERPL-SCNC: 138 MMOL/L — SIGNIFICANT CHANGE UP (ref 135–145)
WBC # BLD: 4.72 K/UL — SIGNIFICANT CHANGE UP (ref 3.8–10.5)
WBC # FLD AUTO: 4.72 K/UL — SIGNIFICANT CHANGE UP (ref 3.8–10.5)

## 2022-04-21 PROCEDURE — G0463: CPT

## 2022-04-21 RX ORDER — SODIUM CHLORIDE 9 MG/ML
3 INJECTION INTRAMUSCULAR; INTRAVENOUS; SUBCUTANEOUS EVERY 8 HOURS
Refills: 0 | Status: DISCONTINUED | OUTPATIENT
Start: 2022-04-26 | End: 2022-04-27

## 2022-04-21 NOTE — H&P PST ADULT - PROBLEM SELECTOR PLAN 1
Bilateral mastectomies, left axillary sentinel node biopsy on 4/4/2022. As per pt's daughter rosalino, pt will be optimized by PCP, Cardiology and pulmonology for surgery.  Preoperative instructions discussed with pt via [place] speaking [place]  [phone] and given to pt. Instructed pt that she will need someone to escort her home after surgery, to notify security when she arrives in the lobby of the hospital that she is here for surgery, not to eat or drink anything after midnight the night before the surgery, to avoid NSAIDs such as Ibuprofen, Motrin, Aleve, Advil, Naproxen before surgery, to take Tylenol if needed for pain, to report if she has been exposed to any one with any contagious diseases including Covid-19 or if she is exhibiting any symptoms of COVID-19, to keep appointment for COVID-19  test 3 days before surgery. Instructed about use of Chlorhexidine 4% soap for 3 days before surgery including the morning of the surgery to prevent infection. Verbalized understanding of instructions given.   Pt diagnosed with ELVIN-does not use CPAP due to discomfort. Pt is to be evaluated for oral appliance. Perioperative pulmonary precautions to be maintained. bilateral mastectomies, left axillary sentinel node biopsy    pt seen PCP for evaluation prior to surgery, report to be obtained  will obtained echo/stress report from cardiologist

## 2022-04-21 NOTE — H&P PST ADULT - SURGICAL SITE INCISION
Certainly  Referral in, specifying for [employer]  I believe [address] has it, but [facility] more locally is an option too 
Patient made aware of below  States that he is going to go to the [address] [facility] location  He said he spoke to them the other day and will make an appointment there 
Pt called and states that that he was given OT referral last year but never scheduled this   Pt is interested in rock steady program  Requesting PT referral                   [phone] ok to leave detailed message
no

## 2022-04-21 NOTE — H&P PST ADULT - NSICDXPASTMEDICALHX_GEN_ALL_CORE_FT
PAST MEDICAL HISTORY:  Breast cancer right    GERD (gastroesophageal reflux disease)     HLD (hyperlipidemia)     HTN (hypertension)     ELVIN (obstructive sleep apnea) not using CPAP    Osteoarthritis     Prediabetes     Varicose veins of both lower extremities

## 2022-04-21 NOTE — H&P PST ADULT - HISTORY OF PRESENT ILLNESS
This is a 62 year old Russian female with PMH of asthma, prediabetes, right breast cancer treated with chemotherapy, radiation therapy, and lumpectomy, ELVIN-does not use CPAP due to discomfort, HTN, HLD, GERD, OA, varicose veins of lower extremities, who presents with c/o left breast cancer. Pt is scheduled for bilateral mastectomies, left axillary sentinel node biopsy on 4/4/2022. This is a 62 year old Russian female with PMH of asthma, prediabetes, right breast cancer treated with chemotherapy, radiation therapy, and lumpectomy, ELVIN-does not use CPAP due to discomfort, HTN, HLD, GERD, OA, varicose veins of lower extremities, who presents with c/o left breast cancer. Pt is scheduled for bilateral mastectomies, left axillary sentinel node biopsy on 4/4/2022, surgery was cancelled 4/4/22 because pt stated " had a cold" , surgery now rescheduled for  4/25/22    denies recent travel or covid infections  covid swab was done today at [place] with result pending    pt speaks Guinean translated by Jacqueline # [id number]

## 2022-04-21 NOTE — H&P PST ADULT - NEUROLOGICAL
Patient here today with father, physical for basketball. Alert & oriented; no sensory, motor or coordination deficits, normal reflexes negative detailed exam

## 2022-04-21 NOTE — H&P PST ADULT - PROBLEM SELECTOR PLAN 2
Last hemoglobin A1C unknown. Repeated during PST visit.  Perioperative glucose monitoring and coverage as needed. Follow-up with PCP for diabetic management. ELVIN precaution

## 2022-04-21 NOTE — H&P PST ADULT - ASSESSMENT
This is a 62 year old Russian female with PMH of asthma, prediabetes, right breast cancer treated with chemotherapy, radiation therapy, and lumpectomy, ELVIN-does not use CPAP due to discomfort, HTN, HLD, GERD, OA, varicose veins of lower extremities, who presents with left breast cancer. Pt is scheduled for bilateral mastectomies, left axillary sentinel node biopsy on 4/4/2022.  Pt diagnosed with ELVIN-does not use CPAP due to discomfort. Pt is to be evaluated for oral appliance. Perioperative pulmonary precautions to be maintained. This is a 62 year old Russian female with PMH of asthma, prediabetes, right breast cancer treated with chemotherapy, radiation therapy, and lumpectomy, ELVIN-does not use CPAP due to discomfort, HTN, HLD, GERD, OA, varicose veins of lower extremities, who presents with left breast cancer. Pt is scheduled for bilateral mastectomies, left axillary sentinel node biopsy on 4/25/22  Pt diagnosed with ELVIN-does not use CPAP due to discomfort. Pt is to be evaluated for oral appliance. Perioperative pulmonary precautions to be maintained.

## 2022-04-21 NOTE — H&P PST ADULT - PROBLEM SELECTOR PLAN 4
Pt diagnosed with ELVIN-does not use CPAP due to discomfort. Pt is to be evaluated for oral appliance. Perioperative pulmonary precautions to be maintained. continue meds

## 2022-04-21 NOTE — H&P PST ADULT - PROBLEM SELECTOR PLAN 3
Pt c/o feeling SOB with cold air. To be seen by pulmonologist before surgery. Instructed to continue use of inhaler and use it same on day of surgery. Instructed to bring inhalers to hospital on day of surgery. Follow-up with PCP postop for management continue med

## 2022-04-24 ENCOUNTER — TRANSCRIPTION ENCOUNTER (OUTPATIENT)
Age: 62
End: 2022-04-24

## 2022-04-25 ENCOUNTER — INPATIENT (INPATIENT)
Facility: HOSPITAL | Age: 62
LOS: 1 days | Discharge: HOME HEALTH | DRG: 581 | End: 2022-04-27
Attending: SPECIALIST | Admitting: SPECIALIST
Payer: MEDICAID

## 2022-04-25 ENCOUNTER — APPOINTMENT (OUTPATIENT)
Dept: SURGICAL ONCOLOGY | Facility: HOSPITAL | Age: 62
End: 2022-04-25

## 2022-04-25 ENCOUNTER — APPOINTMENT (OUTPATIENT)
Age: 62
End: 2022-04-25

## 2022-04-25 ENCOUNTER — TRANSCRIPTION ENCOUNTER (OUTPATIENT)
Age: 62
End: 2022-04-25

## 2022-04-25 ENCOUNTER — RESULT REVIEW (OUTPATIENT)
Age: 62
End: 2022-04-25

## 2022-04-25 VITALS
RESPIRATION RATE: 16 BRPM | SYSTOLIC BLOOD PRESSURE: 124 MMHG | HEIGHT: 62 IN | WEIGHT: 147.93 LBS | HEART RATE: 71 BPM | OXYGEN SATURATION: 100 % | TEMPERATURE: 98 F | DIASTOLIC BLOOD PRESSURE: 73 MMHG

## 2022-04-25 DIAGNOSIS — Z90.710 ACQUIRED ABSENCE OF BOTH CERVIX AND UTERUS: Chronic | ICD-10-CM

## 2022-04-25 DIAGNOSIS — C50.912 MALIGNANT NEOPLASM OF UNSPECIFIED SITE OF LEFT FEMALE BREAST: ICD-10-CM

## 2022-04-25 DIAGNOSIS — Z98.890 OTHER SPECIFIED POSTPROCEDURAL STATES: Chronic | ICD-10-CM

## 2022-04-25 LAB — BLD GP AB SCN SERPL QL: SIGNIFICANT CHANGE UP

## 2022-04-25 PROCEDURE — 38308 INCISION OF LYMPH CHANNELS: CPT

## 2022-04-25 PROCEDURE — 19307 MAST MOD RAD: CPT | Mod: 50

## 2022-04-25 PROCEDURE — 19307 MAST MOD RAD: CPT | Mod: 82,50

## 2022-04-25 PROCEDURE — 38900 IO MAP OF SENT LYMPH NODE: CPT

## 2022-04-25 PROCEDURE — 88342 IMHCHEM/IMCYTCHM 1ST ANTB: CPT | Mod: 26

## 2022-04-25 PROCEDURE — 38792 RA TRACER ID OF SENTINL NODE: CPT | Mod: 59

## 2022-04-25 PROCEDURE — 88307 TISSUE EXAM BY PATHOLOGIST: CPT | Mod: 26

## 2022-04-25 PROCEDURE — 88305 TISSUE EXAM BY PATHOLOGIST: CPT | Mod: 26

## 2022-04-25 PROCEDURE — 88333 PATH CONSLTJ SURG CYTO XM 1: CPT | Mod: 26

## 2022-04-25 PROCEDURE — 78195 LYMPH SYSTEM IMAGING: CPT | Mod: 26

## 2022-04-25 PROCEDURE — 88341 IMHCHEM/IMCYTCHM EA ADD ANTB: CPT | Mod: 26

## 2022-04-25 DEVICE — CLIP APPLIER COVIDIEN SURGICLIP II 9.75" MEDIUM: Type: IMPLANTABLE DEVICE | Site: RIGHT,   LEFT | Status: FUNCTIONAL

## 2022-04-25 RX ORDER — ACETAMINOPHEN 500 MG
1000 TABLET ORAL EVERY 6 HOURS
Refills: 0 | Status: COMPLETED | OUTPATIENT
Start: 2022-04-25 | End: 2022-04-25

## 2022-04-25 RX ORDER — MORPHINE SULFATE 50 MG/1
4 CAPSULE, EXTENDED RELEASE ORAL EVERY 4 HOURS
Refills: 0 | Status: DISCONTINUED | OUTPATIENT
Start: 2022-04-25 | End: 2022-04-27

## 2022-04-25 RX ORDER — FAMOTIDINE 10 MG/ML
20 INJECTION INTRAVENOUS EVERY 12 HOURS
Refills: 0 | Status: DISCONTINUED | OUTPATIENT
Start: 2022-04-25 | End: 2022-04-27

## 2022-04-25 RX ORDER — ACETAMINOPHEN 500 MG
1000 TABLET ORAL EVERY 6 HOURS
Refills: 0 | Status: COMPLETED | OUTPATIENT
Start: 2022-04-26 | End: 2022-04-26

## 2022-04-25 RX ORDER — HYDROMORPHONE HYDROCHLORIDE 2 MG/ML
0.5 INJECTION INTRAMUSCULAR; INTRAVENOUS; SUBCUTANEOUS
Refills: 0 | Status: DISCONTINUED | OUTPATIENT
Start: 2022-04-25 | End: 2022-04-25

## 2022-04-25 RX ORDER — SODIUM CHLORIDE 9 MG/ML
3 INJECTION INTRAMUSCULAR; INTRAVENOUS; SUBCUTANEOUS EVERY 8 HOURS
Refills: 0 | Status: DISCONTINUED | OUTPATIENT
Start: 2022-04-25 | End: 2022-04-25

## 2022-04-25 RX ORDER — FENTANYL CITRATE 50 UG/ML
25 INJECTION INTRAVENOUS
Refills: 0 | Status: DISCONTINUED | OUTPATIENT
Start: 2022-04-25 | End: 2022-04-25

## 2022-04-25 RX ORDER — OXYCODONE HYDROCHLORIDE 5 MG/1
5 TABLET ORAL EVERY 6 HOURS
Refills: 0 | Status: DISCONTINUED | OUTPATIENT
Start: 2022-04-25 | End: 2022-04-27

## 2022-04-25 RX ORDER — ONDANSETRON 8 MG/1
4 TABLET, FILM COATED ORAL EVERY 6 HOURS
Refills: 0 | Status: DISCONTINUED | OUTPATIENT
Start: 2022-04-25 | End: 2022-04-27

## 2022-04-25 RX ORDER — CHLORHEXIDINE GLUCONATE 213 G/1000ML
1 SOLUTION TOPICAL ONCE
Refills: 0 | Status: COMPLETED | OUTPATIENT
Start: 2022-04-25 | End: 2022-04-25

## 2022-04-25 RX ADMIN — HYDROMORPHONE HYDROCHLORIDE 0.5 MILLIGRAM(S): 2 INJECTION INTRAMUSCULAR; INTRAVENOUS; SUBCUTANEOUS at 13:14

## 2022-04-25 RX ADMIN — Medication 400 MILLIGRAM(S): at 15:24

## 2022-04-25 RX ADMIN — SODIUM CHLORIDE 3 MILLILITER(S): 9 INJECTION INTRAMUSCULAR; INTRAVENOUS; SUBCUTANEOUS at 07:54

## 2022-04-25 RX ADMIN — Medication 1000 MILLIGRAM(S): at 16:05

## 2022-04-25 RX ADMIN — HYDROMORPHONE HYDROCHLORIDE 0.5 MILLIGRAM(S): 2 INJECTION INTRAMUSCULAR; INTRAVENOUS; SUBCUTANEOUS at 14:25

## 2022-04-25 RX ADMIN — CHLORHEXIDINE GLUCONATE 1 APPLICATION(S): 213 SOLUTION TOPICAL at 07:56

## 2022-04-25 RX ADMIN — MORPHINE SULFATE 4 MILLIGRAM(S): 50 CAPSULE, EXTENDED RELEASE ORAL at 20:32

## 2022-04-25 RX ADMIN — MORPHINE SULFATE 4 MILLIGRAM(S): 50 CAPSULE, EXTENDED RELEASE ORAL at 18:53

## 2022-04-25 RX ADMIN — HYDROMORPHONE HYDROCHLORIDE 0.5 MILLIGRAM(S): 2 INJECTION INTRAMUSCULAR; INTRAVENOUS; SUBCUTANEOUS at 13:47

## 2022-04-25 RX ADMIN — HYDROMORPHONE HYDROCHLORIDE 0.5 MILLIGRAM(S): 2 INJECTION INTRAMUSCULAR; INTRAVENOUS; SUBCUTANEOUS at 13:42

## 2022-04-25 RX ADMIN — Medication 400 MILLIGRAM(S): at 21:44

## 2022-04-25 RX ADMIN — Medication 1000 MILLIGRAM(S): at 22:02

## 2022-04-25 NOTE — ASU PREOP CHECKLIST - TEMPERATURE IN FAHRENHEIT (DEGREES F)
98.2

## 2022-04-25 NOTE — PATIENT PROFILE ADULT - FALL HARM RISK - UNIVERSAL INTERVENTIONS
Bed in lowest position, wheels locked, appropriate side rails in place/Call bell, personal items and telephone in reach/Instruct patient to call for assistance before getting out of bed or chair/Non-slip footwear when patient is out of bed/[place] to call system/Physically safe environment - no spills, clutter or unnecessary equipment/Purposeful Proactive Rounding/Room/bathroom lighting operational, light cord in reach

## 2022-04-25 NOTE — PATIENT PROFILE ADULT - LANGUAGE ASSISTANCE NEEDED
pt. requested to speak in Uzbek (RNs native language)/No-Patient/Caregiver offered and refused free interpretation services.

## 2022-04-25 NOTE — BRIEF OPERATIVE NOTE - NSICDXBRIEFPREOP_GEN_ALL_CORE_FT
PRE-OP DIAGNOSIS:  Malignant neoplasm of unspecified site of left female breast 25-Apr-2022 12:30:51  Bharath Calabrese

## 2022-04-25 NOTE — ASU PREOP CHECKLIST - PATIENT PROBLEMS/NEEDS
Date Performed: 08/14/2019    PREOPERATIVE ADMISSION HISTORY AND PHYSICAL    REQUESTING PHYSICIAN:   Dr. Onur Howard    Date of anticipated admission and surgery at [facility][note 3] is 08/19/2019.    CHIEF COMPLAINT:  This is 1 of 2 recent hospital admissions for this 57-year-old female admitted to the hospital for left carotid endarterectomy to be done by Dr. Howard under general anesthesia.    HISTORY OF PRESENT ILLNESS:  The patient is a pleasant and generally healthy 57-year-old who was hospitalized at [facility] on 07/15/2019 with a presumed acute embolic cerebrovascular accident.  She developed the abrupt onset of some confusion at work.  She turned on her computer, but could not further process her work.  She could not remember her user name or password and realized something was wrong.  She was taken to the emergency room where CT scan suggested an acute ischemic infarct.  She underwent CT angiogram on 07/15/2019 which showed at least an 80% stenosis of the proximal left internal carotid artery due to a large amount of predominantly noncalcified atherosclerotic plaque.  The plaque was felt to be irregular and somewhat friable.  She also underwent MRI of the brain without contrast which demonstrated findings suggesting an acute multifocal embolic cerebrovascular accident involving the left middle cerebral artery.    The patient was felt not to be a TPA candidate and IV Heparin was initiated along with Aspirin, Plavix and Atorvastatin.  The patient was admitted and monitored carefully.  Interestingly, her symptoms largely resolved in less than 24 hours and she was continued on Plavix.  Also, started on a Baby Aspirin.  She was discharged on 07/17/2019 and saw Dr. Howard on 07/18/2019.  It was recommended that she undergo the aforementioned surgical procedure.    It was found while hospitalized that she had significant elevation in blood sugars and was diagnosed as  having type 2 diabetes mellitus, which was a new diagnosis for her.  She was started on Insulin therapy in the hospital and discharged home.  Since that time she has been followed closely by myself as well as Dr. Howard and is now admitted for the aforementioned surgery.    PAST MEDICAL HISTORY:  Otherwise relatively insignificant.  She has not been one to engage in health care over the years and it has been many years since I had seen her.  She has never had surgery before.    CURRENT MEDICATIONS:  Aspirin 81 mg a day.  Plavix 75 mg daily.  Atorvastatin 80 mg at night.    Her current dose of Insulin includes Levemir taken 15 units subcutaneously at bedtime.    She is on Lispro Insulin taken 8 units 15 minutes prior to breakfast, lunch and supper.  She also is on a nicotine patch 14 mg per 24 hours 1 patch daily.    ALLERGIES:  The patient has no known drug allergies.    IMMUNIZATIONS:  Not up-to-date and will be updated once her surgery is completed.    FAMILY HISTORY:  Noncontributory as it relates to the history of present illness.    SOCIAL HISTORY:  The patient is happily  to her , Clay, for many years.  She has smoked a pack of cigarettes a day at least since her teenage years until quitting on 07/15/2019 following her CVA/TIA.  She drinks alcohol socially.    REVIEW OF SYSTEMS:    GENERAL:  No fever, weight loss, weight gain, fatigue or change in appetite.  EENT:  No visual changes, no eye pain.  No hearing loss, sore throat or hoarseness.  HEART:  No chest pain or palpitations.  PULMONARY:  No shortness of breath, cough, or wheezing.  GASTROINTESTINAL:  No heartburn, vomiting, diarrhea, constipation, blood in the stool, or change in bowel habits.  URINARY:  No frequency, urgency, dysuria, incontinence, or nocturia.  SKIN:  No rash, non-healing lesions or itching.  NEUROLOGIC:  No depression, anxiety, insomnia, or mood changes.  Positives include some memory issues, although she states this  was going on before the aforementioned CVA/TIA.  She also has had some intermittent left foot numbness and tingling, however, this also preceded the event.  She does describe an episode of speech problems which occurred 11 years ago and wonders if this was a possible TIA as well.  ENDOCRINE:  No polyuria, polydipsia, heat intolerance, or cold intolerance.  HEMATOLOGIC:  No anemia or night sweats.  ALLERGIC:  No hay fevers, hives, or asthma.  MUSCULOSKELETAL:  No back pain or arthritis.  GENITAL:  No vaginal discharge, pelvic pain, or spotting.    PHYSICAL EXAMINATION:  GENERAL:  The patient is alert, cooperative, no distress.  VITAL SIGNS:  Blood pressure 118/58, pulse 53 regular, respirations 16, temperature 97.3.  Patient is 5 feet 7 inches tall, weighs 69.9 kilograms.  Body mass index 24.12.  SKIN:  Without rash, ecchymosis or petechiae.  LYMPH NODES:  No palpable cervical, clavicular, axillary or inguinal adenopathy.  HEENT:  Head is normocephalic without trauma.  Eyes:  Pupils are midposition, equally round, reactive to light and accommodation.  Extraocular movements are full.  Funduscopic exam is negative.  Ears:  TMs are clear.  Nose:  No septal deviation or purulent discharge.  Throat:  The posterior oropharynx is clear, without hemorrhage or exudate.  NECK:  Supple, without mass, thyromegaly, or obvious bruit.  LUNGS:  Chest fields are clear.  There are no rales, rhonchi, rubs or wheezes.  CARDIAC:  Regular rhythm without murmur, gallop or rub.  ABDOMEN:  Soft, no masses, tenderness or hepatosplenomegaly.  BACK:  Without CVA or spinal tenderness.  EXTREMITIES:  No edema, cyanosis, pallor or clubbing.  NEUROLOGIC:  Without focal or lateralizing deficits.  I can detect no focal neurologic deficit.  VASCULAR:  I can detect no carotid bruits.  Interestingly, there is some diminished pulses of the left lower extremity compared to the right, dorsalis pedis and posterior tibial are minimally palpable on the left  compared to the right.      Previsit labs show normal CBC including white blood count.  Hemoglobin, hematocrit and platelet count.  There is also a normal white blood count differential.  Blood type is A positive.  Her electrolytes are normal.  Blood sugar 138, BUN 22, creatinine 0.61.  Liver function normal.  Her previous hemoglobin A1c obtained in the hospital was greater than 11.  She was on no treatment and did not know she had diabetes at that time.    MEDICAL DECISION MAKING:  A 57-year-old female who suffered an acute embolic event occurring on 07/15/2019.  She was treated aggressively with IV Heparin, Plavix and Aspirin and her symptoms largely cleared within hours.  She was also found to have new onset type 2 diabetes mellitus.  She also has a long history of cigarette smoking.  Her CT angiogram showed an 80% lesion of the proximal internal carotid artery on the left, which appeared to show soft plaque.  She is now admitted for left carotid endarterectomy to be done by Dr. Howard under general anesthesia.    PLAN:  I carefully discussed with the patient her meds and the management of meds in the perioperative period.  She knows to not take her Aspirin or Plavix the morning of surgery.  She will not have her patch on that day as well concerning her nicotine.  In terms of her Levemir Insulin, I asked her, since she takes her Insulin at bedtime to take only 8 units of Levemir on the night before her surgery rather than the 15.  She will not take any short-acting Insulin on the morning of her surgery.  Will follow up postoperatively with Dr. Howard.      Dictated By: Magdiel Eubanks MD  Signing Provider: MD ROJAS Henson/ps ([id number])  DD: 08/14/2019 17:17:17 TD: 08/14/2019 17:38:20    Copy Sent To:     cc: Onur Howard MD   Patient expressed no known problems or needs

## 2022-04-25 NOTE — BRIEF OPERATIVE NOTE - NSICDXBRIEFPROCEDURE_GEN_ALL_CORE_FT
PROCEDURES:  Bilateral simple mastectomies 25-Apr-2022 12:28:51  Bharath Calabrese  Biopsy of left axillary sentinel nodes 25-Apr-2022 12:29:10  Bharath Calabrese

## 2022-04-25 NOTE — BRIEF OPERATIVE NOTE - NSICDXBRIEFPOSTOP_GEN_ALL_CORE_FT
POST-OP DIAGNOSIS:  Malignant neoplasm of unspecified site of left female breast 25-Apr-2022 12:31:05  Bharath Calabrese

## 2022-04-26 ENCOUNTER — TRANSCRIPTION ENCOUNTER (OUTPATIENT)
Age: 62
End: 2022-04-26

## 2022-04-26 LAB
ANION GAP SERPL CALC-SCNC: 5 MMOL/L — SIGNIFICANT CHANGE UP (ref 5–17)
BASOPHILS # BLD AUTO: 0.01 K/UL — SIGNIFICANT CHANGE UP (ref 0–0.2)
BASOPHILS NFR BLD AUTO: 0.2 % — SIGNIFICANT CHANGE UP (ref 0–2)
BUN SERPL-MCNC: 10 MG/DL — SIGNIFICANT CHANGE UP (ref 7–18)
CALCIUM SERPL-MCNC: 8.6 MG/DL — SIGNIFICANT CHANGE UP (ref 8.4–10.5)
CHLORIDE SERPL-SCNC: 98 MMOL/L — SIGNIFICANT CHANGE UP (ref 96–108)
CO2 SERPL-SCNC: 28 MMOL/L — SIGNIFICANT CHANGE UP (ref 22–31)
CREAT SERPL-MCNC: 0.46 MG/DL — LOW (ref 0.5–1.3)
EGFR: 108 ML/MIN/1.73M2 — SIGNIFICANT CHANGE UP
EOSINOPHIL # BLD AUTO: 0.02 K/UL — SIGNIFICANT CHANGE UP (ref 0–0.5)
EOSINOPHIL NFR BLD AUTO: 0.3 % — SIGNIFICANT CHANGE UP (ref 0–6)
GLUCOSE SERPL-MCNC: 111 MG/DL — HIGH (ref 70–99)
HCT VFR BLD CALC: 33.1 % — LOW (ref 34.5–45)
HGB BLD-MCNC: 10.9 G/DL — LOW (ref 11.5–15.5)
IMM GRANULOCYTES NFR BLD AUTO: 0.2 % — SIGNIFICANT CHANGE UP (ref 0–1.5)
LYMPHOCYTES # BLD AUTO: 1.19 K/UL — SIGNIFICANT CHANGE UP (ref 1–3.3)
LYMPHOCYTES # BLD AUTO: 18 % — SIGNIFICANT CHANGE UP (ref 13–44)
MCHC RBC-ENTMCNC: 28.8 PG — SIGNIFICANT CHANGE UP (ref 27–34)
MCHC RBC-ENTMCNC: 32.9 GM/DL — SIGNIFICANT CHANGE UP (ref 32–36)
MCV RBC AUTO: 87.3 FL — SIGNIFICANT CHANGE UP (ref 80–100)
MONOCYTES # BLD AUTO: 0.65 K/UL — SIGNIFICANT CHANGE UP (ref 0–0.9)
MONOCYTES NFR BLD AUTO: 9.8 % — SIGNIFICANT CHANGE UP (ref 2–14)
NEUTROPHILS # BLD AUTO: 4.74 K/UL — SIGNIFICANT CHANGE UP (ref 1.8–7.4)
NEUTROPHILS NFR BLD AUTO: 71.5 % — SIGNIFICANT CHANGE UP (ref 43–77)
NRBC # BLD: 0 /100 WBCS — SIGNIFICANT CHANGE UP (ref 0–0)
PLATELET # BLD AUTO: 243 K/UL — SIGNIFICANT CHANGE UP (ref 150–400)
POTASSIUM SERPL-MCNC: 4 MMOL/L — SIGNIFICANT CHANGE UP (ref 3.5–5.3)
POTASSIUM SERPL-SCNC: 4 MMOL/L — SIGNIFICANT CHANGE UP (ref 3.5–5.3)
RBC # BLD: 3.79 M/UL — LOW (ref 3.8–5.2)
RBC # FLD: 13.2 % — SIGNIFICANT CHANGE UP (ref 10.3–14.5)
SODIUM SERPL-SCNC: 131 MMOL/L — LOW (ref 135–145)
WBC # BLD: 6.62 K/UL — SIGNIFICANT CHANGE UP (ref 3.8–10.5)
WBC # FLD AUTO: 6.62 K/UL — SIGNIFICANT CHANGE UP (ref 3.8–10.5)

## 2022-04-26 RX ORDER — ACETAMINOPHEN 500 MG
650 TABLET ORAL EVERY 6 HOURS
Refills: 0 | Status: DISCONTINUED | OUTPATIENT
Start: 2022-04-26 | End: 2022-04-27

## 2022-04-26 RX ADMIN — ONDANSETRON 4 MILLIGRAM(S): 8 TABLET, FILM COATED ORAL at 15:18

## 2022-04-26 RX ADMIN — SODIUM CHLORIDE 3 MILLILITER(S): 9 INJECTION INTRAMUSCULAR; INTRAVENOUS; SUBCUTANEOUS at 13:23

## 2022-04-26 RX ADMIN — Medication 400 MILLIGRAM(S): at 02:07

## 2022-04-26 RX ADMIN — SODIUM CHLORIDE 3 MILLILITER(S): 9 INJECTION INTRAMUSCULAR; INTRAVENOUS; SUBCUTANEOUS at 05:48

## 2022-04-26 RX ADMIN — ONDANSETRON 4 MILLIGRAM(S): 8 TABLET, FILM COATED ORAL at 10:14

## 2022-04-26 RX ADMIN — SODIUM CHLORIDE 3 MILLILITER(S): 9 INJECTION INTRAMUSCULAR; INTRAVENOUS; SUBCUTANEOUS at 22:58

## 2022-04-26 RX ADMIN — Medication 400 MILLIGRAM(S): at 08:53

## 2022-04-26 RX ADMIN — Medication 650 MILLIGRAM(S): at 17:43

## 2022-04-26 RX ADMIN — Medication 1000 MILLIGRAM(S): at 02:15

## 2022-04-26 RX ADMIN — Medication 1000 MILLIGRAM(S): at 09:53

## 2022-04-26 NOTE — DISCHARGE NOTE PROVIDER - NSDCMRMEDTOKEN_GEN_ALL_CORE_FT
Dexilant 30 mg oral delayed release capsule: 1 cap(s) orally once a day  Livalo 1 mg oral tablet: 1 tab(s) orally once a day  omeprazole 40 mg oral delayed release capsule: 1 cap(s) orally once a day  oxycodone-acetaminophen 5 mg-325 mg oral tablet: 1 tab(s) orally every 6 hours, As Needed -for severe pain MDD:4  Roxicet 5 mg-325 mg oral tablet: 1 tab(s) orally every 6 hours, As Needed  spironolactone 25 mg oral tablet: 0.5 tab(s) orally once a day  Tylenol 500 mg oral tablet: 2 tab(s) orally every 6 hours, As Needed  Vitamin D3 25 mcg (1000 intl units) oral capsule: 1 cap(s) orally once a day

## 2022-04-26 NOTE — DISCHARGE NOTE PROVIDER - NSDCCPCAREPLAN_GEN_ALL_CORE_FT
PRINCIPAL DISCHARGE DIAGNOSIS  Diagnosis: Breast cancer, left  Assessment and Plan of Treatment:

## 2022-04-26 NOTE — DISCHARGE NOTE NURSING/CASE MANAGEMENT/SOCIAL WORK - NSDCPEFALRISK_GEN_ALL_CORE
For information on Fall & Injury Prevention, visit: [email or web address] OR  [email or web address] OR  https://www.cdc.gov/steadi/patient.html

## 2022-04-26 NOTE — DISCHARGE NOTE PROVIDER - HOSPITAL COURSE
Patient admitted post-op for drain monitoring and pain control. Post-operative period uneventful, remained hemodynamically stable and afebrile. Pain well controlled, tolerating regular diet. Hemoglobin remained stable. Patient discharged home.

## 2022-04-26 NOTE — DISCHARGE NOTE PROVIDER - NSDCFUADDINST_GEN_ALL_CORE_FT
Percocet 5/325mg 1 tab every 6 hours as needed for pain - Do NOT take Tylenol at the same time as it can lead to overdose and liver damage.    Once Percocet runs out, you may switch to:  Alternate Tylenol 650mg every 6 hours and Motrin 600mg every 6 hours for mild to moderate pain.    Avoid lifting heavy weights and raising your arms above your head.  Monitor drain output and maintain a log, and bring log to follow-up appointment.    You may remove the clear dressing and gauze on Wednesday afternoon, but leave the paper strips in place as they will fall off on their own. You may begin showering at this time. Allow warm soapy water to run over wound, and pat dry. Do not rub wound, and no soaking.    Follow-up in clinic in one week.

## 2022-04-26 NOTE — DISCHARGE NOTE PROVIDER - NSDCFUSCHEDAPPT_GEN_ALL_CORE_FT
ARI LOYA ; 05/27/2022 ; [facility] [address] [facility] ARI LOYA ; 05/16/2022 ; NPP Med Pulm [address] [facility]  ARI LOYA ; 05/27/2022 ; NPSCOOTER [facility] [address] [facility]

## 2022-04-26 NOTE — DISCHARGE NOTE PROVIDER - CARE PROVIDER_API CALL
Nick Leon)  Surgery  [address] [place] [address], [place] [address]  Phone: [phone]  Fax: [phone]  Established Patient  Follow Up Time: 1 week

## 2022-04-26 NOTE — DISCHARGE NOTE NURSING/CASE MANAGEMENT/SOCIAL WORK - PATIENT PORTAL LINK FT
You can access the FollowMyHealth Patient Portal offered by [facility] by registering at the following website: [email or web address]. By joining [employer]’s FollowMyHealth portal, you will also be able to view your health information using other applications (apps) compatible with our system.

## 2022-04-26 NOTE — DISCHARGE NOTE PROVIDER - NSDCCPTREATMENT_GEN_ALL_CORE_FT
PRINCIPAL PROCEDURE  Procedure: Bilateral simple mastectomies  Findings and Treatment:       SECONDARY PROCEDURE  Procedure: Biopsy of left axillary sentinel nodes  Findings and Treatment:

## 2022-04-27 VITALS
SYSTOLIC BLOOD PRESSURE: 175 MMHG | TEMPERATURE: 98 F | OXYGEN SATURATION: 100 % | DIASTOLIC BLOOD PRESSURE: 90 MMHG | RESPIRATION RATE: 18 BRPM | HEART RATE: 80 BPM

## 2022-04-27 RX ORDER — SENNA PLUS 8.6 MG/1
1 TABLET ORAL ONCE
Refills: 0 | Status: COMPLETED | OUTPATIENT
Start: 2022-04-27 | End: 2022-04-27

## 2022-04-27 RX ADMIN — SENNA PLUS 1 TABLET(S): 8.6 TABLET ORAL at 11:38

## 2022-04-27 RX ADMIN — Medication 650 MILLIGRAM(S): at 15:53

## 2022-04-27 RX ADMIN — SODIUM CHLORIDE 3 MILLILITER(S): 9 INJECTION INTRAMUSCULAR; INTRAVENOUS; SUBCUTANEOUS at 05:12

## 2022-04-27 RX ADMIN — Medication 650 MILLIGRAM(S): at 11:39

## 2022-04-27 NOTE — PROGRESS NOTE ADULT - ASSESSMENT
62F POD#1 s/p bilateral mastectomy with left sentinel lymph node, doing well    -Pain control  -incentive spirometry, encourage ambulation  -monitor drain output  -Regular diet  -Dispo planning  
62F POD#0 s/p bilateral mastectomy with left sential lymph node  -Pain control  -incentive spirometry  -monitor drain output  -ok for diet  -ambulate as tolerated, OOBTC  
62F POD#2 s/p bilateral mastectomy with left sentinel lymph node, doing well    -Pain control  -incentive spirometry, encourage ambulation  -monitor drain output  -Regular diet  -F/U AM labs  -Dispo planning

## 2022-04-27 NOTE — PROGRESS NOTE ADULT - SUBJECTIVE AND OBJECTIVE BOX
Patient seen and examined at the bedside.  POD#2 s/p bilateral mastectomy with left sentinel lymph node sample.  Patient remains afebrile, hemodynamically stable.  Pain is well controlled.  Patient able to tolerating small amounts of regular food as she still lacks a full appetite, but denies vomiting.  Patient voiding without discomfort, and passing flatus.  Endorses ambulation, states her dizziness improved from yesterday, and feels ready to go home. Denies abdominal pain, nausea, vomiting, fever, chills.      Vital Signs Last 24 Hrs  T(C): 36.4 (27 Apr 2022 05:10), Max: 36.9 (26 Apr 2022 14:12)  T(F): 97.5 (27 Apr 2022 05:10), Max: 98.5 (26 Apr 2022 14:12)  HR: 67 (27 Apr 2022 05:10) (64 - 67)  BP: 144/68 (27 Apr 2022 05:10) (141/64 - 166/68)  BP(mean): --  RR: 18 (27 Apr 2022 05:10) (18 - 18)  SpO2: 99% (27 Apr 2022 05:10) (99% - 100%)    physical:  GA: AAOx3, no acute distress  Chest: Bilateral drains in place, minimal sanguinous output at this time. no signs of active bleeding or saturation of dressings. Pressure dressing in place, no signs of active bleeding, hematoma, or erythema. Area appropriately TTP  CVS: RRR. no murmurs, rubs or gallops  Pulm: Normal inspiratory effort  Abd: soft, nontender, nondistended.    I&O's Detail    26 Apr 2022 07:01  -  27 Apr 2022 07:00  --------------------------------------------------------  IN:  Total IN: 0 mL    OUT:    Bulb (mL): 25 mL    Bulb (mL): 180 mL    Bulb (mL): 120 mL    Bulb (mL): 45 mL  Total OUT: 370 mL    Total NET: -370 mL      
Vital Signs Last 24 Hrs  T(C): 36.8 (26 Apr 2022 06:25), Max: 36.9 (26 Apr 2022 00:53)  T(F): 98.2 (26 Apr 2022 06:25), Max: 98.4 (26 Apr 2022 00:53)  HR: 64 (26 Apr 2022 06:25) (64 - 87)  BP: 162/70 (26 Apr 2022 06:25) (142/69 - 170/65)  BP(mean): 89 (25 Apr 2022 14:30) (85 - 102)  RR: 18 (26 Apr 2022 06:25) (11 - 18)  SpO2: 100% (26 Apr 2022 06:25) (97% - 100%)    I&O's Detail    25 Apr 2022 07:01  -  26 Apr 2022 07:00  --------------------------------------------------------  IN:  Total IN: 0 mL    OUT:    Bulb (mL): 100 mL    Bulb (mL): 85 mL    Bulb (mL): 135 mL    Bulb (mL): 45 mL  Total OUT: 365 mL    Total NET: -365 mL      26 Apr 2022 07:01  -  26 Apr 2022 08:36  --------------------------------------------------------  IN:  Total IN: 0 mL    OUT:    Bulb (mL): 75 mL  Total OUT: 75 mL    Total NET: -75 mL                                10.9   6.62  )-----------( 243      ( 26 Apr 2022 07:25 )             33.1       04-26    131<L>  |  98  |  10  ----------------------------<  111<H>  4.0   |  28  |  0.46<L>    Ca    8.6      26 Apr 2022 07:25    Flaps viable  j-P's sero-sanguinous            PLAN:  discharge home today  RTO one week for J-P removal  Instructions given        
Post-op Check:  Patient seen and examined at the bedside.  POD#0 s/p bilateral mastectomy with left sentinal lymph node sample.  Patient is afebrile, hemodynamically stable.  Pain is well controlled.  Patient has not eaten since procedure.  Patient endorses urination.  Denies nausea, vomiting, fever, chills.  Endorses ambulation.      Vital Signs Last 24 Hrs  T(C): 36.4 (25 Apr 2022 15:11), Max: 36.8 (25 Apr 2022 08:10)  T(F): 97.6 (25 Apr 2022 15:11), Max: 98.2 (25 Apr 2022 08:10)  HR: 73 (25 Apr 2022 15:11) (65 - 87)  BP: 170/65 (25 Apr 2022 15:11) (124/73 - 170/65)  BP(mean): 89 (25 Apr 2022 14:30) (85 - 102)  RR: 15 (25 Apr 2022 15:11) (11 - 17)  SpO2: 100% (25 Apr 2022 15:11) (97% - 100%)    physical:  GA: AAOx3, no acute distress  Chest: surgical bra in place, bilateral drains in place, minimal sanguinous output at this time. no signs of active bleeding or saturation of dressings.   CVS: RRR. no murmurs, rubs or gallops  Pulm: bilateral breath sounds present. normal inspiratory effort  Abd: soft, nontender, nondistended. 
Patient seen and examined at the bedside.  POD#1 s/p bilateral mastectomy with left sentinel lymph node sample.  Patient remains afebrile, hemodynamically stable.  Pain is well controlled.  Patient has not eaten, but tolerating liquids.  Patient voiding without discomfort.  Endorses ambulation and feels ready to go home. Denies abdominal pain, nausea, vomiting, fever, chills.      Vital Signs Last 24 Hrs  T(C): 36.8 (26 Apr 2022 06:25), Max: 36.9 (26 Apr 2022 00:53)  T(F): 98.2 (26 Apr 2022 06:25), Max: 98.4 (26 Apr 2022 00:53)  HR: 64 (26 Apr 2022 06:25) (64 - 87)  BP: 162/70 (26 Apr 2022 06:25) (124/73 - 170/65)  BP(mean): 89 (25 Apr 2022 14:30) (85 - 102)  RR: 18 (26 Apr 2022 06:25) (11 - 18)  SpO2: 100% (26 Apr 2022 06:25) (97% - 100%)    physical:  GA: AAOx3, no acute distress  Chest: Bilateral drains in place, minimal sanguinous output at this time. no signs of active bleeding or saturation of dressings. Bulky dressing removed, no signs of active bleeding, hematoma, or erythema. Area appropriately TTP  CVS: RRR. no murmurs, rubs or gallops  Pulm: Normal inspiratory effort  Abd: soft, nontender, nondistended.

## 2022-04-28 LAB — SURGICAL PATHOLOGY STUDY: SIGNIFICANT CHANGE UP

## 2022-05-01 ENCOUNTER — EMERGENCY (EMERGENCY)
Facility: HOSPITAL | Age: 62
LOS: 1 days | Discharge: HOME | End: 2022-05-01
Attending: EMERGENCY MEDICINE
Payer: MEDICAID

## 2022-05-01 VITALS
TEMPERATURE: 98 F | HEART RATE: 61 BPM | DIASTOLIC BLOOD PRESSURE: 66 MMHG | WEIGHT: 143.96 LBS | OXYGEN SATURATION: 100 % | SYSTOLIC BLOOD PRESSURE: 114 MMHG | RESPIRATION RATE: 18 BRPM | HEIGHT: 62 IN

## 2022-05-01 DIAGNOSIS — Z90.710 ACQUIRED ABSENCE OF BOTH CERVIX AND UTERUS: Chronic | ICD-10-CM

## 2022-05-01 DIAGNOSIS — Z98.890 OTHER SPECIFIED POSTPROCEDURAL STATES: Chronic | ICD-10-CM

## 2022-05-01 PROCEDURE — 99284 EMERGENCY DEPT VISIT MOD MDM: CPT

## 2022-05-01 PROCEDURE — 99282 EMERGENCY DEPT VISIT SF MDM: CPT

## 2022-05-01 NOTE — ED ADULT NURSE NOTE - NSICDXPASTMEDICALHX_GEN_ALL_CORE_FT
PAST MEDICAL HISTORY:  Breast cancer right    GERD (gastroesophageal reflux disease)     HLD (hyperlipidemia)     HTN (hypertension)     LEVIN (obstructive sleep apnea) not using CPAP    Osteoarthritis     Prediabetes     Varicose veins of both lower extremities

## 2022-05-01 NOTE — ED PROVIDER NOTE - OBJECTIVE STATEMENT
pt with complaint of left KRISTINA drain draining less and the gauze pad on the left with dried blood noted   s/p bilat masectomy two days ago  no fever no chills no yellow/purulent dc no increased pain

## 2022-05-01 NOTE — ED ADULT NURSE NOTE - CAS ELECT INFOMATION PROVIDED
MD Morelos seen, evaluated and provided DC instructions. No nursing intervention needed./DC instructions

## 2022-05-01 NOTE — ED PROVIDER NOTE - PATIENT PORTAL LINK FT
You can access the FollowMyHealth Patient Portal offered by [facility] by registering at the following website: [email or web address]. By joining [facility] [facility]’s FollowMyHealth portal, you will also be able to view your health information using other applications (apps) compatible with our system.

## 2022-05-01 NOTE — ED PROVIDER NOTE - NSFOLLOWUPINSTRUCTIONS_ED_ALL_ED_FT
Total or Modified Radical Mastectomy       A total mastectomy and a modified radical mastectomy are surgeries that are done as part of treatment for breast cancer. You will have one of those types of surgery. Both types involve removing a breast.  •In a total mastectomy (simple mastectomy), all breast tissue including the nipple will be removed.      •In a modified radical mastectomy, lymph nodes under the arm will be removed along with the breast and nipple. Some of the lining over the muscle tissues under the breast may also be removed.      These procedures may also be used to help prevent breast cancer. A preventive (prophylactic) mastectomy may be done if you are at an increased risk of breast cancer due to harmful changes (mutations) in certain genes (BRCA genes). In that case, the procedure involves removing both of your breasts. This can reduce your risk of developing breast cancer in the future.    For a transgender person, a total mastectomy may be done as part of a surgical transition from female to male.      Let your health care provider know about:    •Any allergies you have.      •All medicines you are taking, including vitamins, herbs, eye drops, creams, and over-the-counter medicines.      •Any problems you or family members have had with anesthetic medicines.      •Any blood disorders you have.      •Any surgeries you have had.      •Any medical conditions you have.      •Whether you are pregnant or may be pregnant.        What are the risks?    Generally, this is a safe procedure. However, problems may occur, including:  •Pain.      •Infection.      •Bleeding.      •Allergic reactions to medicines.      •Scar tissue.      •Chest numbness on the side of the surgery.      •Fluid buildup under the skin flaps where your breast was removed (seroma).      •Sensation of throbbing or tingling.      •Stress or sadness from losing your breast.      If you have the lymph nodes under your arm removed, you may have arm swelling, weakness, or numbness on the same side of your body as your surgery.      What happens before the procedure?      Staying hydrated      Follow instructions from your health care provider about hydration, which may include:  •Up to 2 hours before the procedure – you may continue to drink clear liquids, such as water, clear fruit juice, black coffee, and plain tea.      Eating and drinking restrictions     Follow instructions from your health care provider about eating and drinking, which may include:  •8 hours before the procedure – stop eating heavy meals or foods such as meat, fried foods, or fatty foods.      •6 hours before the procedure – stop eating light meals or foods, such as toast or cereal.      •6 hours before the procedure – stop drinking milk or drinks that contain milk.      •2 hours before the procedure – stop drinking clear liquids.      Medicines  •Ask your health care provider about:  •Changing or stopping your regular medicines. This is especially important if you are taking diabetes medicines or blood thinners.      •Taking medicines such as aspirin and ibuprofen. These medicines can thin your blood. Do not take these medicines unless your health care provider tells you to take them.      •Taking over-the-counter medicines, vitamins, herbs, and supplements.        •Your health care team may give you antibiotic medicine to help prevent infection.      General instructions    •You may be checked for extra fluid around your lymph nodes (lymphedema).      •Plan to have someone take you home from the hospital or clinic.      •Plan to have a responsible adult care for you for at least 24 hours after you leave the hospital or clinic. This is important.      •Ask your health care provider how your surgical site will be marked or identified.      •You may be asked to shower with a germ-killing soap.        What happens during the procedure?  •To lower your risk of infection:  •Your health care team will wash or sanitize their hands.      •Your skin will be washed with soap.        •An IV will be inserted into one of your veins.      •You will be given a medicine to make you fall asleep (general anesthetic).      •A wide incision will be made around your nipple. The skin and nipple inside the incision will be removed along with all breast tissue.    •If you are having a modified radical mastectomy:  •The lining over your chest muscles will be removed.      •The incision may be extended to reach the lymph nodes under your arm, or a second incision may be made.      •Lymph nodes will be removed.        •Breast tissue and lymph nodes that are removed will be sent to the lab for testing.      •You may have a drainage tube inserted into your incision to collect fluid that builds up after surgery. This tube will be connected to a suction bulb on the outside of your body to remove the fluid.      •Your incision or incisions will be closed with stitches (sutures).      •A bandage (dressing) will be placed over your breast area. If lymph nodes were removed, a dressing will also be placed under your arm.      The procedure may vary among health care providers and hospitals.      What happens after the procedure?    •Your blood pressure, heart rate, breathing rate, and blood oxygen level will be monitored until the medicines you were given have worn off.      •You will be given pain medicine as needed.      •You will be encouraged to get up and walk as soon as you can.      •Your IV can be removed when you are able to eat and drink.      •You may have a drainage tube in place for 2–3 days to prevent a collection of blood (hematoma) from developing in the breast area. You will be given instructions about caring for the drain before you go home.      •A pressure bandage may be applied for 1–2 days to prevent bleeding or swelling. Ask your health care provider how to care for your pressure bandage at home.        Summary    •In a total mastectomy (simple mastectomy), all breast tissue including the nipple will be removed. In a modified radical mastectomy, the lymph nodes under the arm will be removed along with the breast and nipple.      •Before the procedure, follow instructions from your health care provider about eating and drinking, and ask about changing or stopping your regular medicines.      •You will be given a medicine to make you fall asleep (general anesthetic) during the procedure.      This information is not intended to replace advice given to you by your health care provider. Make sure you discuss any questions you have with your health care provider.

## 2022-05-01 NOTE — ED PROVIDER NOTE - CLINICAL SUMMARY MEDICAL DECISION MAKING FREE TEXT BOX
pt s/p masecttomy bilat dajeaninether concerned about infection due to decrease drainage in joaquin drain.  examination normal for wound healing will follow up with dr. coreas and return if any s/s infectoin

## 2022-05-01 NOTE — ED PROVIDER NOTE - PROGRESS NOTE DETAILS
carmen spoke with dr. coreas on the phone  plan is to follow up on tuesday but can be seen monday if they want  also discussed strict instructions to return if any s/s infetion, fever, redness red streaks puruluant dc

## 2022-05-03 ENCOUNTER — APPOINTMENT (OUTPATIENT)
Dept: SURGICAL ONCOLOGY | Facility: CLINIC | Age: 62
End: 2022-05-03
Payer: MEDICAID

## 2022-05-03 PROCEDURE — 99024 POSTOP FOLLOW-UP VISIT: CPT

## 2022-05-03 NOTE — PHYSICAL EXAM
[Normal] : well developed, well nourished, in no acute distress [de-identified] : incisions healing well, no evidence of infection; left drains clotted and patient has a seroma; right drains removed today

## 2022-05-03 NOTE — REASON FOR VISIT
[Post-Op] : a post-op for [FreeTextEntry2] : s/p Bilateral mastectomies with left axillary sentinel node biopsy on 4/25/22

## 2022-05-03 NOTE — ASSESSMENT
[FreeTextEntry1] : IMP: \par She is s/p Bilateral mastectomies with left axillary sentinel node biopsy on 4/25/22.  Pathology revealed:\par 1) RIGHT Breast: focal ADH\par 2) LEFT Breast: Invasive poorly differentiated ductal carcinoma (1.8 cm), DCIS, 2 benign left axillary lymph nodes (one of which was a sentinel node), negative margins.  T1cN0, ER-[place]-HER2 equivocal (POSITIVE by in situ hybridization).\par \par PLAN:\par Follow up with Dr. Abernathy for adjuvant chemotherapy\par RTO one week for left drain removal\par

## 2022-05-03 NOTE — HISTORY OF PRESENT ILLNESS
[de-identified] : ARI LOYA  is a 62 year old female here for an initial postop visit.\par \par In , pt was diagnosed with T2N0M0 (2.2cm), stage IIA RIGHT breast triple negative invasive ductal carcinoma with Taylor score 8 to 9.  She underwent lumpectomy Dr. Keyon Amezquita and then received adjuvant chemotherapy with taxotere and cytoxan for 4 cycles.  She then received IMRT with 5000cGy in 25 fractions followed by boost to the cavity of 1000cGy in 5 fractions 11-11 with Dr. Veena Walls. \par \par Pt had benign right breast stereotactic biopsy for calcifications in 2012 and s/p benign left breast US guided core biopsy 2018 (4-5:00).\par \par In 2020, screening mammogram/US showed calcifications in left mid breast as well as scattered and occasionally loosely grouped calcifications of central breast, 6mos follow up recommended. \par \par On 3/18/2021, follow up left breast mammogram showed probably benign calcifications in anterior left breast without significant change since 2020. FU in 2021 for stability.  \par \par In 21, bilateral mammo/US showed left breast 2-3:00 4cmfn 1.4cm irregular mass suspicious of malignancy, US core biopsy recommended; grouped heterogenous calcifications at right breast 10:00 anterior depth also suspicious for malignancy, bx recommended, BIRADS4. \par \par Pt underwent left breast 3:00 4cmfn US guided biopsy on 22 which showed invasive poorly differentiated ductal carcinoma, taylor score 8/9 (3+3+2), invasive tumor at least 0.8cm, microcalcifications present, no LVI.  ER-/[place]-, HER2 equivocal pending  CISH. \par \par MRI breast 22: \par 1. left breast 3:00 axis biopsy proven cancer with associated 1.4 cm mass enhancement. \par 2. Additional adjacent contiguous large area of linear non mass enhancement seen extending from the biopsy site into retroareolar/central/posterior left breast, up to 9.4 cm in AP dimension. \par 3. No suspicious enhancement in the right breast \par 4. No lymphadenopathy \par (BIRADS 4, suspicious findings)\par \par CT C/A/P 2/15/22: Multiple mildly enlarged mediastinal and hilar nodes, increased in size compared to 7/10/2020. Underdistended urinary bladder with questionable wall thickening. \par \par Bone scan 2/15/22:  No scan evidence of osseous metastasis. Degenerative disease in the spine and major joints \par \par Left breast biopsy 3/1/22: Fibrocystic change including apocrine adenosis and blood clot and focal microcalcifications\par Right breast biopsy 3/1/22: Proliferative fibrocystic change including sclerosing adenosis with microcalcifications. \par \par PET/CT 3/2/22: \par 1. FDG avd focus in the left outer breast corresponds to the pt recent biopsy proven invasive poorly differentiated ductal carcinoma. A 4.4 x 2.3 cm left central breast hematoma with adjacent postprocedural changes as described above. \par 2. Indeterminate FDG avid left supraclavicular, mediastinal and hilar lymphadenopathy, new and/or enlarged since 7/10/20, The symmetric distribution of lymph nodes in the mediastinum and hilar regions suggests a benign etiology. Left supraclavicular lymph  node is accessible to an US guise percutaneous needle biopsy\par 3. A subcentimeter, minimally FDG avid left axillary lymph node is nonspecific. \par \par Left supraclavicular lymph node FNA 3/15/22: Negative for malignant cells\par \par 3/24/2022: MRI show an extensive area of enhancement area the biopsy site, CT (CAP) shows mediastinal adenopathy. Subsequent biopsies of the breast and the supraclavicular lymph node were negative. BRCA: no significant mutation. Plan for bilateral mastectomies & axillary sentinel node biopsy\par \par h/o arthritis, on duloxetine. \par \par Risk factors: Prior breast disease: as above.  Menarche: 14. Postmenopausal,  after hysterectomy.  4 pregnancies, 5 miscarriages, 4 living children. \par Age of first pregnancy 18.   Breast feedin year each child. Oral contraceptive use: yes, 5 years. Other hormone exposure: None. \par Sister leukemia dx'd age 40. Mother had multiple myeloma.  Brother stomach cancer age 68.  Daughter with CLL age 30.  Paternal 2nd cousin breast cancer age 49. Paternal 2nd cousin liver cancer age 40 had hepatitis. Family history is negative for cancer of the ovary, endometrium, prostate, pancreatic and melanoma. \par The patient is not of Ashkenazi ethnic background. Never had genetic testing.  \par \par HEALTH MAINTENANCE \par PCP Dr. Maty Nunez\par GI Dr. Guo, colonoscopy 5 years, no polyps, due now\par GYN no pap smears, s/p MARK, no vaginal bleeding or spotting\par DERM Dr. Richards and Dr. Ramsey at [place]; no hx of skin cancer\par s/p COVID vaccine Pfizer\par \par INTERVAL HISTORY:\par \par She is s/p Bilateral mastectomies with left axillary sentinel node biopsy on 22.  Pathology revealed:\par 1) RIGHT Breast: focal ADH\par 2) LEFT Breast: Invasive poorly differentiated ductal carcinoma (1.8 cm), DCIS, 2 benign left axillary lymph nodes (one of which was a sentinel node), negative margins.  T1cN0, ER-[place]-HER2 equivocal (POSITIVE by in situ hybridization).\par

## 2022-05-05 ENCOUNTER — APPOINTMENT (OUTPATIENT)
Dept: SURGICAL ONCOLOGY | Facility: CLINIC | Age: 62
End: 2022-05-05
Payer: MEDICAID

## 2022-05-05 VITALS
TEMPERATURE: 98.3 F | OXYGEN SATURATION: 95 % | BODY MASS INDEX: 26.5 KG/M2 | HEIGHT: 62 IN | SYSTOLIC BLOOD PRESSURE: 128 MMHG | RESPIRATION RATE: 15 BRPM | HEART RATE: 72 BPM | WEIGHT: 144 LBS | DIASTOLIC BLOOD PRESSURE: 77 MMHG

## 2022-05-05 PROCEDURE — 99024 POSTOP FOLLOW-UP VISIT: CPT

## 2022-05-05 NOTE — ASSESSMENT
[FreeTextEntry1] : IMP: \par She is s/p Bilateral mastectomies with left axillary sentinel node biopsy on 4/25/22.  Pathology revealed:\par 1) RIGHT Breast: focal ADH\par 2) LEFT Breast: Invasive poorly differentiated ductal carcinoma (1.8 cm), DCIS, 2 benign left axillary lymph nodes (one of which was a sentinel node), negative margins.  T1cN0, ER-VA-HER2 equivocal (POSITIVE by in situ hybridization).\par one drain was clotted\par \par PLAN:\par Follow up with Dr. Abernathy for adjuvant chemotherapy\par RTO 5/10/2022\par

## 2022-05-05 NOTE — HISTORY OF PRESENT ILLNESS
[de-identified] : ARI LOYA  is a 62 year old female here for a postop visit.\par \par In , pt was diagnosed with T2N0M0 (2.2cm), stage IIA RIGHT breast triple negative invasive ductal carcinoma with Cartwright score 8 to 9.  She underwent lumpectomy Dr. Keyon Amezquita and then received adjuvant chemotherapy with taxotere and cytoxan for 4 cycles.  She then received IMRT with 5000cGy in 25 fractions followed by boost to the cavity of 1000cGy in 5 fractions 11-11 with Dr. Veena Walls. \par \par Pt had benign right breast stereotactic biopsy for calcifications in 2012 and s/p benign left breast US guided core biopsy 2018 (4-5:00).\par \par In 2020, screening mammogram/US showed calcifications in left mid breast as well as scattered and occasionally loosely grouped calcifications of central breast, 6mos follow up recommended. \par \par On 3/18/2021, follow up left breast mammogram showed probably benign calcifications in anterior left breast without significant change since 2020. FU in 2021 for stability.  \par \par In 21, bilateral mammo/US showed left breast 2-3:00 4cmfn 1.4cm irregular mass suspicious of malignancy, US core biopsy recommended; grouped heterogenous calcifications at right breast 10:00 anterior depth also suspicious for malignancy, bx recommended, BIRADS4. \par \par Pt underwent left breast 3:00 4cmfn US guided biopsy on 22 which showed invasive poorly differentiated ductal carcinoma, taylor score 8/9 (3+3+2), invasive tumor at least 0.8cm, microcalcifications present, no LVI.  ER-/OH-, HER2 equivocal pending  CISH. \par \par MRI breast 22: \par 1. left breast 3:00 axis biopsy proven cancer with associated 1.4 cm mass enhancement. \par 2. Additional adjacent contiguous large area of linear non mass enhancement seen extending from the biopsy site into retroareolar/central/posterior left breast, up to 9.4 cm in AP dimension. \par 3. No suspicious enhancement in the right breast \par 4. No lymphadenopathy \par (BIRADS 4, suspicious findings)\par \par CT C/A/P 2/15/22: Multiple mildly enlarged mediastinal and hilar nodes, increased in size compared to 7/10/2020. Underdistended urinary bladder with questionable wall thickening. \par \par Bone scan 2/15/22:  No scan evidence of osseous metastasis. Degenerative disease in the spine and major joints \par \par Left breast biopsy 3/1/22: Fibrocystic change including apocrine adenosis and blood clot and focal microcalcifications\par Right breast biopsy 3/1/22: Proliferative fibrocystic change including sclerosing adenosis with microcalcifications. \par \par PET/CT 3/2/22: \par 1. FDG avd focus in the left outer breast corresponds to the pt recent biopsy proven invasive poorly differentiated ductal carcinoma. A 4.4 x 2.3 cm left central breast hematoma with adjacent postprocedural changes as described above. \par 2. Indeterminate FDG avid left supraclavicular, mediastinal and hilar lymphadenopathy, new and/or enlarged since 7/10/20, The symmetric distribution of lymph nodes in the mediastinum and hilar regions suggests a benign etiology. Left supraclavicular lymph  node is accessible to an US guise percutaneous needle biopsy\par 3. A subcentimeter, minimally FDG avid left axillary lymph node is nonspecific. \par \par Left supraclavicular lymph node FNA 3/15/22: Negative for malignant cells\par \par 3/24/2022: MRI show an extensive area of enhancement area the biopsy site, CT (CAP) shows mediastinal adenopathy. Subsequent biopsies of the breast and the supraclavicular lymph node were negative. BRCA: no significant mutation. Plan for bilateral mastectomies & axillary sentinel node biopsy\par \par h/o arthritis, on duloxetine. \par \par Risk factors: Prior breast disease: as above.  Menarche: 14. Postmenopausal,  after hysterectomy.  4 pregnancies, 5 miscarriages, 4 living children. \par Age of first pregnancy 18.   Breast feedin year each child. Oral contraceptive use: yes, 5 years. Other hormone exposure: None. \par Sister leukemia dx'd age 40. Mother had multiple myeloma.  Brother stomach cancer age 68.  Daughter with CLL age 30.  Paternal 2nd cousin breast cancer age 49. Paternal 2nd cousin liver cancer age 40 had hepatitis. Family history is negative for cancer of the ovary, endometrium, prostate, pancreatic and melanoma. \par The patient is not of Ashkenazi ethnic background. Never had genetic testing.  \par \par HEALTH MAINTENANCE \par PCP Dr. Maty Nunez\par GI Dr. Guo, colonoscopy 5 years, no polyps, due now\par GYN no pap smears, s/p MARK, no vaginal bleeding or spotting\par DERM Dr. Richards and Dr. Ramsey at [place]; no hx of skin cancer\par s/p COVID vaccine Pfizer\par \par INTERVAL HISTORY:\par \par She is s/p Bilateral mastectomies with left axillary sentinel node biopsy on 22.  Pathology revealed:\par 1) RIGHT Breast: focal ADH\par 2) LEFT Breast: Invasive poorly differentiated ductal carcinoma (1.8 cm), DCIS, 2 benign left axillary lymph nodes (one of which was a sentinel node), negative margins.  T1cN0, ER-[place]-HER2 equivocal (POSITIVE by in situ hybridization).\par \par 5/3/22-  Patient seen for initial postop visit.  Left drains clotted w/ seroma (stripped with good drainage flow after), right drainst removed.\par \par 22- Patient notes sluggish drainage from left drains despite attempting to strip drains as instructed, with development of swelling. \par

## 2022-05-05 NOTE — PHYSICAL EXAM
[Normal] : well developed, well nourished, in no acute distress [de-identified] : incisions healing well, no evidence of infection; one left drain clotted ( removed ); the other drain is functioning

## 2022-05-09 ENCOUNTER — OUTPATIENT (OUTPATIENT)
Dept: OUTPATIENT SERVICES | Facility: HOSPITAL | Age: 62
LOS: 1 days | Discharge: HOME | End: 2022-05-09
Payer: MEDICAID

## 2022-05-09 DIAGNOSIS — Z90.710 ACQUIRED ABSENCE OF BOTH CERVIX AND UTERUS: Chronic | ICD-10-CM

## 2022-05-09 DIAGNOSIS — Z98.890 OTHER SPECIFIED POSTPROCEDURAL STATES: Chronic | ICD-10-CM

## 2022-05-09 DIAGNOSIS — C50.919 MALIGNANT NEOPLASM OF UNSPECIFIED SITE OF UNSPECIFIED FEMALE BREAST: ICD-10-CM

## 2022-05-10 ENCOUNTER — APPOINTMENT (OUTPATIENT)
Dept: SURGICAL ONCOLOGY | Facility: CLINIC | Age: 62
End: 2022-05-10
Payer: MEDICAID

## 2022-05-10 VITALS
BODY MASS INDEX: 26.5 KG/M2 | HEART RATE: 68 BPM | OXYGEN SATURATION: 96 % | DIASTOLIC BLOOD PRESSURE: 68 MMHG | WEIGHT: 144 LBS | SYSTOLIC BLOOD PRESSURE: 95 MMHG | HEIGHT: 62 IN

## 2022-05-10 PROCEDURE — 99024 POSTOP FOLLOW-UP VISIT: CPT

## 2022-05-10 NOTE — HISTORY OF PRESENT ILLNESS
[de-identified] : ARI LOYA  is a 62 year old female here for a postop visit.\par \par In , pt was diagnosed with T2N0M0 (2.2cm), stage IIA RIGHT breast triple negative invasive ductal carcinoma with Sturkie score 8 to 9.  She underwent lumpectomy Dr. Keyon Amezquita and then received adjuvant chemotherapy with taxotere and cytoxan for 4 cycles.  She then received IMRT with 5000cGy in 25 fractions followed by boost to the cavity of 1000cGy in 5 fractions 11-11 with Dr. Veena Walls. \par \par Pt had benign right breast stereotactic biopsy for calcifications in 2012 and s/p benign left breast US guided core biopsy 2018 (4-5:00).\par \par In 2020, screening mammogram/US showed calcifications in left mid breast as well as scattered and occasionally loosely grouped calcifications of central breast, 6mos follow up recommended. \par \par On 3/18/2021, follow up left breast mammogram showed probably benign calcifications in anterior left breast without significant change since 2020. FU in 2021 for stability.  \par \par In 21, bilateral mammo/US showed left breast 2-3:00 4cmfn 1.4cm irregular mass suspicious of malignancy, US core biopsy recommended; grouped heterogenous calcifications at right breast 10:00 anterior depth also suspicious for malignancy, bx recommended, BIRADS4. \par \par Pt underwent left breast 3:00 4cmfn US guided biopsy on 22 which showed invasive poorly differentiated ductal carcinoma, taylor score 8/9 (3+3+2), invasive tumor at least 0.8cm, microcalcifications present, no LVI.  ER-/IA-, HER2 equivocal pending  CISH. \par \par MRI breast 22: \par 1. left breast 3:00 axis biopsy proven cancer with associated 1.4 cm mass enhancement. \par 2. Additional adjacent contiguous large area of linear non mass enhancement seen extending from the biopsy site into retroareolar/central/posterior left breast, up to 9.4 cm in AP dimension. \par 3. No suspicious enhancement in the right breast \par 4. No lymphadenopathy \par (BIRADS 4, suspicious findings)\par \par CT C/A/P 2/15/22: Multiple mildly enlarged mediastinal and hilar nodes, increased in size compared to 7/10/2020. Underdistended urinary bladder with questionable wall thickening. \par \par Bone scan 2/15/22:  No scan evidence of osseous metastasis. Degenerative disease in the spine and major joints \par \par Left breast biopsy 3/1/22: Fibrocystic change including apocrine adenosis and blood clot and focal microcalcifications\par Right breast biopsy 3/1/22: Proliferative fibrocystic change including sclerosing adenosis with microcalcifications. \par \par PET/CT 3/2/22: \par 1. FDG avd focus in the left outer breast corresponds to the pt recent biopsy proven invasive poorly differentiated ductal carcinoma. A 4.4 x 2.3 cm left central breast hematoma with adjacent postprocedural changes as described above. \par 2. Indeterminate FDG avid left supraclavicular, mediastinal and hilar lymphadenopathy, new and/or enlarged since 7/10/20, The symmetric distribution of lymph nodes in the mediastinum and hilar regions suggests a benign etiology. Left supraclavicular lymph  node is accessible to an US guise percutaneous needle biopsy\par 3. A subcentimeter, minimally FDG avid left axillary lymph node is nonspecific. \par \par Left supraclavicular lymph node FNA 3/15/22: Negative for malignant cells\par \par 3/24/2022: MRI show an extensive area of enhancement area the biopsy site, CT (CAP) shows mediastinal adenopathy. Subsequent biopsies of the breast and the supraclavicular lymph node were negative. BRCA: no significant mutation. Plan for bilateral mastectomies & axillary sentinel node biopsy\par \par h/o arthritis, on duloxetine. \par \par Risk factors: Prior breast disease: as above.  Menarche: 14. Postmenopausal,  after hysterectomy.  4 pregnancies, 5 miscarriages, 4 living children. \par Age of first pregnancy 18.   Breast feedin year each child. Oral contraceptive use: yes, 5 years. Other hormone exposure: None. \par Sister leukemia dx'd age 40. Mother had multiple myeloma.  Brother stomach cancer age 68.  Daughter with CLL age 30.  Paternal 2nd cousin breast cancer age 49. Paternal 2nd cousin liver cancer age 40 had hepatitis. Family history is negative for cancer of the ovary, endometrium, prostate, pancreatic and melanoma. \par The patient is not of Ashkenazi ethnic background. Never had genetic testing.  \par \par HEALTH MAINTENANCE \par PCP Dr. Maty Nunez\par GI Dr. Guo, colonoscopy 5 years, no polyps, due now\par GYN no pap smears, s/p MARK, no vaginal bleeding or spotting\par DERM Dr. Richarsd and Dr. Ramsey at [place]; no hx of skin cancer\par s/p COVID vaccine Pfizer\par \par INTERVAL HISTORY:\par \par She is s/p Bilateral mastectomies with left axillary sentinel node biopsy on 22.  Pathology revealed:\par 1) RIGHT Breast: focal ADH\par 2) LEFT Breast: Invasive poorly differentiated ductal carcinoma (1.8 cm), DCIS, 2 benign left axillary lymph nodes (one of which was a sentinel node), negative margins.  T1cN0, ER-IA-HER2 equivocal (POSITIVE by in situ hybridization).\par \par 5/3/22-  Patient seen for initial postop visit.  Left drains clotted w/ seroma (stripped with good drainage flow after), right drainst removed.\par \par 22- Patient notes sluggish drainage from left drains despite attempting to strip drains as instructed, with development of swelling. \par \par 5/10/22- \par

## 2022-05-10 NOTE — REASON FOR VISIT
[Post-Op] : a post-op for [FreeTextEntry2] : s/p b/l mastectomies with left axillary sentinel node biopsy 4/25/22

## 2022-05-10 NOTE — ASSESSMENT
[FreeTextEntry1] : IMP: \par She is s/p Bilateral mastectomies with left axillary sentinel node biopsy on 4/25/22.  Pathology revealed:\par 1) RIGHT Breast: focal ADH\par 2) LEFT Breast: Invasive poorly differentiated ductal carcinoma (1.8 cm), DCIS, 2 benign left axillary lymph nodes (one of which was a sentinel node), negative margins.  T1cN0, ER-[place]-HER2 equivocal (POSITIVE by in situ hybridization).\par left drain removed today\par \par PLAN:\par Follow up with Dr. Abernathy for adjuvant chemotherapy\par RTO [id number]\par

## 2022-05-12 ENCOUNTER — APPOINTMENT (OUTPATIENT)
Dept: SURGICAL ONCOLOGY | Facility: CLINIC | Age: 62
End: 2022-05-12
Payer: MEDICAID

## 2022-05-12 ENCOUNTER — RESULT REVIEW (OUTPATIENT)
Age: 62
End: 2022-05-12

## 2022-05-12 ENCOUNTER — APPOINTMENT (OUTPATIENT)
Dept: HEMATOLOGY ONCOLOGY | Facility: CLINIC | Age: 62
End: 2022-05-12
Payer: MEDICAID

## 2022-05-12 VITALS
SYSTOLIC BLOOD PRESSURE: 114 MMHG | OXYGEN SATURATION: 99 % | WEIGHT: 149.25 LBS | HEART RATE: 62 BPM | RESPIRATION RATE: 16 BRPM | TEMPERATURE: 98.2 F | BODY MASS INDEX: 27.3 KG/M2 | DIASTOLIC BLOOD PRESSURE: 72 MMHG

## 2022-05-12 VITALS
BODY MASS INDEX: 27.42 KG/M2 | TEMPERATURE: 97 F | OXYGEN SATURATION: 97 % | HEART RATE: 55 BPM | DIASTOLIC BLOOD PRESSURE: 79 MMHG | WEIGHT: 149 LBS | RESPIRATION RATE: 15 BRPM | SYSTOLIC BLOOD PRESSURE: 134 MMHG | HEIGHT: 62 IN

## 2022-05-12 LAB
BASOPHILS # BLD AUTO: 0.04 K/UL — SIGNIFICANT CHANGE UP (ref 0–0.2)
BASOPHILS NFR BLD AUTO: 0.6 % — SIGNIFICANT CHANGE UP (ref 0–2)
EOSINOPHIL # BLD AUTO: 0.39 K/UL — SIGNIFICANT CHANGE UP (ref 0–0.5)
EOSINOPHIL NFR BLD AUTO: 5.9 % — SIGNIFICANT CHANGE UP (ref 0–6)
HCT VFR BLD CALC: 30.4 % — LOW (ref 34.5–45)
HGB BLD-MCNC: 9.8 G/DL — LOW (ref 11.5–15.5)
IMM GRANULOCYTES NFR BLD AUTO: 0.8 % — SIGNIFICANT CHANGE UP (ref 0–1.5)
LYMPHOCYTES # BLD AUTO: 1.53 K/UL — SIGNIFICANT CHANGE UP (ref 1–3.3)
LYMPHOCYTES # BLD AUTO: 23.1 % — SIGNIFICANT CHANGE UP (ref 13–44)
MCHC RBC-ENTMCNC: 28.7 PG — SIGNIFICANT CHANGE UP (ref 27–34)
MCHC RBC-ENTMCNC: 32.2 G/DL — SIGNIFICANT CHANGE UP (ref 32–36)
MCV RBC AUTO: 88.9 FL — SIGNIFICANT CHANGE UP (ref 80–100)
MONOCYTES # BLD AUTO: 0.58 K/UL — SIGNIFICANT CHANGE UP (ref 0–0.9)
MONOCYTES NFR BLD AUTO: 8.8 % — SIGNIFICANT CHANGE UP (ref 2–14)
NEUTROPHILS # BLD AUTO: 4.02 K/UL — SIGNIFICANT CHANGE UP (ref 1.8–7.4)
NEUTROPHILS NFR BLD AUTO: 60.8 % — SIGNIFICANT CHANGE UP (ref 43–77)
NRBC # BLD: 0 /100 WBCS — SIGNIFICANT CHANGE UP (ref 0–0)
PLATELET # BLD AUTO: 318 K/UL — SIGNIFICANT CHANGE UP (ref 150–400)
RBC # BLD: 3.42 M/UL — LOW (ref 3.8–5.2)
RBC # FLD: 13.6 % — SIGNIFICANT CHANGE UP (ref 10.3–14.5)
WBC # BLD: 6.61 K/UL — SIGNIFICANT CHANGE UP (ref 3.8–10.5)
WBC # FLD AUTO: 6.61 K/UL — SIGNIFICANT CHANGE UP (ref 3.8–10.5)

## 2022-05-12 PROCEDURE — 99214 OFFICE O/P EST MOD 30 MIN: CPT

## 2022-05-12 PROCEDURE — 99024 POSTOP FOLLOW-UP VISIT: CPT

## 2022-05-12 PROCEDURE — 10140 I&D HMTMA SEROMA/FLUID COLLJ: CPT | Mod: 58

## 2022-05-12 NOTE — HISTORY OF PRESENT ILLNESS
[de-identified] : ARI LOYA  is a 62 year old female here for a postop visit.\par \par In , pt was diagnosed with T2N0M0 (2.2cm), stage IIA RIGHT breast triple negative invasive ductal carcinoma with Springfield score 8 to 9.  She underwent lumpectomy Dr. Keyon Amezquita and then received adjuvant chemotherapy with taxotere and cytoxan for 4 cycles.  She then received IMRT with 5000cGy in 25 fractions followed by boost to the cavity of 1000cGy in 5 fractions 11-11 with Dr. Veena Walls. \par \par Pt had benign right breast stereotactic biopsy for calcifications in 2012 and s/p benign left breast US guided core biopsy 2018 (4-5:00).\par \par In 2020, screening mammogram/US showed calcifications in left mid breast as well as scattered and occasionally loosely grouped calcifications of central breast, 6mos follow up recommended. \par \par On 3/18/2021, follow up left breast mammogram showed probably benign calcifications in anterior left breast without significant change since 2020. FU in 2021 for stability.  \par \par In 21, bilateral mammo/US showed left breast 2-3:00 4cmfn 1.4cm irregular mass suspicious of malignancy, US core biopsy recommended; grouped heterogenous calcifications at right breast 10:00 anterior depth also suspicious for malignancy, bx recommended, BIRADS4. \par \par Pt underwent left breast 3:00 4cmfn US guided biopsy on 22 which showed invasive poorly differentiated ductal carcinoma, taylor score 8/9 (3+3+2), invasive tumor at least 0.8cm, microcalcifications present, no LVI.  ER-/CO-, HER2 equivocal pending  CISH. \par \par MRI breast 22: \par 1. left breast 3:00 axis biopsy proven cancer with associated 1.4 cm mass enhancement. \par 2. Additional adjacent contiguous large area of linear non mass enhancement seen extending from the biopsy site into retroareolar/central/posterior left breast, up to 9.4 cm in AP dimension. \par 3. No suspicious enhancement in the right breast \par 4. No lymphadenopathy \par (BIRADS 4, suspicious findings)\par \par CT C/A/P 2/15/22: Multiple mildly enlarged mediastinal and hilar nodes, increased in size compared to 7/10/2020. Underdistended urinary bladder with questionable wall thickening. \par \par Bone scan 2/15/22:  No scan evidence of osseous metastasis. Degenerative disease in the spine and major joints \par \par Left breast biopsy 3/1/22: Fibrocystic change including apocrine adenosis and blood clot and focal microcalcifications\par Right breast biopsy 3/1/22: Proliferative fibrocystic change including sclerosing adenosis with microcalcifications. \par \par PET/CT 3/2/22: \par 1. FDG avd focus in the left outer breast corresponds to the pt recent biopsy proven invasive poorly differentiated ductal carcinoma. A 4.4 x 2.3 cm left central breast hematoma with adjacent postprocedural changes as described above. \par 2. Indeterminate FDG avid left supraclavicular, mediastinal and hilar lymphadenopathy, new and/or enlarged since 7/10/20, The symmetric distribution of lymph nodes in the mediastinum and hilar regions suggests a benign etiology. Left supraclavicular lymph  node is accessible to an US guise percutaneous needle biopsy\par 3. A subcentimeter, minimally FDG avid left axillary lymph node is nonspecific. \par \par Left supraclavicular lymph node FNA 3/15/22: Negative for malignant cells\par \par 3/24/2022: MRI show an extensive area of enhancement area the biopsy site, CT (CAP) shows mediastinal adenopathy. Subsequent biopsies of the breast and the supraclavicular lymph node were negative. BRCA: no significant mutation. Plan for bilateral mastectomies & axillary sentinel node biopsy\par \par h/o arthritis, on duloxetine. \par \par Risk factors: Prior breast disease: as above.  Menarche: 14. Postmenopausal,  after hysterectomy.  4 pregnancies, 5 miscarriages, 4 living children. \par Age of first pregnancy 18.   Breast feedin year each child. Oral contraceptive use: yes, 5 years. Other hormone exposure: None. \par Sister leukemia dx'd age 40. Mother had multiple myeloma.  Brother stomach cancer age 68.  Daughter with CLL age 30.  Paternal 2nd cousin breast cancer age 49. Paternal 2nd cousin liver cancer age 40 had hepatitis. Family history is negative for cancer of the ovary, endometrium, prostate, pancreatic and melanoma. \par The patient is not of Ashkenazi ethnic background. Never had genetic testing.  \par \par HEALTH MAINTENANCE \par PCP Dr. Maty Nunez\par GI Dr. Guo, colonoscopy 5 years, no polyps, due now\par GYN no pap smears, s/p MARK, no vaginal bleeding or spotting\par DERM Dr. Richards and Dr. Ramsey at [place]; no hx of skin cancer\par s/p COVID vaccine Pfizer\par \par INTERVAL HISTORY:\par \par She is s/p Bilateral mastectomies with left axillary sentinel node biopsy on 22.  Pathology revealed:\par 1) RIGHT Breast: focal ADH\par 2) LEFT Breast: Invasive poorly differentiated ductal carcinoma (1.8 cm), DCIS, 2 benign left axillary lymph nodes (one of which was a sentinel node), negative margins.  T1cN0, ER-CO-HER2 equivocal (POSITIVE by in situ hybridization).\par \par 5/3/22-  Patient seen for initial postop visit.  Left drains clotted w/ seroma (stripped with good drainage flow after), right drainst removed.\par \par 22- Patient notes sluggish drainage from left drains despite attempting to strip drains as instructed, with development of swelling. \par \par \par

## 2022-05-12 NOTE — ASSESSMENT
[FreeTextEntry1] : IMP: \par She is s/p Bilateral mastectomies with left axillary sentinel node biopsy on 4/25/22.  Pathology revealed:\par 1) RIGHT Breast: focal ADH\par 2) LEFT Breast: Invasive poorly differentiated ductal carcinoma (1.8 cm), DCIS, 2 benign left axillary lymph nodes (one of which was a sentinel node), negative margins.  T1cN0, ER-[place]-HER2 equivocal (POSITIVE by in situ hybridization).\par Oral consent was obtained\par \par The area (   ) was prepped with alcohol.\par \par Local anesthesia was instilled with 1% lidocaine.\par \par The seroma was I and D'ed for    20  cc's.\par \par A sterile dressing was applied.\par \par The patient tolerated the procedure well.\par \par Follow-up instructions were given.\par \par \par PLAN:\par Follow up with Dr. Abernathy for adjuvant chemotherapy\par RTO [id number]\par

## 2022-05-12 NOTE — REASON FOR VISIT
[Breast Cancer] : breast cancer [Post-Op] : a post-op for [FreeTextEntry2] : s/p b/l mastectomies with left axillary sentinel node biopsy 4/25/22

## 2022-05-16 ENCOUNTER — APPOINTMENT (OUTPATIENT)
Dept: PULMONOLOGY | Facility: CLINIC | Age: 62
End: 2022-05-16
Payer: MEDICAID

## 2022-05-16 VITALS
BODY MASS INDEX: 26.87 KG/M2 | DIASTOLIC BLOOD PRESSURE: 80 MMHG | TEMPERATURE: 97.3 F | RESPIRATION RATE: 15 BRPM | HEART RATE: 66 BPM | SYSTOLIC BLOOD PRESSURE: 123 MMHG | OXYGEN SATURATION: 98 % | WEIGHT: 146 LBS | HEIGHT: 62 IN

## 2022-05-16 PROCEDURE — 99215 OFFICE O/P EST HI 40 MIN: CPT

## 2022-05-16 NOTE — PHYSICAL EXAM
[No Acute Distress] : no acute distress [Normal Oropharynx] : normal oropharynx [Normal Appearance] : normal appearance [No Neck Mass] : no neck mass [No Resp Distress] : no resp distress [No Acc Muscle Use] : no acc muscle use [Normal Rhythm and Effort] : normal rhythm and effort [Clear to Auscultation Bilaterally] : clear to auscultation bilaterally [Rhonchi] : rhonchi [No Abnormalities] : no abnormalities [Benign] : benign [No Focal Deficits] : no focal deficits [Normal Affect] : normal affect

## 2022-05-17 ENCOUNTER — APPOINTMENT (OUTPATIENT)
Dept: SURGICAL ONCOLOGY | Facility: CLINIC | Age: 62
End: 2022-05-17
Payer: MEDICAID

## 2022-05-17 VITALS
BODY MASS INDEX: 26.87 KG/M2 | DIASTOLIC BLOOD PRESSURE: 73 MMHG | HEIGHT: 62 IN | OXYGEN SATURATION: 97 % | SYSTOLIC BLOOD PRESSURE: 127 MMHG | WEIGHT: 146 LBS | HEART RATE: 72 BPM

## 2022-05-17 LAB
ALBUMIN SERPL ELPH-MCNC: 4.3 G/DL
ALP BLD-CCNC: 109 U/L
ALT SERPL-CCNC: 14 U/L
ANION GAP SERPL CALC-SCNC: 10 MMOL/L
APTT BLD: 31.9 SEC
AST SERPL-CCNC: 14 U/L
BILIRUB SERPL-MCNC: <0.2 MG/DL
BUN SERPL-MCNC: 8 MG/DL
CALCIUM SERPL-MCNC: 9.1 MG/DL
CHLORIDE SERPL-SCNC: 97 MMOL/L
CO2 SERPL-SCNC: 25 MMOL/L
CREAT SERPL-MCNC: 0.52 MG/DL
EGFR: 105 ML/MIN/1.73M2
GLUCOSE SERPL-MCNC: 106 MG/DL
HAV IGM SER QL: NONREACTIVE
HBV CORE IGM SER QL: NONREACTIVE
HBV SURFACE AG SER QL: NONREACTIVE
HCV AB SER QL: NONREACTIVE
HCV S/CO RATIO: 0.16 S/CO
INR PPP: 1.07 RATIO
POTASSIUM SERPL-SCNC: 4.8 MMOL/L
PROT SERPL-MCNC: 6.7 G/DL
PT BLD: 12.4 SEC
SODIUM SERPL-SCNC: 132 MMOL/L

## 2022-05-17 PROCEDURE — 99024 POSTOP FOLLOW-UP VISIT: CPT

## 2022-05-17 NOTE — ASSESSMENT
[FreeTextEntry1] : IMP: \par She is s/p Bilateral mastectomies with left axillary sentinel node biopsy on 4/25/22.  Pathology revealed:\par 1) RIGHT Breast: focal ADH\par 2) LEFT Breast: Invasive poorly differentiated ductal carcinoma (1.8 cm), DCIS, 2 benign left axillary lymph nodes (one of which was a sentinel node), negative margins.  T1cN0, ER-AR-HER2 equivocal (POSITIVE by in situ hybridization).\par \par \par PLAN:\par Warm soaks to hematoma\par Follow up with Dr. Abernathy for adjuvant chemotherapy\par RTO 3 weeks\par

## 2022-05-17 NOTE — HISTORY OF PRESENT ILLNESS
[de-identified] : ARI LOYA  is a 62 year old female here for a postop visit.\par \par In , pt was diagnosed with T2N0M0 (2.2cm), stage IIA RIGHT breast triple negative invasive ductal carcinoma with [place] score 8 to 9.  She underwent lumpectomy Dr. Keyon Amezquita and then received adjuvant chemotherapy with taxotere and cytoxan for 4 cycles.  She then received IMRT with 5000cGy in 25 fractions followed by boost to the cavity of 1000cGy in 5 fractions 11-11 with Dr. Veena Walls. \par \par Pt had benign right breast stereotactic biopsy for calcifications in 2012 and s/p benign left breast US guided core biopsy 2018 (4-5:00).\par \par In 2020, screening mammogram/US showed calcifications in left mid breast as well as scattered and occasionally loosely grouped calcifications of central breast, 6mos follow up recommended. \par \par On 3/18/2021, follow up left breast mammogram showed probably benign calcifications in anterior left breast without significant change since 2020. FU in 2021 for stability.  \par \par In 21, bilateral mammo/US showed left breast 2-3:00 4cmfn 1.4cm irregular mass suspicious of malignancy, US core biopsy recommended; grouped heterogenous calcifications at right breast 10:00 anterior depth also suspicious for malignancy, bx recommended, BIRADS4. \par \par Pt underwent left breast 3:00 4cmfn US guided biopsy on 22 which showed invasive poorly differentiated ductal carcinoma, taylor score 8/9 (3+3+2), invasive tumor at least 0.8cm, microcalcifications present, no LVI.  ER-/OH-, HER2 equivocal pending  CISH. \par \par MRI breast 22: \par 1. left breast 3:00 axis biopsy proven cancer with associated 1.4 cm mass enhancement. \par 2. Additional adjacent contiguous large area of linear non mass enhancement seen extending from the biopsy site into retroareolar/central/posterior left breast, up to 9.4 cm in AP dimension. \par 3. No suspicious enhancement in the right breast \par 4. No lymphadenopathy \par (BIRADS 4, suspicious findings)\par \par CT C/A/P 2/15/22: Multiple mildly enlarged mediastinal and hilar nodes, increased in size compared to 7/10/2020. Underdistended urinary bladder with questionable wall thickening. \par \par Bone scan 2/15/22:  No scan evidence of osseous metastasis. Degenerative disease in the spine and major joints \par \par Left breast biopsy 3/1/22: Fibrocystic change including apocrine adenosis and blood clot and focal microcalcifications\par Right breast biopsy 3/1/22: Proliferative fibrocystic change including sclerosing adenosis with microcalcifications. \par \par PET/CT 3/2/22: \par 1. FDG avd focus in the left outer breast corresponds to the pt recent biopsy proven invasive poorly differentiated ductal carcinoma. A 4.4 x 2.3 cm left central breast hematoma with adjacent postprocedural changes as described above. \par 2. Indeterminate FDG avid left supraclavicular, mediastinal and hilar lymphadenopathy, new and/or enlarged since 7/10/20, The symmetric distribution of lymph nodes in the mediastinum and hilar regions suggests a benign etiology. Left supraclavicular lymph  node is accessible to an US guise percutaneous needle biopsy\par 3. A subcentimeter, minimally FDG avid left axillary lymph node is nonspecific. \par \par Left supraclavicular lymph node FNA 3/15/22: Negative for malignant cells\par \par 3/24/2022: MRI show an extensive area of enhancement area the biopsy site, CT (CAP) shows mediastinal adenopathy. Subsequent biopsies of the breast and the supraclavicular lymph node were negative. BRCA: no significant mutation. Plan for bilateral mastectomies & axillary sentinel node biopsy\par \par h/o arthritis, on duloxetine. \par \par Risk factors: Prior breast disease: as above.  Menarche: 14. Postmenopausal,  after hysterectomy.  4 pregnancies, 5 miscarriages, 4 living children. \par Age of first pregnancy 18.   Breast feedin year each child. Oral contraceptive use: yes, 5 years. Other hormone exposure: None. \par Sister leukemia dx'd age 40. Mother had multiple myeloma.  Brother stomach cancer age 68.  Daughter with CLL age 30.  Paternal 2nd cousin breast cancer age 49. Paternal 2nd cousin liver cancer age 40 had hepatitis. Family history is negative for cancer of the ovary, endometrium, prostate, pancreatic and melanoma. \par The patient is not of Ashkenazi ethnic background. Never had genetic testing.  \par \par HEALTH MAINTENANCE \par PCP Dr. Maty Nunez\par GI Dr. Gou, colonoscopy 5 years, no polyps, due now\par GYN no pap smears, s/p MARK, no vaginal bleeding or spotting\par DERM Dr. Ricahrds and Dr. Ramsey at [place]; no hx of skin cancer\par s/p COVID vaccine Pfizer\par \par INTERVAL HISTORY:\par \par She is s/p Bilateral mastectomies with left axillary sentinel node biopsy on 22.  Pathology revealed:\par 1) RIGHT Breast: focal ADH\par 2) LEFT Breast: Invasive poorly differentiated ductal carcinoma (1.8 cm), DCIS, 2 benign left axillary lymph nodes (one of which was a sentinel node), negative margins.  T1cN0, ER-OH-HER2 equivocal (POSITIVE by in situ hybridization).\par \par 5/3/22-  Patient seen for initial postop visit.  Left drains clotted w/ seroma (stripped with good drainage flow after), right drainst removed.\par \par 22- Patient notes sluggish drainage from left drains despite attempting to strip drains as instructed, with development of swelling. \par \par 5/10/22- left drain removed today \par \par 22- Seroma drained in office, I & D'ed for ~20cc. \par

## 2022-05-19 ENCOUNTER — NON-APPOINTMENT (OUTPATIENT)
Age: 62
End: 2022-05-19

## 2022-05-19 ENCOUNTER — APPOINTMENT (OUTPATIENT)
Dept: HEMATOLOGY ONCOLOGY | Facility: CLINIC | Age: 62
End: 2022-05-19
Payer: MEDICAID

## 2022-05-19 VITALS
SYSTOLIC BLOOD PRESSURE: 119 MMHG | BODY MASS INDEX: 26.87 KG/M2 | RESPIRATION RATE: 17 BRPM | TEMPERATURE: 97.9 F | HEART RATE: 65 BPM | DIASTOLIC BLOOD PRESSURE: 68 MMHG | WEIGHT: 145.99 LBS | OXYGEN SATURATION: 99 % | HEIGHT: 61.97 IN

## 2022-05-19 PROCEDURE — 93010 ELECTROCARDIOGRAM REPORT: CPT

## 2022-05-19 PROCEDURE — 99215 OFFICE O/P EST HI 40 MIN: CPT

## 2022-05-20 ENCOUNTER — APPOINTMENT (OUTPATIENT)
Dept: CARDIOLOGY | Facility: CLINIC | Age: 62
End: 2022-05-20
Payer: MEDICAID

## 2022-05-20 PROCEDURE — 93306 TTE W/DOPPLER COMPLETE: CPT

## 2022-05-23 ENCOUNTER — NON-APPOINTMENT (OUTPATIENT)
Age: 62
End: 2022-05-23

## 2022-05-23 ENCOUNTER — APPOINTMENT (OUTPATIENT)
Dept: ENDOVASCULAR SURGERY | Facility: CLINIC | Age: 62
End: 2022-05-23
Payer: MEDICAID

## 2022-05-23 ENCOUNTER — RESULT REVIEW (OUTPATIENT)
Age: 62
End: 2022-05-23

## 2022-05-23 PROCEDURE — 76937 US GUIDE VASCULAR ACCESS: CPT

## 2022-05-23 PROCEDURE — 36561 INSERT TUNNELED CV CATH: CPT

## 2022-05-23 PROCEDURE — 77001 FLUOROGUIDE FOR VEIN DEVICE: CPT

## 2022-05-23 RX ORDER — CEFADROXIL 500 MG/1
500 CAPSULE ORAL TWICE DAILY
Qty: 28 | Refills: 3 | Status: DISCONTINUED | COMMUNITY
Start: 2022-05-10 | End: 2022-05-23

## 2022-05-23 NOTE — REASON FOR VISIT
[Follow-Up Visit] : a follow-up [Other: _____] : [unfilled] [Patient Declined  Services] : - None: Patient declined  services [FreeTextEntry2] : breast cancer [FreeTextEntry3] : Daughter

## 2022-05-23 NOTE — DISCUSSION/SUMMARY
[FreeTextEntry1] : The patients most recent CT scan was reviewed by my independently and my interpretation is stated below:\par \par CT demonstrated bilateral metastatic hilar adenopathy with mild PET uptake. Previous CT has demonstrated a hilar but mediastinal apathy though this one looks larger. \par

## 2022-05-23 NOTE — ASSESSMENT
[FreeTextEntry1] : 62F PMHx of stage IIa right breast triple negative invasive ductal carcinoma who initially underwent a lumpectomy and adjuvant chemotherapy, now with s/p Bilateral mastectomies with left axillary sentinel node biopsy on 4/25/22. Pathology revealed RIGHT Breast focal atypical ductal hyperplasia and LEFT Breast invasive poorly differentiated ductal carcinoma (1.8 cm), DCIS, 2 benign left axillary lymph nodes (one of which was a sentinel node). Interventional pulmonary consulted for mediastinal and hilar lymph nodes which have enlarged with mild PET scan uptake. Per oncology, unlikely related to primary malignancy however reasonable to pursue tissue diagnosis to rule out metastatic disease. Other consideration include new primary malignancy, sarcoid, or new infection. \par \par #Mediastinal and hilar lymphadenopathy\par -enlarged with mild PET scan uptake. Per oncology, unlikely related to primary malignancy however reasonable to pursue tissue diagnosis to rule out metastatic disease or other etiologies. Other consideration include new primary malignancy, sarcoid, or new infection. Differential diagnosis of this mediastinal and hilar adenopathy includes inflammatory etiology including but not limited to sarcoidosis, infectious etiologies as well as malignancy including lymphoma, lung cancer of other malignancies.\par \par Patient wants to proceed with a diagnostic procedure.\par -Patient with chemoport scheduled for 5/23/22. Will plan for EBUS after. \par -Will plan for EBUS after chemoport placement\par -She is not on any . Labs were done including coagulation panel on 5/12/22. \par -Procedure discussed at length. \par \par The risk and benefits of EBUS including the risk of bleeding and risk of pneumothorax was discussed with the patient and he demonstrated understanding. Will need COVID swab and presurgical testing prior to scheduling the procedure. The office will co-ordinate testing and pre-surgical appointment.\par \par Will need COVID swab and presurgical testing prior to scheduling the procedure. The office will co-ordinate testing and pre-surgical appointment.\par \par \par \par \par

## 2022-05-23 NOTE — ASSESSMENT
[FreeTextEntry1] : ARI LOYA  is a 62 year old female with history of T2N0M0 (2.2cm), stage IIA RIGHT breast triple negative invasive ductal carcinoma, s/p adjuvant TC chemotherapy and RT.  She is here for newly diagnosed contralateral left invasive poorly differentiated ductal carcinoma, cT1cN0, ER negative <1%, LA negative <1%, HER2 positive. \par \par -we discussed the role of adjuvant chemotherapy \par -we reviewed treatment plan of Taxol x12 + weekly Herceptin x 1 year and herceptin treatment. We reviewed side effects including but not limited to bone marrow suppression, alopecia, fatigue, infusion reaction, body aches, nausea, vomiting, mucositis, neuropathy, liver/pulmonary toxicity, cardiomyopathy and rare risk of hematologic malignancy. \par - 5/27/2022 - EBUS for pulmonary finding\par - Echocardiogram, mediport insertion and bloodwork ordered today to be done prior to chemotherapy\par - EKG obtained, reviewed: NSR \par - Consent was obtained by Dr. Abernathy from Patient \par -patient was given the time to ask questions which I answered to the best of my ability and to her apparent satisfaction. I encouraged her to call me with any additional questions. \par

## 2022-05-23 NOTE — HISTORY OF PRESENT ILLNESS
[TextBox_4] : INTERVENTIONAL PULMONARY FOLLOW UP.\par \par Daughter serving as Portuguese  with patients consent. \par \par 62F PMHx of stage IIa right breast triple negative invasive ductal carcinoma who initially underwent a lumpectomy in the past. She then received adjuvant chemotherapy. Followed by IMRT. In December 21 she was noted to have irregular mass suspicious for malignancy and ultrasound biopsy was performed. The biopsy demonstrated poorly differentiated ductal carcinoma.\par \par She is s/p Bilateral mastectomies with left axillary sentinel node biopsy on 4/25/22. Pathology revealed:\par 1) RIGHT Breast: focal atypical ductal hyperplasia. \par 2) LEFT Breast: Invasive poorly differentiated ductal carcinoma (1.8 cm), DCIS, 2 benign left axillary lymph nodes (one of which was a sentinel node), negative margins. T1cN0, ER-AR-HER2 equivocal (POSITIVE by in situ hybridization).\par \par She had underwent biopsies of the supraclavicular lymph node which were negative. During this work-up she had a CT of the chest performed which demonstrated mediastinal adenopathy. She also the PET scan which demonstrated mild uptake in the mediastinal lymph nodes. She also had bilateral hilar hilar lymph nodes. Per oncology, this is unlikely related to her breast CA. \par \par Today,\par Patient with some post procedure cough at times when taking a deep breath. This is slowly improving. Denies any fever, chills, nausea, vomiting. Denies hemoptysis. She was referred to use for EBUS and would like to proceed. She is not on any blood thinners. Labs were done including coagulation panel on 5/12/22. \par \par Of note, she is planned for a chemo port on 5/23/22 with subsequent use of chemotherapy. \par

## 2022-05-23 NOTE — REASON FOR VISIT
[Other ___] : a [unfilled] visit for [Family Member] : family member [FreeTextEntry2] : mediport insertion/breast cancer

## 2022-05-23 NOTE — PAST MEDICAL HISTORY
[Increasing age ( >40 years old)] : Increasing age ( >40 years old) [Malignancy] : Malignancy [No therapy indicated for cases scheduled for less than one hour] : No therapy indicated for cases scheduled for less than one hour. [FreeTextEntry1] : Malignant Hyperthermia Screening Tool and Risk of Bleeding Assessment\par \par Ms. ARI LOYA denies family history of unexpected death following Anesthesia or Exercise.\par Denies Family history of Malignant Hyperthermia, Muscle or Neuromuscular disorder and High Temperature following exercise.\par \par Ms. ARI LOYA denies history of Muscle Spasm, Dark or Chocolate - Colored urine and Unanticipated fever immediately following anesthesia or serious exercise. \par Ms. LOYA also denies bleeding tendencies/ Risks of Bleeding.

## 2022-05-23 NOTE — PROCEDURE
[D/C IV on discharge] : D/C IV on discharge [Resume diet] : resume diet [Site check for bleeding/hematoma] : Site check for bleeding/hematoma [Vital signs on admission the q 15 mins x2] : Vital signs on admission the q 15 mins x2 [FreeTextEntry1] : Right chest mediport insertion

## 2022-05-23 NOTE — HISTORY OF PRESENT ILLNESS
[FreeTextEntry1] : alert and orientated x 3\par accompanied by daughter Shobha [phone]/translates into Beninese per patient request \par covid test: not detected 5/21/22\par covid vaccinated \par no medications this morning [FreeTextEntry5] : yesterday 7pm  [FreeTextEntry6] : Dr Newman

## 2022-05-23 NOTE — ASSESSMENT
[Other: _____] : [unfilled] [FreeTextEntry1] : 62F old right breast ca, new left breast ca, s/p bilateral mastectomy\par Pain, soreness, hematoma left\par Never had right arm swelling\par Referred for port\par Informed consent obtained, daughter at bedside.

## 2022-05-23 NOTE — HISTORY OF PRESENT ILLNESS
[de-identified] : In 2011, pt was diagnosed with T2N0M0 (2.2cm), stage IIA RIGHT breast triple negative invasive ductal carcinoma with Taylor score 8 to 9.  She underwent lumpectomy Dr. Keyon Amezquita and then received adjuvant chemotherapy with taxotere and cytoxan for 4 cycles.  She then received IMRT with 5000cGy in 25 fractions followed by boost to the cavity of 1000cGy in 5 fractions 11/9/11-12/21/11 with Dr. Veena Walls. \par \par Pt had benign right breast stereotactic biopsy for calcifications in 7/26/2012 and s/p benign left breast US guided core biopsy 9/7/2018 (4-5:00).\par \par In 8/13/2020, screening mammogram/US showed calcifications in left mid breast as well as scattered and occasionally loosely grouped calcifications of central breast, 6mos follow up recommended. \par \par On 3/18/2021, follow up left breast mammogram showed probably benign calcifications in anterior left breast without significant change since 8/13/2020. FU in 8/2021 for stability.  \par \par In 12/28/21, bilateral mammo/US showed left breast 2-3:00 4cmfn 1.4cm irregular mass suspicious of malignancy, US core biopsy recommended; grouped heterogenous calcifications at right breast 10:00 anterior depth also suspicious for malignancy, bx recommended, BIRADS4. \par \par Pt underwent left breast 3:00 4cmfn US guided biopsy on 1/31/22 which showed invasive poorly differentiated ductal carcinoma, taylor score 8/9 (3+3+2), invasive tumor at least 0.8cm, microcalcifications present, no LVI.  ER negative <1%, NC negative <1%, HER2 2+ IHC, CISH pending. \par \par MRI breast on 2/14/22 showed leftbreast 3:00 biopsy proven cancer associatedwith 1.4cmmass enhancement;adjacent continguous large area oflinerar nonmassenhancement seen extending from biopsysite into retroareolar/central/posterior left breast upto9.4cm,2 site MRI biopsy rec if alter surgical mgt;nosuspiciousenhancementin rightbrast,No ALD; [id number].\par \par MRI biopsyon 3/1/22 of both areas showedfibrocystic changes including sclerosing adenosis \par \par CT C/A/P 2/15/22 showedmultiple mildly enlargedmediastinal andhilarLNs,increasedn size compaerdto7/10/2020. \par Same day bone scan negativefor osseous metastasis. \par \par PET scan 3/2/22 showed knownleftbreast cancer,adjacent 4.4cm hematoma, indeterminate FDG avid leftsupraclavicular, mediastinal and hilar LAD new and/or enhlargedsince7/10/2020, mediastinal and hilar LNs symmetric distrubution suggests abenign etiology;leftsupraclavicularLNaccess toUS guidedbiopsy; subcm minimallyFDGavidleftaxillary LN nonspecific,,maybebiopsies. \par \par Leftsupraclavicular LN US guided FNA was negatibve formalignantcells. on 3/15/22 showed\par \par Saw Dr. Anmol Sanford pulmonary on4/11/22. Followupafter surgery to assessas the mediastinal LNsunlikelyelatedtobreastcancer. \par \par 4/25/2022 - Dr. coreas performed lumpectomy: final surgical pathology as follows:\par 1.8 cm poorly differentiated IDC with two negative SN; ER 0%; NC 0%; HER 2 + by FISH\par \par Sister leukemia dx'd age 40. Mother had multiple myeloma.  Brother stomach cancer age 68.  Daughter with CLL age 30.  Paternal 2nd cousin breast cancer age 49. Paternal 2nd cousin liver cancer age 40 had hepatitis. Family history is negative for cancer of the ovary, endometrium, prostate, pancreatic and melanoma. \par The patient is not of Ashkenazi ethnic background. Never had genetic testing.  \par \par HEALTH MAINTENANCE \par PCP Dr. Maty Nunez\par GI Dr. Guo, colonoscopy 5 years, no polyps, due now\par GYN no pap smears, s/p MARK, no vaginal bleeding or spotting\par DERM Dr. Richards and Dr. Ramsey at [facility]; no hx of skin cancer\par MSK h/o arthritis on duloxetine. \par s/p COVID vaccine Pfizer [de-identified] : 5/19/2022\par Patient presents today to review chemotherapy plan, schedule, side effects of treatment and  management of toxicity.\par Plan: herceptin + weekly taxol \par Pt underwent bilateral mastectomies on 4/25/22 with Dr. Leon.\par Left mastectomy showed invasive poorly differentiated ductal carcinoma,[place] 8/9, measures 1.8cm, pT1c, DCIS high nuclear grade with comedo necrosis, solid type, intratumoral and peritumoral foci, margins negative, no LVI,complex sclerosing lesion, previously biopsy site changes,1 lymph node negative.\par Right breast mastectomy showed focal atypical ductal hyperplasia, fibrocystic changes, skin with scar, 1 SLN negative.\par Seeing 5/27 for EBUS and biopsy Dr. Sanford.

## 2022-05-25 NOTE — ASSESSMENT
[FreeTextEntry1] : ARI LOYA  is a 62 year old female with history of T2N0M0 (2.2cm), stage IIA RIGHT breast triple negative invasive ductal carcinoma, s/p adjuvant TC chemotherapy and RT.  She is here for newly diagnosed contralateral left invasive poorly differentiated ductal carcinoma, pT1cN0, ER negative <1%, CT negative <1%, HER2 positive.   Pt underwent bilateral mastectomies.\par \par We discussed treatment with taxol and herceptin x 12 weeks and then continue herceptin therapy to complete 52 weeks as per the APT study. We reviewed potential side effects including but not limited to bone marrow suppression, alopecia, fatigue, infusion reaction, body aches, nausea, vomiting, mucositis, neuropathy, liver/pulmonary toxicity, cardiomyopathy and rare risk of hematologic malignancy. Reviewed option for scalp cooling to prevent chemotherapy-induced alopecia which pt declines.  She will have baseline echocardiogram prior to beginning treatment as well as monitoring on herceptin therapy every 3 months. \par \par Seeing Dr. Sanford 5/27 for EBUS and biopsy. Will plan for chemotherapy after results.\par \par Patient was given the time to ask questions which I answered to the best of my ability and to her apparent satisfaction.  I encouraged her to call me with any additional questions. \par   [Curative] : Goals of care discussed with patient: Curative

## 2022-05-25 NOTE — HISTORY OF PRESENT ILLNESS
[de-identified] : In 2011, pt was diagnosed with T2N0M0 (2.2cm), stage IIA RIGHT breast triple negative invasive ductal carcinoma with Taylor score 8 to 9.  She underwent lumpectomy Dr. Keyon Amezquita and then received adjuvant chemotherapy with taxotere and cytoxan for 4 cycles.  She then received IMRT with 5000cGy in 25 fractions followed by boost to the cavity of 1000cGy in 5 fractions 11/9/11-12/21/11 with Dr. Veena Walls. \par \par Pt had benign right breast stereotactic biopsy for calcifications in 7/26/2012 and s/p benign left breast US guided core biopsy 9/7/2018 (4-5:00).\par \par In 8/13/2020, screening mammogram/US showed calcifications in left mid breast as well as scattered and occasionally loosely grouped calcifications of central breast, 6mos follow up recommended. \par \par On 3/18/2021, follow up left breast mammogram showed probably benign calcifications in anterior left breast without significant change since 8/13/2020. FU in 8/2021 for stability.  \par \par In 12/28/21, bilateral mammo/US showed left breast 2-3:00 4cmfn 1.4cm irregular mass suspicious of malignancy, US core biopsy recommended; grouped heterogenous calcifications at right breast 10:00 anterior depth also suspicious for malignancy, bx recommended, BIRADS4. \par \par Pt underwent left breast 3:00 4cmfn US guided biopsy on 1/31/22 which showed invasive poorly differentiated ductal carcinoma, taylor score 8/9 (3+3+2), invasive tumor at least 0.8cm, microcalcifications present, no LVI.  ER negative <1%, [place] negative <1%, HER2 2+ IHC, CISH pending. \par \par MRI breast on 2/14/22 showed left breast 3:00 biopsy proven cancer associated with 1.4cm mass enhancement;adjacent contiguous large area of linear non-mass enhancement seen extending from biopsy site into retroareolar/central/posterior left breast up to 9.4cm, 2 site MRI biopsy rec if alter surgical mgt; no suspicious enhancement in right breast, No ALD; [id number].\par \par MRI biopsy on 3/1/22 of both areas showed fibrocystic changes including sclerosing adenosis. \par \par CT C/A/P 2/15/22 showed multiple mildly enlarged mediastinal and hilar LNs, increased in size compared to7/10/2020. \par Same day bone scan negative for osseous metastasis. \par \par PET scan 3/2/22 showed known left breast cancer,adjacent 4.4cm hematoma, indeterminate FDG avid left supraclavicular, mediastinal and hilar LAD new and/or enlarged since 7/10/2020, mediastinal and hilar LNs symmetric distribution suggests a benign etiology; left supraclavicular LN access to US guided biopsy; subcm minimally FDG avid left axillary LN nonspecific.\par \par Left supraclavicular LN US guided FNA was negative for malignant cells on 3/15/22.\par \par Saw Dr. Anmol Sanford pulmonary on 4/11/22. Follow up after surgery to assess as the mediastinal LNs unlikely related to breast cancer. \par \par Sister leukemia dx'd age 40. Mother had multiple myeloma.  Brother stomach cancer age 68.  Daughter with CLL age 30.  Paternal 2nd cousin breast cancer age 49. Paternal 2nd cousin liver cancer age 40 had hepatitis. Family history is negative for cancer of the ovary, endometrium, prostate, pancreatic and melanoma. \par The patient is not of Ashkenazi ethnic background. Never had genetic testing.  \par \par HEALTH MAINTENANCE \par PCP Dr. Maty Nunez\par GI Dr. Guo, colonoscopy 5 years, no polyps, due now\par GYN no pap smears, s/p MARK, no vaginal bleeding or spotting\par DERM Dr. Richards and Dr. Ramsey at [place]; no hx of skin cancer\par MSK h/o arthritis on duloxetine. \par s/p COVID vaccine Pfizer [de-identified] : \par Pt underwent bilateral mastectomies on 4/25/22 with Dr. Leon.\par Left mastectomy showed invasive poorly differentiated ductal carcinoma,[place] 8/9, measures 1.8cm, pT1c, DCIS high nuclear grade with comedo necrosis, solid type, intratumoral and peritumoral foci, margins negative, no LVI,complex sclerosing lesion, previously biopsy site changes,1 lymph node negative.\par Right breast mastectomy showed focal atypical ductal hyperplasia, fibrocystic changes, skin with scar, 1 SLN negative.\par On antibiotics for 10 days, 2nd day today.\par Patient is here to discuss further management. \par Seeing 5/27 for EBUS and biopsy Dr. Sanford.

## 2022-05-25 NOTE — PHYSICAL EXAM
[Fully active, able to carry on all pre-disease performance without restriction] : Status 0 - Fully active, able to carry on all pre-disease performance without restriction [Normal] : affect appropriate [de-identified] : bilateral mastectomies healing well; minimal erythema in right surgical scar

## 2022-05-26 ENCOUNTER — APPOINTMENT (OUTPATIENT)
Dept: INFUSION THERAPY | Facility: HOSPITAL | Age: 62
End: 2022-05-26

## 2022-05-26 ENCOUNTER — APPOINTMENT (OUTPATIENT)
Dept: HEMATOLOGY ONCOLOGY | Facility: CLINIC | Age: 62
End: 2022-05-26

## 2022-05-27 ENCOUNTER — APPOINTMENT (OUTPATIENT)
Dept: PULMONOLOGY | Facility: CLINIC | Age: 62
End: 2022-05-27

## 2022-05-30 PROCEDURE — 86901 BLOOD TYPING SEROLOGIC RH(D): CPT

## 2022-05-30 PROCEDURE — 88341 IMHCHEM/IMCYTCHM EA ADD ANTB: CPT

## 2022-05-30 PROCEDURE — 85025 COMPLETE CBC W/AUTO DIFF WBC: CPT

## 2022-05-30 PROCEDURE — 86900 BLOOD TYPING SEROLOGIC ABO: CPT

## 2022-05-30 PROCEDURE — 88307 TISSUE EXAM BY PATHOLOGIST: CPT

## 2022-05-30 PROCEDURE — 78195 LYMPH SYSTEM IMAGING: CPT

## 2022-05-30 PROCEDURE — 86850 RBC ANTIBODY SCREEN: CPT

## 2022-05-30 PROCEDURE — 36415 COLL VENOUS BLD VENIPUNCTURE: CPT

## 2022-05-30 PROCEDURE — 80048 BASIC METABOLIC PNL TOTAL CA: CPT

## 2022-05-30 PROCEDURE — A9541: CPT

## 2022-05-30 PROCEDURE — 88305 TISSUE EXAM BY PATHOLOGIST: CPT

## 2022-05-30 PROCEDURE — 88333 PATH CONSLTJ SURG CYTO XM 1: CPT

## 2022-05-31 ENCOUNTER — TRANSCRIPTION ENCOUNTER (OUTPATIENT)
Age: 62
End: 2022-05-31

## 2022-05-31 ENCOUNTER — APPOINTMENT (OUTPATIENT)
Dept: SURGICAL ONCOLOGY | Facility: CLINIC | Age: 62
End: 2022-05-31
Payer: MEDICAID

## 2022-05-31 ENCOUNTER — OUTPATIENT (OUTPATIENT)
Dept: OUTPATIENT SERVICES | Facility: HOSPITAL | Age: 62
LOS: 1 days | End: 2022-05-31

## 2022-05-31 VITALS
DIASTOLIC BLOOD PRESSURE: 74 MMHG | RESPIRATION RATE: 14 BRPM | OXYGEN SATURATION: 98 % | HEART RATE: 66 BPM | HEIGHT: 62 IN | WEIGHT: 145.95 LBS | SYSTOLIC BLOOD PRESSURE: 140 MMHG | TEMPERATURE: 98 F

## 2022-05-31 DIAGNOSIS — Z92.21 PERSONAL HISTORY OF ANTINEOPLASTIC CHEMOTHERAPY: Chronic | ICD-10-CM

## 2022-05-31 DIAGNOSIS — Z98.890 OTHER SPECIFIED POSTPROCEDURAL STATES: Chronic | ICD-10-CM

## 2022-05-31 DIAGNOSIS — R59.0 LOCALIZED ENLARGED LYMPH NODES: ICD-10-CM

## 2022-05-31 DIAGNOSIS — R59.9 ENLARGED LYMPH NODES, UNSPECIFIED: ICD-10-CM

## 2022-05-31 DIAGNOSIS — Z90.710 ACQUIRED ABSENCE OF BOTH CERVIX AND UTERUS: Chronic | ICD-10-CM

## 2022-05-31 LAB
ALBUMIN SERPL ELPH-MCNC: 4.5 G/DL — SIGNIFICANT CHANGE UP (ref 3.3–5)
ALP SERPL-CCNC: 103 U/L — SIGNIFICANT CHANGE UP (ref 40–120)
ALT FLD-CCNC: 15 U/L — SIGNIFICANT CHANGE UP (ref 4–33)
ANION GAP SERPL CALC-SCNC: 11 MMOL/L — SIGNIFICANT CHANGE UP (ref 7–14)
AST SERPL-CCNC: 18 U/L — SIGNIFICANT CHANGE UP (ref 4–32)
BILIRUB SERPL-MCNC: <0.2 MG/DL — SIGNIFICANT CHANGE UP (ref 0.2–1.2)
BUN SERPL-MCNC: 12 MG/DL — SIGNIFICANT CHANGE UP (ref 7–23)
CALCIUM SERPL-MCNC: 9.4 MG/DL — SIGNIFICANT CHANGE UP (ref 8.4–10.5)
CHLORIDE SERPL-SCNC: 96 MMOL/L — LOW (ref 98–107)
CO2 SERPL-SCNC: 25 MMOL/L — SIGNIFICANT CHANGE UP (ref 22–31)
CREAT SERPL-MCNC: 0.58 MG/DL — SIGNIFICANT CHANGE UP (ref 0.5–1.3)
EGFR: 102 ML/MIN/1.73M2 — SIGNIFICANT CHANGE UP
GLUCOSE SERPL-MCNC: 105 MG/DL — HIGH (ref 70–99)
HCT VFR BLD CALC: 34.4 % — LOW (ref 34.5–45)
HGB BLD-MCNC: 10.7 G/DL — LOW (ref 11.5–15.5)
MCHC RBC-ENTMCNC: 27.4 PG — SIGNIFICANT CHANGE UP (ref 27–34)
MCHC RBC-ENTMCNC: 31.1 GM/DL — LOW (ref 32–36)
MCV RBC AUTO: 88.2 FL — SIGNIFICANT CHANGE UP (ref 80–100)
NRBC # BLD: 0 /100 WBCS — SIGNIFICANT CHANGE UP
NRBC # FLD: 0 K/UL — SIGNIFICANT CHANGE UP
PLATELET # BLD AUTO: 323 K/UL — SIGNIFICANT CHANGE UP (ref 150–400)
POTASSIUM SERPL-MCNC: 4.3 MMOL/L — SIGNIFICANT CHANGE UP (ref 3.5–5.3)
POTASSIUM SERPL-SCNC: 4.3 MMOL/L — SIGNIFICANT CHANGE UP (ref 3.5–5.3)
PROT SERPL-MCNC: 7.7 G/DL — SIGNIFICANT CHANGE UP (ref 6–8.3)
RBC # BLD: 3.9 M/UL — SIGNIFICANT CHANGE UP (ref 3.8–5.2)
RBC # FLD: 13.9 % — SIGNIFICANT CHANGE UP (ref 10.3–14.5)
SODIUM SERPL-SCNC: 132 MMOL/L — LOW (ref 135–145)
WBC # BLD: 4.96 K/UL — SIGNIFICANT CHANGE UP (ref 3.8–10.5)
WBC # FLD AUTO: 4.96 K/UL — SIGNIFICANT CHANGE UP (ref 3.8–10.5)

## 2022-05-31 PROCEDURE — 99024 POSTOP FOLLOW-UP VISIT: CPT

## 2022-05-31 RX ORDER — SODIUM CHLORIDE 9 MG/ML
1000 INJECTION, SOLUTION INTRAVENOUS
Refills: 0 | Status: DISCONTINUED | OUTPATIENT
Start: 2022-06-01 | End: 2022-06-15

## 2022-05-31 RX ORDER — DEXLANSOPRAZOLE 30 MG/1
1 CAPSULE, DELAYED RELEASE ORAL
Qty: 0 | Refills: 0 | DISCHARGE

## 2022-05-31 NOTE — H&P PST ADULT - OTHER CARE PROVIDERS
Dr. Gary Goldberg  cardiologist [phone], Dr. Abernathy [facility] Dr. Gary Goldberg  cardiologist [phone], Dr. Abernathy oncology [facility]

## 2022-05-31 NOTE — H&P PST ADULT - PROBLEM SELECTOR PLAN 1
Pt scheduled for flexible endobronchial US, bronchoscopy on 6/1/2022.  labs done results pending, ekg in chart.  Pt went for preop covid testing.  Preop teaching done, pt able to verbalize understanding.   medications day or procedure- omeprazole  anesthesia- sleep apnea, post op nausea   pst request   echo 2022 [phone]  stress 2022 [phone]  pft in chart

## 2022-05-31 NOTE — H&P PST ADULT - NSICDXPASTMEDICALHX_GEN_ALL_CORE_FT
PAST MEDICAL HISTORY:  Breast cancer right, s/p chemo and radiation 2011, lymph nodes removed no bp or lab draw right arm    GERD (gastroesophageal reflux disease)     HLD (hyperlipidemia)     HTN (hypertension)     Localized enlarged lymph nodes     ELVIN (obstructive sleep apnea) not using CPAP    Osteoarthritis     Varicose veins of both lower extremities

## 2022-05-31 NOTE — HISTORY OF PRESENT ILLNESS
[de-identified] : hematoma resolving slowly\par mediport placed by radiology\par \par Plan:\par chemo to start next week\par RTO 2 weeks

## 2022-05-31 NOTE — H&P PST ADULT - HISTORY OF PRESENT ILLNESS
61y/o female scheduled for flexible endobronchial ultrasound bronchoscopy on 6/1/2022.  Pt states, "dx with right breast cancer 2011 tx with chemotherapy and radiation, lumpectomy, lymph nodes removed, dx left with breast cancer 3/2022, s/p bilateral mastectomy 4/42022.   On pet scan showed pulmonary mediastinal enlarged lymph nodes, now having further evaluation."    No labs or bp in right arm

## 2022-05-31 NOTE — ASU PATIENT PROFILE, ADULT - FALL HARM RISK - UNIVERSAL INTERVENTIONS
Bed in lowest position, wheels locked, appropriate side rails in place/Call bell, personal items and telephone in reach/Instruct patient to call for assistance before getting out of bed or chair/Non-slip footwear when patient is out of bed/[place] to call system/Physically safe environment - no spills, clutter or unnecessary equipment/Purposeful Proactive Rounding/Room/bathroom lighting operational, light cord in reach

## 2022-05-31 NOTE — H&P PST ADULT - NSICDXPASTSURGICALHX_GEN_ALL_CORE_FT
PAST SURGICAL HISTORY:  Appendicitis, Acute appendectomy    H/O blepharoplasty     History of chemotherapy right chest mediport    History of hysterectomy excessive bleeding    History of varicose vein ligation and stripping     Lump in Breast lumpectomy, right 2011    Tonsillitis

## 2022-05-31 NOTE — H&P PST ADULT - RS GEN PE MLT RESP DETAILS PC
normal/breath sounds equal/good air movement/respirations non-labored/clear to auscultation bilaterally/no chest wall tenderness/no intercostal retractions/no rales/no rhonchi/no subcutaneous emphysema/no wheezes

## 2022-05-31 NOTE — H&P PST ADULT - VENOUS THROMBOEMBOLISM CURRENT STATUS
(1) swollen legs (current)/(1) varicose veins/(1) other risk factor (includes escalating BMI, pack-years of smoking, diabetes requiring insulin, chemotherapy, female gender and length of surgery)/(2) malignancy (present or previous) (1) varicose veins/(1) other risk factor (includes escalating BMI, pack-years of smoking, diabetes requiring insulin, chemotherapy, female gender and length of surgery)/(2) malignancy (present or previous)

## 2022-06-01 ENCOUNTER — RESULT REVIEW (OUTPATIENT)
Age: 62
End: 2022-06-01

## 2022-06-01 ENCOUNTER — TRANSCRIPTION ENCOUNTER (OUTPATIENT)
Age: 62
End: 2022-06-01

## 2022-06-01 ENCOUNTER — OUTPATIENT (OUTPATIENT)
Dept: OUTPATIENT SERVICES | Facility: HOSPITAL | Age: 62
LOS: 1 days | Discharge: HOME | End: 2022-06-01
Payer: MEDICAID

## 2022-06-01 ENCOUNTER — APPOINTMENT (OUTPATIENT)
Dept: PULMONOLOGY | Facility: HOSPITAL | Age: 62
End: 2022-06-01

## 2022-06-01 VITALS
HEART RATE: 67 BPM | RESPIRATION RATE: 18 BRPM | TEMPERATURE: 97 F | SYSTOLIC BLOOD PRESSURE: 149 MMHG | DIASTOLIC BLOOD PRESSURE: 72 MMHG | OXYGEN SATURATION: 97 %

## 2022-06-01 VITALS
RESPIRATION RATE: 16 BRPM | WEIGHT: 145.95 LBS | DIASTOLIC BLOOD PRESSURE: 57 MMHG | TEMPERATURE: 98 F | OXYGEN SATURATION: 100 % | HEART RATE: 65 BPM | HEIGHT: 62 IN | SYSTOLIC BLOOD PRESSURE: 134 MMHG

## 2022-06-01 DIAGNOSIS — Z90.710 ACQUIRED ABSENCE OF BOTH CERVIX AND UTERUS: Chronic | ICD-10-CM

## 2022-06-01 DIAGNOSIS — Z92.21 PERSONAL HISTORY OF ANTINEOPLASTIC CHEMOTHERAPY: Chronic | ICD-10-CM

## 2022-06-01 DIAGNOSIS — Z98.890 OTHER SPECIFIED POSTPROCEDURAL STATES: Chronic | ICD-10-CM

## 2022-06-01 DIAGNOSIS — R59.0 LOCALIZED ENLARGED LYMPH NODES: ICD-10-CM

## 2022-06-01 LAB
GRAM STN FLD: SIGNIFICANT CHANGE UP
GRAM STN FLD: SIGNIFICANT CHANGE UP
NIGHT BLUE STAIN TISS: SIGNIFICANT CHANGE UP
NIGHT BLUE STAIN TISS: SIGNIFICANT CHANGE UP
SPECIMEN SOURCE: SIGNIFICANT CHANGE UP

## 2022-06-01 PROCEDURE — 71045 X-RAY EXAM CHEST 1 VIEW: CPT | Mod: 26

## 2022-06-01 PROCEDURE — 31645 BRNCHSC W/THER ASPIR 1ST: CPT | Mod: GC

## 2022-06-01 PROCEDURE — 88173 CYTOPATH EVAL FNA REPORT: CPT | Mod: 26

## 2022-06-01 PROCEDURE — 88305 TISSUE EXAM BY PATHOLOGIST: CPT | Mod: 26

## 2022-06-01 PROCEDURE — 88112 CYTOPATH CELL ENHANCE TECH: CPT | Mod: 26,59

## 2022-06-01 PROCEDURE — 31652 BRONCH EBUS SAMPLNG 1/2 NODE: CPT | Mod: GC

## 2022-06-01 PROCEDURE — 31624 DX BRONCHOSCOPE/LAVAGE: CPT | Mod: GC

## 2022-06-01 PROCEDURE — 88312 SPECIAL STAINS GROUP 1: CPT | Mod: 26

## 2022-06-01 RX ORDER — SODIUM CHLORIDE 9 MG/ML
1000 INJECTION, SOLUTION INTRAVENOUS
Refills: 0 | Status: DISCONTINUED | OUTPATIENT
Start: 2022-06-01 | End: 2022-06-15

## 2022-06-01 RX ORDER — HYDROMORPHONE HYDROCHLORIDE 2 MG/ML
0.5 INJECTION INTRAMUSCULAR; INTRAVENOUS; SUBCUTANEOUS
Refills: 0 | Status: DISCONTINUED | OUTPATIENT
Start: 2022-06-01 | End: 2022-06-01

## 2022-06-01 NOTE — ASU DISCHARGE PLAN (ADULT/PEDIATRIC) - ASU DC SPECIAL INSTRUCTIONSFT
Patients may experience fever on the night of the procedure.  If your fever is persistent, please contact your pulmonologist. Patient's may cough up specks of blood for 1-2 days after the procedure.  If the bleeding is large or persistent please or you have sudden chest pain or trouble breathing, please call 911 and have an ambulance take you to the nearest emergency room and contact your pulmonologist when you arrive.

## 2022-06-01 NOTE — ASU DISCHARGE PLAN (ADULT/PEDIATRIC) - CARE PROVIDER_API CALL
Anmol Sanford)  Critical Care Medicine; Internal Medicine; Pulmonary Disease  [address] [place] [address]  Phone: [phone]  Fax: [phone]  Follow Up Time:

## 2022-06-01 NOTE — BRIEF OPERATIVE NOTE - NSICDXBRIEFPROCEDURE_GEN_ALL_CORE_FT
PROCEDURES:  Bronchoscopy, with diagnostic EBUS, with biopsy or washing if indicated 01-Jun-2022 14:18:34  Marcus Garcia

## 2022-06-01 NOTE — ASU DISCHARGE PLAN (ADULT/PEDIATRIC) - FOLLOW UP APPOINTMENTS
may also call Recovery Room (PACU) 24/7 @ [phone]/[facility]

## 2022-06-01 NOTE — ASU DISCHARGE PLAN (ADULT/PEDIATRIC) - NS MD DC FALL RISK RISK
For information on Fall & Injury Prevention, visit: [email or web address] OR  [email or web address] OR  https://www.cdc.gov/steadi/patient.html

## 2022-06-01 NOTE — ASU DISCHARGE PLAN (ADULT/PEDIATRIC) - CALL YOUR DOCTOR IF YOU HAVE ANY OF THE FOLLOWING:
Bleeding that does not stop/Swelling that gets worse/Pain not relieved by Medications/Fever greater than (need to indicate Fahrenheit or Celsius)/Numbness, tingling, color or temperature change to extremity/Nausea and vomiting that does not stop/Unable to urinate/Excessive diarrhea/Inability to tolerate liquids or foods

## 2022-06-02 ENCOUNTER — APPOINTMENT (OUTPATIENT)
Dept: INFUSION THERAPY | Facility: HOSPITAL | Age: 62
End: 2022-06-02

## 2022-06-02 ENCOUNTER — APPOINTMENT (OUTPATIENT)
Dept: HEMATOLOGY ONCOLOGY | Facility: CLINIC | Age: 62
End: 2022-06-02

## 2022-06-02 ENCOUNTER — OUTPATIENT (OUTPATIENT)
Dept: OUTPATIENT SERVICES | Facility: HOSPITAL | Age: 62
LOS: 1 days | Discharge: HOME | End: 2022-06-02
Payer: MEDICAID

## 2022-06-02 DIAGNOSIS — Z98.890 OTHER SPECIFIED POSTPROCEDURAL STATES: Chronic | ICD-10-CM

## 2022-06-02 DIAGNOSIS — C50.919 MALIGNANT NEOPLASM OF UNSPECIFIED SITE OF UNSPECIFIED FEMALE BREAST: ICD-10-CM

## 2022-06-02 DIAGNOSIS — Z90.710 ACQUIRED ABSENCE OF BOTH CERVIX AND UTERUS: Chronic | ICD-10-CM

## 2022-06-02 DIAGNOSIS — Z92.21 PERSONAL HISTORY OF ANTINEOPLASTIC CHEMOTHERAPY: Chronic | ICD-10-CM

## 2022-06-03 LAB
CULTURE RESULTS: SIGNIFICANT CHANGE UP
SPECIMEN SOURCE: SIGNIFICANT CHANGE UP

## 2022-06-04 PROBLEM — R59.0 LOCALIZED ENLARGED LYMPH NODES: Chronic | Status: ACTIVE | Noted: 2022-05-31

## 2022-06-04 PROBLEM — C50.919 MALIGNANT NEOPLASM OF UNSPECIFIED SITE OF UNSPECIFIED FEMALE BREAST: Chronic | Status: ACTIVE | Noted: 2017-07-19

## 2022-06-06 LAB
CULTURE RESULTS: SIGNIFICANT CHANGE UP
SPECIMEN SOURCE: SIGNIFICANT CHANGE UP

## 2022-06-07 ENCOUNTER — APPOINTMENT (OUTPATIENT)
Dept: SURGICAL ONCOLOGY | Facility: CLINIC | Age: 62
End: 2022-06-07

## 2022-06-08 LAB — NON-GYNECOLOGICAL CYTOLOGY STUDY: SIGNIFICANT CHANGE UP

## 2022-06-09 ENCOUNTER — APPOINTMENT (OUTPATIENT)
Dept: HEMATOLOGY ONCOLOGY | Facility: CLINIC | Age: 62
End: 2022-06-09
Payer: MEDICAID

## 2022-06-09 ENCOUNTER — RESULT REVIEW (OUTPATIENT)
Age: 62
End: 2022-06-09

## 2022-06-09 ENCOUNTER — NON-APPOINTMENT (OUTPATIENT)
Age: 62
End: 2022-06-09

## 2022-06-09 ENCOUNTER — APPOINTMENT (OUTPATIENT)
Dept: HEMATOLOGY ONCOLOGY | Facility: CLINIC | Age: 62
End: 2022-06-09

## 2022-06-09 ENCOUNTER — APPOINTMENT (OUTPATIENT)
Dept: INFUSION THERAPY | Facility: HOSPITAL | Age: 62
End: 2022-06-09

## 2022-06-09 VITALS
HEART RATE: 65 BPM | WEIGHT: 146.61 LBS | HEIGHT: 62.01 IN | DIASTOLIC BLOOD PRESSURE: 70 MMHG | SYSTOLIC BLOOD PRESSURE: 121 MMHG | BODY MASS INDEX: 26.64 KG/M2 | OXYGEN SATURATION: 99 % | RESPIRATION RATE: 16 BRPM | TEMPERATURE: 97.4 F

## 2022-06-09 DIAGNOSIS — M79.2 NEURALGIA AND NEURITIS, UNSPECIFIED: ICD-10-CM

## 2022-06-09 LAB
BASOPHILS # BLD AUTO: 0.03 K/UL — SIGNIFICANT CHANGE UP (ref 0–0.2)
BASOPHILS NFR BLD AUTO: 0.7 % — SIGNIFICANT CHANGE UP (ref 0–2)
EOSINOPHIL # BLD AUTO: 0.27 K/UL — SIGNIFICANT CHANGE UP (ref 0–0.5)
EOSINOPHIL NFR BLD AUTO: 5.9 % — SIGNIFICANT CHANGE UP (ref 0–6)
HCT VFR BLD CALC: 35.1 % — SIGNIFICANT CHANGE UP (ref 34.5–45)
HGB BLD-MCNC: 11.4 G/DL — LOW (ref 11.5–15.5)
IMM GRANULOCYTES NFR BLD AUTO: 0.4 % — SIGNIFICANT CHANGE UP (ref 0–1.5)
LYMPHOCYTES # BLD AUTO: 1.46 K/UL — SIGNIFICANT CHANGE UP (ref 1–3.3)
LYMPHOCYTES # BLD AUTO: 32.2 % — SIGNIFICANT CHANGE UP (ref 13–44)
MCHC RBC-ENTMCNC: 27.6 PG — SIGNIFICANT CHANGE UP (ref 27–34)
MCHC RBC-ENTMCNC: 32.5 G/DL — SIGNIFICANT CHANGE UP (ref 32–36)
MCV RBC AUTO: 85 FL — SIGNIFICANT CHANGE UP (ref 80–100)
MONOCYTES # BLD AUTO: 0.57 K/UL — SIGNIFICANT CHANGE UP (ref 0–0.9)
MONOCYTES NFR BLD AUTO: 12.6 % — SIGNIFICANT CHANGE UP (ref 2–14)
NEUTROPHILS # BLD AUTO: 2.19 K/UL — SIGNIFICANT CHANGE UP (ref 1.8–7.4)
NEUTROPHILS NFR BLD AUTO: 48.2 % — SIGNIFICANT CHANGE UP (ref 43–77)
NRBC # BLD: 0 /100 WBCS — SIGNIFICANT CHANGE UP (ref 0–0)
PLATELET # BLD AUTO: 273 K/UL — SIGNIFICANT CHANGE UP (ref 150–400)
RBC # BLD: 4.13 M/UL — SIGNIFICANT CHANGE UP (ref 3.8–5.2)
RBC # FLD: 13.7 % — SIGNIFICANT CHANGE UP (ref 10.3–14.5)
WBC # BLD: 4.54 K/UL — SIGNIFICANT CHANGE UP (ref 3.8–10.5)
WBC # FLD AUTO: 4.54 K/UL — SIGNIFICANT CHANGE UP (ref 3.8–10.5)

## 2022-06-09 PROCEDURE — 99212 OFFICE O/P EST SF 10 MIN: CPT

## 2022-06-09 PROCEDURE — 99214 OFFICE O/P EST MOD 30 MIN: CPT

## 2022-06-09 PROCEDURE — 93010 ELECTROCARDIOGRAM REPORT: CPT

## 2022-06-09 RX ORDER — OMEPRAZOLE 20 MG/1
20 CAPSULE, DELAYED RELEASE ORAL DAILY
Qty: 30 | Refills: 1 | Status: DISCONTINUED | COMMUNITY
Start: 2022-06-02 | End: 2022-06-09

## 2022-06-09 NOTE — HISTORY OF PRESENT ILLNESS
[de-identified] : In 2011, pt was diagnosed with T2N0M0 (2.2cm), stage IIA RIGHT breast triple negative invasive ductal carcinoma with Taylor score 8 to 9.  She underwent lumpectomy Dr. Keyon Amezquita and then received adjuvant chemotherapy with taxotere and cytoxan for 4 cycles.  She then received IMRT with 5000cGy in 25 fractions followed by boost to the cavity of 1000cGy in 5 fractions 11/9/11-12/21/11 with Dr. Veena Walls. \par \par Pt had benign right breast stereotactic biopsy for calcifications in 7/26/2012 and s/p benign left breast US guided core biopsy 9/7/2018 (4-5:00).\par \par In 8/13/2020, screening mammogram/US showed calcifications in left mid breast as well as scattered and occasionally loosely grouped calcifications of central breast, 6mos follow up recommended. \par \par On 3/18/2021, follow up left breast mammogram showed probably benign calcifications in anterior left breast without significant change since 8/13/2020. FU in 8/2021 for stability.  \par \par In 12/28/21, bilateral mammo/US showed left breast 2-3:00 4cmfn 1.4cm irregular mass suspicious of malignancy, US core biopsy recommended; grouped heterogenous calcifications at right breast 10:00 anterior depth also suspicious for malignancy, bx recommended, BIRADS4. \par \par Pt underwent left breast 3:00 4cmfn US guided biopsy on 1/31/22 which showed invasive poorly differentiated ductal carcinoma, taylor score 8/9 (3+3+2), invasive tumor at least 0.8cm, microcalcifications present, no LVI.  ER negative <1%, [place] negative <1%, HER2 2+ IHC, CISH pending. \par \par MRI breast on 2/14/22 showed leftbreast 3:00 biopsy proven cancer associatedwith 1.4cmmass enhancement;adjacent continguous large area oflinerar nonmassenhancement seen extending from biopsysite into retroareolar/central/posterior left breast upto9.4cm,2 site MRI biopsy rec if alter surgical mgt;nosuspiciousenhancementin rightbrast,No ALD; [id number].\par \par MRI biopsyon 3/1/22 of both areas showedfibrocystic changes including sclerosing adenosis \par \par CT C/A/P 2/15/22 showedmultiple mildly enlargedmediastinal andhilarLNs,increasedn size compaerdto7/10/2020. \par Same day bone scan negativefor osseous metastasis. \par \par PET scan 3/2/22 showed knownleftbreast cancer,adjacent 4.4cm hematoma, indeterminate FDG avid leftsupraclavicular, mediastinal and hilar LAD new and/or enhlargedsince7/10/2020, mediastinal and hilar LNs symmetric distrubution suggests abenign etiology;leftsupraclavicularLNaccess toUS guidedbiopsy; subcm minimallyFDGavidleftaxillary LN nonspecific,,maybebiopsies. \par \par Leftsupraclavicular LN US guided FNA was negatibve formalignantcells. on 3/15/22 showed\par \par Saw Dr. Anmol Sanford pulmonary on4/11/22. Followupafter surgery to assessas the mediastinal LNsunlikelyelatedtobreastcancer. \par \par 4/25/2022 - Dr. coreas performed lumpectomy: final surgical pathology as follows:\par 1.8 cm poorly differentiated IDC with two negative SN; ER 0%; [place] 0%; HER 2 + by FISH\par \par Sister leukemia dx'd age 40. Mother had multiple myeloma.  Brother stomach cancer age 68.  Daughter with CLL age 30.  Paternal 2nd cousin breast cancer age 49. Paternal 2nd cousin liver cancer age 40 had hepatitis. Family history is negative for cancer of the ovary, endometrium, prostate, pancreatic and melanoma. \par The patient is not of Ashkenazi ethnic background. Never had genetic testing.  \par \par HEALTH MAINTENANCE \par PCP Dr. Maty Nunez\par GI Dr. Guo, colonoscopy 5 years, no polyps, due now\par GYN no pap smears, s/p MARK, no vaginal bleeding or spotting\par DERM Dr. Richards and Dr. Ramsey at [place]; no hx of skin cancer\par MSK h/o arthritis on duloxetine. \par s/p COVID vaccine Pfizer [de-identified] : 6/9/2022\par Patient presents today to begin  herceptin + weekly taxol \par Pt underwent bilateral mastectomies on 4/25/22 with Dr. Leon.\par s/p EB w/ Dr. Sanford - biopsy negative for malignancy; + granulomatous inflammation\par Patient denies any SOB, CP, abdominal pain, bone pain, headache, or unexplained weight loss\par c/o left lower arm numbness/tingling since surgery; no associated weakness. \par + GERD on omeprazole am and pepcid qhs \par Port placed \par Echo 5/2022 EF 63%

## 2022-06-09 NOTE — ASSESSMENT
[FreeTextEntry1] : ARI LOYA  is a 62 year old female with history of T2N0M0 (2.2cm), stage IIA RIGHT breast triple negative invasive ductal carcinoma, s/p adjuvant TC chemotherapy and RT.  She is here for newly diagnosed contralateral left invasive poorly differentiated ductal carcinoma, cT1cN0, ER negative <1%, [place] negative <1%, HER2 positive. \par \par -patient to begin  Taxol x12 + weekly Herceptin x 1 year and herceptin treatment. We reviewed side effects including but not limited to bone marrow suppression, alopecia, fatigue, infusion reaction, body aches, nausea, vomiting, mucositis, neuropathy, liver/pulmonary toxicity, cardiomyopathy and rare risk of hematologic malignancy. \par - 5/27/2022 - EBUS for pulmonary findings - benign grandulomatous changes\par - Patient's CBC was reviewed and is acceptable to proceed w/ treatment today as planned.\par - limited ROM s/p mastectomy - recommend PT\par -patient was given the time to ask questions which I answered to the best of my ability and to her apparent satisfaction. I encouraged her to call me with any additional questions. \par - f/up 2 weeks\par \par GERD\par - continue omeprazole am and pepcid q hs.

## 2022-06-09 NOTE — PHYSICAL EXAM
[Restricted in physically strenuous activity but ambulatory and able to carry out work of a light or sedentary nature] : Status 1- Restricted in physically strenuous activity but ambulatory and able to carry out work of a light or sedentary nature, e.g., light house work, office work [Normal] : no peripheral adenopathy appreciated [de-identified] : s/p b/l mastectomy without reconstruction with restricted range of motion of b/l extremities

## 2022-06-10 DIAGNOSIS — Z51.11 ENCOUNTER FOR ANTINEOPLASTIC CHEMOTHERAPY: ICD-10-CM

## 2022-06-10 DIAGNOSIS — R11.2 NAUSEA WITH VOMITING, UNSPECIFIED: ICD-10-CM

## 2022-06-10 LAB
ALBUMIN SERPL ELPH-MCNC: 4.3 G/DL — SIGNIFICANT CHANGE UP (ref 3.3–5)
ALP SERPL-CCNC: 93 U/L — SIGNIFICANT CHANGE UP (ref 40–120)
ALT FLD-CCNC: 14 U/L — SIGNIFICANT CHANGE UP (ref 10–45)
ANION GAP SERPL CALC-SCNC: 14 MMOL/L — SIGNIFICANT CHANGE UP (ref 5–17)
AST SERPL-CCNC: 14 U/L — SIGNIFICANT CHANGE UP (ref 10–40)
BILIRUB SERPL-MCNC: <0.2 MG/DL — SIGNIFICANT CHANGE UP (ref 0.2–1.2)
BUN SERPL-MCNC: 12 MG/DL — SIGNIFICANT CHANGE UP (ref 7–23)
CALCIUM SERPL-MCNC: 8.9 MG/DL — SIGNIFICANT CHANGE UP (ref 8.4–10.5)
CHLORIDE SERPL-SCNC: 101 MMOL/L — SIGNIFICANT CHANGE UP (ref 96–108)
CO2 SERPL-SCNC: 23 MMOL/L — SIGNIFICANT CHANGE UP (ref 22–31)
CREAT SERPL-MCNC: 0.51 MG/DL — SIGNIFICANT CHANGE UP (ref 0.5–1.3)
EGFR: 105 ML/MIN/1.73M2 — SIGNIFICANT CHANGE UP
GLUCOSE SERPL-MCNC: 145 MG/DL — HIGH (ref 70–99)
POTASSIUM SERPL-MCNC: 4 MMOL/L — SIGNIFICANT CHANGE UP (ref 3.5–5.3)
POTASSIUM SERPL-SCNC: 4 MMOL/L — SIGNIFICANT CHANGE UP (ref 3.5–5.3)
PROT SERPL-MCNC: 6.6 G/DL — SIGNIFICANT CHANGE UP (ref 6–8.3)
SODIUM SERPL-SCNC: 137 MMOL/L — SIGNIFICANT CHANGE UP (ref 135–145)

## 2022-06-14 ENCOUNTER — APPOINTMENT (OUTPATIENT)
Dept: SURGICAL ONCOLOGY | Facility: CLINIC | Age: 62
End: 2022-06-14

## 2022-06-14 VITALS
WEIGHT: 146 LBS | DIASTOLIC BLOOD PRESSURE: 68 MMHG | BODY MASS INDEX: 26.87 KG/M2 | HEART RATE: 81 BPM | OXYGEN SATURATION: 97 % | HEIGHT: 62 IN | SYSTOLIC BLOOD PRESSURE: 107 MMHG

## 2022-06-14 VITALS — TEMPERATURE: 97.1 F

## 2022-06-14 PROCEDURE — 99024 POSTOP FOLLOW-UP VISIT: CPT

## 2022-06-14 NOTE — ASSESSMENT
[FreeTextEntry1] : IMP: \par She is s/p Bilateral mastectomies with left axillary sentinel node biopsy on 4/25/22.  Pathology revealed:\par 1) RIGHT Breast: focal ADH\par 2) LEFT Breast: Invasive poorly differentiated ductal carcinoma (1.8 cm), DCIS, 2 benign left axillary lymph nodes (one of which was a sentinel node), negative margins.  T1cN0, ER-MD-HER2 equivocal (POSITIVE by in situ hybridization).\par \par Adjvant chemotherapy started\par Hematoma re-absorbing\par \par \par PLAN:\par RTO 1 month to check hematoma\par

## 2022-06-14 NOTE — HISTORY OF PRESENT ILLNESS
[de-identified] : ARI LOYA  is a 62 year old female here for a postop visit.\par chemotherapy started last week\par \par In , pt was diagnosed with T2N0M0 (2.2cm), stage IIA RIGHT breast triple negative invasive ductal carcinoma with Taylor score 8 to 9.  She underwent lumpectomy Dr. Keyon Amezquita and then received adjuvant chemotherapy with taxotere and cytoxan for 4 cycles.  She then received IMRT with 5000cGy in 25 fractions followed by boost to the cavity of 1000cGy in 5 fractions 11-11 with Dr. Veena Walls. \par \par Pt had benign right breast stereotactic biopsy for calcifications in 2012 and s/p benign left breast US guided core biopsy 2018 (4-5:00).\par \par In 2020, screening mammogram/US showed calcifications in left mid breast as well as scattered and occasionally loosely grouped calcifications of central breast, 6mos follow up recommended. \par \par On 3/18/2021, follow up left breast mammogram showed probably benign calcifications in anterior left breast without significant change since 2020. FU in 2021 for stability.  \par \par In 21, bilateral mammo/US showed left breast 2-3:00 4cmfn 1.4cm irregular mass suspicious of malignancy, US core biopsy recommended; grouped heterogenous calcifications at right breast 10:00 anterior depth also suspicious for malignancy, bx recommended, BIRADS4. \par \par Pt underwent left breast 3:00 4cmfn US guided biopsy on 22 which showed invasive poorly differentiated ductal carcinoma, taylor score 8/9 (3+3+2), invasive tumor at least 0.8cm, microcalcifications present, no LVI.  ER-/MD-, HER2 equivocal pending  CISH. \par \par MRI breast 22: \par 1. left breast 3:00 axis biopsy proven cancer with associated 1.4 cm mass enhancement. \par 2. Additional adjacent contiguous large area of linear non mass enhancement seen extending from the biopsy site into retroareolar/central/posterior left breast, up to 9.4 cm in AP dimension. \par 3. No suspicious enhancement in the right breast \par 4. No lymphadenopathy \par (BIRADS 4, suspicious findings)\par \par CT C/A/P 2/15/22: Multiple mildly enlarged mediastinal and hilar nodes, increased in size compared to 7/10/2020. Underdistended urinary bladder with questionable wall thickening. \par \par Bone scan 2/15/22:  No scan evidence of osseous metastasis. Degenerative disease in the spine and major joints \par \par Left breast biopsy 3/1/22: Fibrocystic change including apocrine adenosis and blood clot and focal microcalcifications\par Right breast biopsy 3/1/22: Proliferative fibrocystic change including sclerosing adenosis with microcalcifications. \par \par PET/CT 3/2/22: \par 1. FDG avd focus in the left outer breast corresponds to the pt recent biopsy proven invasive poorly differentiated ductal carcinoma. A 4.4 x 2.3 cm left central breast hematoma with adjacent postprocedural changes as described above. \par 2. Indeterminate FDG avid left supraclavicular, mediastinal and hilar lymphadenopathy, new and/or enlarged since 7/10/20, The symmetric distribution of lymph nodes in the mediastinum and hilar regions suggests a benign etiology. Left supraclavicular lymph  node is accessible to an US guise percutaneous needle biopsy\par 3. A subcentimeter, minimally FDG avid left axillary lymph node is nonspecific. \par \par Left supraclavicular lymph node FNA 3/15/22: Negative for malignant cells\par \par 3/24/2022: MRI show an extensive area of enhancement area the biopsy site, CT (CAP) shows mediastinal adenopathy. Subsequent biopsies of the breast and the supraclavicular lymph node were negative. BRCA: no significant mutation. Plan for bilateral mastectomies & axillary sentinel node biopsy\par \par h/o arthritis, on duloxetine. \par \par Risk factors: Prior breast disease: as above.  Menarche: 14. Postmenopausal,  after hysterectomy.  4 pregnancies, 5 miscarriages, 4 living children. \par Age of first pregnancy 18.   Breast feedin year each child. Oral contraceptive use: yes, 5 years. Other hormone exposure: None. \par Sister leukemia dx'd age 40. Mother had multiple myeloma.  Brother stomach cancer age 68.  Daughter with CLL age 30.  Paternal 2nd cousin breast cancer age 49. Paternal 2nd cousin liver cancer age 40 had hepatitis. Family history is negative for cancer of the ovary, endometrium, prostate, pancreatic and melanoma. \par The patient is not of Ashkenazi ethnic background. Never had genetic testing.  \par \par HEALTH MAINTENANCE \par PCP Dr. Maty Nunez\par GI Dr. Guo, colonoscopy 5 years, no polyps, due now\par GYN no pap smears, s/p MARK, no vaginal bleeding or spotting\par DERM Dr. Richards and Dr. Ramsey at [place]; no hx of skin cancer\par s/p COVID vaccine Pfizer\par \par INTERVAL HISTORY:\par \par She is s/p Bilateral mastectomies with left axillary sentinel node biopsy on 22.  Pathology revealed:\par 1) RIGHT Breast: focal ADH\par 2) LEFT Breast: Invasive poorly differentiated ductal carcinoma (1.8 cm), DCIS, 2 benign left axillary lymph nodes (one of which was a sentinel node), negative margins.  T1cN0, ER-MD-HER2 equivocal (POSITIVE by in situ hybridization).\par \par 5/3/22-  Patient seen for initial postop visit.  Left drains clotted w/ seroma (stripped with good drainage flow after), right drainst removed.\par \par 22- Patient notes sluggish drainage from left drains despite attempting to strip drains as instructed, with development of swelling. \par \par 5/10/22- left drain removed today \par \par 22- Seroma drained in office, I & D'ed for ~20cc. \par

## 2022-06-16 ENCOUNTER — RESULT REVIEW (OUTPATIENT)
Age: 62
End: 2022-06-16

## 2022-06-16 ENCOUNTER — APPOINTMENT (OUTPATIENT)
Dept: HEMATOLOGY ONCOLOGY | Facility: CLINIC | Age: 62
End: 2022-06-16
Payer: MEDICAID

## 2022-06-16 ENCOUNTER — APPOINTMENT (OUTPATIENT)
Dept: HEMATOLOGY ONCOLOGY | Facility: CLINIC | Age: 62
End: 2022-06-16

## 2022-06-16 ENCOUNTER — APPOINTMENT (OUTPATIENT)
Dept: INFUSION THERAPY | Facility: HOSPITAL | Age: 62
End: 2022-06-16

## 2022-06-16 LAB
ALBUMIN SERPL ELPH-MCNC: 4.2 G/DL — SIGNIFICANT CHANGE UP (ref 3.3–5)
ALP SERPL-CCNC: 109 U/L — SIGNIFICANT CHANGE UP (ref 40–120)
ALT FLD-CCNC: 19 U/L — SIGNIFICANT CHANGE UP (ref 10–45)
ANION GAP SERPL CALC-SCNC: 10 MMOL/L — SIGNIFICANT CHANGE UP (ref 5–17)
AST SERPL-CCNC: 16 U/L — SIGNIFICANT CHANGE UP (ref 10–40)
BASOPHILS # BLD AUTO: 0.04 K/UL — SIGNIFICANT CHANGE UP (ref 0–0.2)
BASOPHILS NFR BLD AUTO: 0.7 % — SIGNIFICANT CHANGE UP (ref 0–2)
BILIRUB SERPL-MCNC: <0.2 MG/DL — SIGNIFICANT CHANGE UP (ref 0.2–1.2)
BUN SERPL-MCNC: 8 MG/DL — SIGNIFICANT CHANGE UP (ref 7–23)
CALCIUM SERPL-MCNC: 8.7 MG/DL — SIGNIFICANT CHANGE UP (ref 8.4–10.5)
CHLORIDE SERPL-SCNC: 99 MMOL/L — SIGNIFICANT CHANGE UP (ref 96–108)
CO2 SERPL-SCNC: 24 MMOL/L — SIGNIFICANT CHANGE UP (ref 22–31)
CREAT SERPL-MCNC: 0.53 MG/DL — SIGNIFICANT CHANGE UP (ref 0.5–1.3)
EGFR: 104 ML/MIN/1.73M2 — SIGNIFICANT CHANGE UP
EOSINOPHIL # BLD AUTO: 0.23 K/UL — SIGNIFICANT CHANGE UP (ref 0–0.5)
EOSINOPHIL NFR BLD AUTO: 4 % — SIGNIFICANT CHANGE UP (ref 0–6)
GLUCOSE SERPL-MCNC: 123 MG/DL — HIGH (ref 70–99)
HCT VFR BLD CALC: 34.6 % — SIGNIFICANT CHANGE UP (ref 34.5–45)
HGB BLD-MCNC: 11.4 G/DL — LOW (ref 11.5–15.5)
IMM GRANULOCYTES NFR BLD AUTO: 0.7 % — SIGNIFICANT CHANGE UP (ref 0–1.5)
LYMPHOCYTES # BLD AUTO: 1.94 K/UL — SIGNIFICANT CHANGE UP (ref 1–3.3)
LYMPHOCYTES # BLD AUTO: 33.6 % — SIGNIFICANT CHANGE UP (ref 13–44)
MCHC RBC-ENTMCNC: 27.5 PG — SIGNIFICANT CHANGE UP (ref 27–34)
MCHC RBC-ENTMCNC: 32.9 G/DL — SIGNIFICANT CHANGE UP (ref 32–36)
MCV RBC AUTO: 83.4 FL — SIGNIFICANT CHANGE UP (ref 80–100)
MONOCYTES # BLD AUTO: 0.36 K/UL — SIGNIFICANT CHANGE UP (ref 0–0.9)
MONOCYTES NFR BLD AUTO: 6.2 % — SIGNIFICANT CHANGE UP (ref 2–14)
NEUTROPHILS # BLD AUTO: 3.17 K/UL — SIGNIFICANT CHANGE UP (ref 1.8–7.4)
NEUTROPHILS NFR BLD AUTO: 54.8 % — SIGNIFICANT CHANGE UP (ref 43–77)
NRBC # BLD: 0 /100 WBCS — SIGNIFICANT CHANGE UP (ref 0–0)
PLATELET # BLD AUTO: 274 K/UL — SIGNIFICANT CHANGE UP (ref 150–400)
POTASSIUM SERPL-MCNC: 4 MMOL/L — SIGNIFICANT CHANGE UP (ref 3.5–5.3)
POTASSIUM SERPL-SCNC: 4 MMOL/L — SIGNIFICANT CHANGE UP (ref 3.5–5.3)
PROT SERPL-MCNC: 6.5 G/DL — SIGNIFICANT CHANGE UP (ref 6–8.3)
RBC # BLD: 4.15 M/UL — SIGNIFICANT CHANGE UP (ref 3.8–5.2)
RBC # FLD: 13.5 % — SIGNIFICANT CHANGE UP (ref 10.3–14.5)
SODIUM SERPL-SCNC: 134 MMOL/L — LOW (ref 135–145)
WBC # BLD: 5.78 K/UL — SIGNIFICANT CHANGE UP (ref 3.8–10.5)
WBC # FLD AUTO: 5.78 K/UL — SIGNIFICANT CHANGE UP (ref 3.8–10.5)

## 2022-06-16 PROCEDURE — 99214 OFFICE O/P EST MOD 30 MIN: CPT

## 2022-06-17 NOTE — ASSESSMENT
[FreeTextEntry1] : ARI LOYA  is a 62 year old female with history of T2N0M0 (2.2cm), stage IIA RIGHT breast triple negative invasive ductal carcinoma, s/p adjuvant TC chemotherapy and RT.  She is here for newly diagnosed contralateral left invasive poorly differentiated ductal carcinoma, cT1cN0, ER negative <1%, [place] negative <1%, HER2 positive. \par \par -patient receiving second week of Taxol x12 + weekly Herceptin x 1 year and herceptin treatment.\par - Arm pain is not consistent with a hypersensitivity reaction as it resolves on its own and without any medication and does not recur after restarting infusion.   Improves with warm packs; no specific intervention warranted\par - consider MRI C-spine to further evaluate pain as it was present prior to chemotherpay \par - limited ROM s/p mastectomy - recommend PT\par -patient was given the time to ask questions which I answered to the best of my ability and to her apparent satisfaction. I encouraged her to call me with any additional questions. \par - f/up 1 weeks\par \par GERD\par - continue omeprazole am and pepcid q hs.

## 2022-06-17 NOTE — HISTORY OF PRESENT ILLNESS
[de-identified] : In 2011, pt was diagnosed with T2N0M0 (2.2cm), stage IIA RIGHT breast triple negative invasive ductal carcinoma with Taylor score 8 to 9.  She underwent lumpectomy Dr. Keyon Amezquita and then received adjuvant chemotherapy with taxotere and cytoxan for 4 cycles.  She then received IMRT with 5000cGy in 25 fractions followed by boost to the cavity of 1000cGy in 5 fractions 11/9/11-12/21/11 with Dr. Veena Walls. \par \par Pt had benign right breast stereotactic biopsy for calcifications in 7/26/2012 and s/p benign left breast US guided core biopsy 9/7/2018 (4-5:00).\par \par In 8/13/2020, screening mammogram/US showed calcifications in left mid breast as well as scattered and occasionally loosely grouped calcifications of central breast, 6mos follow up recommended. \par \par On 3/18/2021, follow up left breast mammogram showed probably benign calcifications in anterior left breast without significant change since 8/13/2020. FU in 8/2021 for stability.  \par \par In 12/28/21, bilateral mammo/US showed left breast 2-3:00 4cmfn 1.4cm irregular mass suspicious of malignancy, US core biopsy recommended; grouped heterogenous calcifications at right breast 10:00 anterior depth also suspicious for malignancy, bx recommended, BIRADS4. \par \par Pt underwent left breast 3:00 4cmfn US guided biopsy on 1/31/22 which showed invasive poorly differentiated ductal carcinoma, taylor score 8/9 (3+3+2), invasive tumor at least 0.8cm, microcalcifications present, no LVI.  ER negative <1%, [place] negative <1%, HER2 2+ IHC, CISH pending. \par \par MRI breast on 2/14/22 showed leftbreast 3:00 biopsy proven cancer associatedwith 1.4cmmass enhancement;adjacent continguous large area oflinerar nonmassenhancement seen extending from biopsysite into retroareolar/central/posterior left breast upto9.4cm,2 site MRI biopsy rec if alter surgical mgt;nosuspiciousenhancementin rightbrast,No ALD; [id number].\par \par MRI biopsyon 3/1/22 of both areas showedfibrocystic changes including sclerosing adenosis \par \par CT C/A/P 2/15/22 showedmultiple mildly enlargedmediastinal andhilarLNs,increasedn size compaerdto7/10/2020. \par Same day bone scan negativefor osseous metastasis. \par \par PET scan 3/2/22 showed knownleftbreast cancer,adjacent 4.4cm hematoma, indeterminate FDG avid leftsupraclavicular, mediastinal and hilar LAD new and/or enhlargedsince7/10/2020, mediastinal and hilar LNs symmetric distrubution suggests abenign etiology;leftsupraclavicularLNaccess toUS guidedbiopsy; subcm minimallyFDGavidleftaxillary LN nonspecific,,maybebiopsies. \par \par Leftsupraclavicular LN US guided FNA was negatibve formalignantcells. on 3/15/22 showed\par \par Saw Dr. Anmol Sanford pulmonary on4/11/22. Followupafter surgery to assessas the mediastinal LNsunlikelyelatedtobreastcancer. \par \par 4/25/2022 - Dr. coreas performed lumpectomy: final surgical pathology as follows:\par 1.8 cm poorly differentiated IDC with two negative SN; ER 0%; [place] 0%; HER 2 + by FISH\par \par Sister leukemia dx'd age 40. Mother had multiple myeloma.  Brother stomach cancer age 68.  Daughter with CLL age 30.  Paternal 2nd cousin breast cancer age 49. Paternal 2nd cousin liver cancer age 40 had hepatitis. Family history is negative for cancer of the ovary, endometrium, prostate, pancreatic and melanoma. \par The patient is not of Ashkenazi ethnic background. Never had genetic testing.  \par \par HEALTH MAINTENANCE \par PCP Dr. Maty Nunez\par GI Dr. Guo, colonoscopy 5 years, no polyps, due now\par GYN no pap smears, s/p MARK, no vaginal bleeding or spotting\par DERM Dr. Richards and Dr. Ramsey at [place]; no hx of skin cancer\par MSK h/o arthritis on duloxetine. \par s/p COVID vaccine Pfizer [de-identified] : 6/16/2022\par Patient presents today for herceptin + weekly taxol #2\par Called to see patient at 12:40 pm approximately 10 minutes after taxol started; patient complain of left arm worsening numbness (has baseline numbness/pain in left arm).  no dyspnea, no chest pain.  patient had similar reaction last week with first infusion; resolved after temporarily stopping taxol.  no hypersensitivity meds were given and treatment was restarted without incident.   \par Patient states pain is similar to last week.  \par \par Pt underwent bilateral mastectomies on 4/25/22 with Dr. Leon.\par s/p EBUS w/ Dr. Sanford - biopsy negative for malignancy; + granulomatous inflammation\par Patient denies any SOB, CP, abdominal pain, bone pain, headache, or unexplained weight loss\par c/o left lower arm numbness/tingling since surgery; no associated weakness. \par + GERD on omeprazole am and pepcid qhs \par Port placed \par Echo 5/2022 EF 63%

## 2022-06-17 NOTE — REASON FOR VISIT
[Family Member] : family member [Ad Hoc ] : provided by an ad hoc  [Urgent Visit] : an urgent  [FreeTextEntry2] : breast cancer [FreeTextEntry3] : family member

## 2022-06-21 ENCOUNTER — APPOINTMENT (OUTPATIENT)
Dept: PULMONOLOGY | Facility: CLINIC | Age: 62
End: 2022-06-21

## 2022-06-22 ENCOUNTER — NON-APPOINTMENT (OUTPATIENT)
Age: 62
End: 2022-06-22

## 2022-06-23 ENCOUNTER — APPOINTMENT (OUTPATIENT)
Dept: INFUSION THERAPY | Facility: HOSPITAL | Age: 62
End: 2022-06-23

## 2022-06-23 ENCOUNTER — RESULT REVIEW (OUTPATIENT)
Age: 62
End: 2022-06-23

## 2022-06-23 ENCOUNTER — APPOINTMENT (OUTPATIENT)
Dept: HEMATOLOGY ONCOLOGY | Facility: CLINIC | Age: 62
End: 2022-06-23

## 2022-06-23 VITALS
OXYGEN SATURATION: 99 % | SYSTOLIC BLOOD PRESSURE: 147 MMHG | WEIGHT: 148.68 LBS | TEMPERATURE: 97.2 F | BODY MASS INDEX: 27.36 KG/M2 | HEIGHT: 62 IN | HEART RATE: 60 BPM | RESPIRATION RATE: 16 BRPM | DIASTOLIC BLOOD PRESSURE: 84 MMHG

## 2022-06-23 DIAGNOSIS — M79.601 PAIN IN RIGHT ARM: ICD-10-CM

## 2022-06-23 DIAGNOSIS — G62.9 POLYNEUROPATHY, UNSPECIFIED: ICD-10-CM

## 2022-06-23 LAB
ALBUMIN SERPL ELPH-MCNC: 4.4 G/DL — SIGNIFICANT CHANGE UP (ref 3.3–5)
ALP SERPL-CCNC: 102 U/L — SIGNIFICANT CHANGE UP (ref 40–120)
ALT FLD-CCNC: 24 U/L — SIGNIFICANT CHANGE UP (ref 10–45)
ANION GAP SERPL CALC-SCNC: 9 MMOL/L — SIGNIFICANT CHANGE UP (ref 5–17)
AST SERPL-CCNC: 19 U/L — SIGNIFICANT CHANGE UP (ref 10–40)
BASOPHILS # BLD AUTO: 0.04 K/UL — SIGNIFICANT CHANGE UP (ref 0–0.2)
BASOPHILS NFR BLD AUTO: 0.9 % — SIGNIFICANT CHANGE UP (ref 0–2)
BILIRUB SERPL-MCNC: <0.2 MG/DL — SIGNIFICANT CHANGE UP (ref 0.2–1.2)
BUN SERPL-MCNC: 8 MG/DL — SIGNIFICANT CHANGE UP (ref 7–23)
CALCIUM SERPL-MCNC: 9.1 MG/DL — SIGNIFICANT CHANGE UP (ref 8.4–10.5)
CHLORIDE SERPL-SCNC: 100 MMOL/L — SIGNIFICANT CHANGE UP (ref 96–108)
CO2 SERPL-SCNC: 24 MMOL/L — SIGNIFICANT CHANGE UP (ref 22–31)
CREAT SERPL-MCNC: 0.58 MG/DL — SIGNIFICANT CHANGE UP (ref 0.5–1.3)
EGFR: 102 ML/MIN/1.73M2 — SIGNIFICANT CHANGE UP
EOSINOPHIL # BLD AUTO: 0.16 K/UL — SIGNIFICANT CHANGE UP (ref 0–0.5)
EOSINOPHIL NFR BLD AUTO: 3.7 % — SIGNIFICANT CHANGE UP (ref 0–6)
GLUCOSE SERPL-MCNC: 138 MG/DL — HIGH (ref 70–99)
HCT VFR BLD CALC: 35.4 % — SIGNIFICANT CHANGE UP (ref 34.5–45)
HGB BLD-MCNC: 11.5 G/DL — SIGNIFICANT CHANGE UP (ref 11.5–15.5)
IMM GRANULOCYTES NFR BLD AUTO: 0.9 % — SIGNIFICANT CHANGE UP (ref 0–1.5)
LYMPHOCYTES # BLD AUTO: 1.88 K/UL — SIGNIFICANT CHANGE UP (ref 1–3.3)
LYMPHOCYTES # BLD AUTO: 42.9 % — SIGNIFICANT CHANGE UP (ref 13–44)
MCHC RBC-ENTMCNC: 27.6 PG — SIGNIFICANT CHANGE UP (ref 27–34)
MCHC RBC-ENTMCNC: 32.5 G/DL — SIGNIFICANT CHANGE UP (ref 32–36)
MCV RBC AUTO: 85.1 FL — SIGNIFICANT CHANGE UP (ref 80–100)
MONOCYTES # BLD AUTO: 0.34 K/UL — SIGNIFICANT CHANGE UP (ref 0–0.9)
MONOCYTES NFR BLD AUTO: 7.8 % — SIGNIFICANT CHANGE UP (ref 2–14)
NEUTROPHILS # BLD AUTO: 1.92 K/UL — SIGNIFICANT CHANGE UP (ref 1.8–7.4)
NEUTROPHILS NFR BLD AUTO: 43.8 % — SIGNIFICANT CHANGE UP (ref 43–77)
NRBC # BLD: 0 /100 WBCS — SIGNIFICANT CHANGE UP (ref 0–0)
PLATELET # BLD AUTO: 269 K/UL — SIGNIFICANT CHANGE UP (ref 150–400)
POTASSIUM SERPL-MCNC: 3.7 MMOL/L — SIGNIFICANT CHANGE UP (ref 3.5–5.3)
POTASSIUM SERPL-SCNC: 3.7 MMOL/L — SIGNIFICANT CHANGE UP (ref 3.5–5.3)
PROT SERPL-MCNC: 6.8 G/DL — SIGNIFICANT CHANGE UP (ref 6–8.3)
RBC # BLD: 4.16 M/UL — SIGNIFICANT CHANGE UP (ref 3.8–5.2)
RBC # FLD: 14.1 % — SIGNIFICANT CHANGE UP (ref 10.3–14.5)
SODIUM SERPL-SCNC: 133 MMOL/L — LOW (ref 135–145)
WBC # BLD: 4.38 K/UL — SIGNIFICANT CHANGE UP (ref 3.8–10.5)
WBC # FLD AUTO: 4.38 K/UL — SIGNIFICANT CHANGE UP (ref 3.8–10.5)

## 2022-06-23 PROCEDURE — 99214 OFFICE O/P EST MOD 30 MIN: CPT

## 2022-06-24 ENCOUNTER — NON-APPOINTMENT (OUTPATIENT)
Age: 62
End: 2022-06-24

## 2022-06-26 PROBLEM — M79.2 RADICULAR PAIN IN LEFT ARM: Status: ACTIVE | Noted: 2022-06-23

## 2022-06-28 ENCOUNTER — APPOINTMENT (OUTPATIENT)
Dept: SURGICAL ONCOLOGY | Facility: CLINIC | Age: 62
End: 2022-06-28

## 2022-06-28 ENCOUNTER — APPOINTMENT (OUTPATIENT)
Dept: MRI IMAGING | Facility: IMAGING CENTER | Age: 62
End: 2022-06-28

## 2022-06-28 VITALS
HEIGHT: 62 IN | SYSTOLIC BLOOD PRESSURE: 148 MMHG | HEART RATE: 85 BPM | WEIGHT: 147 LBS | BODY MASS INDEX: 27.05 KG/M2 | DIASTOLIC BLOOD PRESSURE: 80 MMHG

## 2022-06-28 VITALS — TEMPERATURE: 97.7 F

## 2022-06-28 PROCEDURE — 10021 FNA BX W/O IMG GDN 1ST LES: CPT | Mod: 58

## 2022-06-28 NOTE — PROCEDURE
[Fine Needle Aspiration] : fine needle aspiration ~T ~C was performed [Area of Mass: ______] : mass identified in the [unfilled] [Patient] : the patient [Risks] : risks [Supine] : the patient was placed in the supine position with the neck extended as tolerated [Betadine] : with betadine solution [20 gauge 1 inch] : A 20 gauge 1 inch needle was used [1 Pass] : 1 pass was made through the mass [Ultrasonic Guidance] : ultrasound guidance was not employed [Tolerated Well] : the patient tolerated the procedure well [No Complications] : there were no complications [de-identified] : 10 cc hematoma I and d'ed

## 2022-06-30 ENCOUNTER — RESULT REVIEW (OUTPATIENT)
Age: 62
End: 2022-06-30

## 2022-06-30 ENCOUNTER — APPOINTMENT (OUTPATIENT)
Dept: INFUSION THERAPY | Facility: HOSPITAL | Age: 62
End: 2022-06-30

## 2022-06-30 ENCOUNTER — APPOINTMENT (OUTPATIENT)
Dept: HEMATOLOGY ONCOLOGY | Facility: CLINIC | Age: 62
End: 2022-06-30

## 2022-06-30 LAB
ALBUMIN SERPL ELPH-MCNC: 4.2 G/DL — SIGNIFICANT CHANGE UP (ref 3.3–5)
ALP SERPL-CCNC: 98 U/L — SIGNIFICANT CHANGE UP (ref 40–120)
ALT FLD-CCNC: 19 U/L — SIGNIFICANT CHANGE UP (ref 10–45)
ANION GAP SERPL CALC-SCNC: 13 MMOL/L — SIGNIFICANT CHANGE UP (ref 5–17)
AST SERPL-CCNC: 17 U/L — SIGNIFICANT CHANGE UP (ref 10–40)
BASOPHILS # BLD AUTO: 0.03 K/UL — SIGNIFICANT CHANGE UP (ref 0–0.2)
BASOPHILS NFR BLD AUTO: 0.7 % — SIGNIFICANT CHANGE UP (ref 0–2)
BILIRUB SERPL-MCNC: <0.2 MG/DL — SIGNIFICANT CHANGE UP (ref 0.2–1.2)
BUN SERPL-MCNC: 10 MG/DL — SIGNIFICANT CHANGE UP (ref 7–23)
CALCIUM SERPL-MCNC: 9 MG/DL — SIGNIFICANT CHANGE UP (ref 8.4–10.5)
CHLORIDE SERPL-SCNC: 102 MMOL/L — SIGNIFICANT CHANGE UP (ref 96–108)
CO2 SERPL-SCNC: 22 MMOL/L — SIGNIFICANT CHANGE UP (ref 22–31)
CREAT SERPL-MCNC: 0.47 MG/DL — LOW (ref 0.5–1.3)
EGFR: 108 ML/MIN/1.73M2 — SIGNIFICANT CHANGE UP
EOSINOPHIL # BLD AUTO: 0.11 K/UL — SIGNIFICANT CHANGE UP (ref 0–0.5)
EOSINOPHIL NFR BLD AUTO: 2.4 % — SIGNIFICANT CHANGE UP (ref 0–6)
GLUCOSE SERPL-MCNC: 149 MG/DL — HIGH (ref 70–99)
HCT VFR BLD CALC: 33.4 % — LOW (ref 34.5–45)
HGB BLD-MCNC: 11.2 G/DL — LOW (ref 11.5–15.5)
IMM GRANULOCYTES NFR BLD AUTO: 1.1 % — SIGNIFICANT CHANGE UP (ref 0–1.5)
LYMPHOCYTES # BLD AUTO: 1.64 K/UL — SIGNIFICANT CHANGE UP (ref 1–3.3)
LYMPHOCYTES # BLD AUTO: 36.2 % — SIGNIFICANT CHANGE UP (ref 13–44)
MCHC RBC-ENTMCNC: 27.5 PG — SIGNIFICANT CHANGE UP (ref 27–34)
MCHC RBC-ENTMCNC: 33.5 G/DL — SIGNIFICANT CHANGE UP (ref 32–36)
MCV RBC AUTO: 81.9 FL — SIGNIFICANT CHANGE UP (ref 80–100)
MONOCYTES # BLD AUTO: 0.35 K/UL — SIGNIFICANT CHANGE UP (ref 0–0.9)
MONOCYTES NFR BLD AUTO: 7.7 % — SIGNIFICANT CHANGE UP (ref 2–14)
NEUTROPHILS # BLD AUTO: 2.35 K/UL — SIGNIFICANT CHANGE UP (ref 1.8–7.4)
NEUTROPHILS NFR BLD AUTO: 51.9 % — SIGNIFICANT CHANGE UP (ref 43–77)
NRBC # BLD: 0 /100 WBCS — SIGNIFICANT CHANGE UP (ref 0–0)
PLATELET # BLD AUTO: 297 K/UL — SIGNIFICANT CHANGE UP (ref 150–400)
POTASSIUM SERPL-MCNC: 4.2 MMOL/L — SIGNIFICANT CHANGE UP (ref 3.5–5.3)
POTASSIUM SERPL-SCNC: 4.2 MMOL/L — SIGNIFICANT CHANGE UP (ref 3.5–5.3)
PROT SERPL-MCNC: 6.8 G/DL — SIGNIFICANT CHANGE UP (ref 6–8.3)
RBC # BLD: 4.08 M/UL — SIGNIFICANT CHANGE UP (ref 3.8–5.2)
RBC # FLD: 14.8 % — HIGH (ref 10.3–14.5)
SODIUM SERPL-SCNC: 136 MMOL/L — SIGNIFICANT CHANGE UP (ref 135–145)
WBC # BLD: 4.53 K/UL — SIGNIFICANT CHANGE UP (ref 3.8–10.5)
WBC # FLD AUTO: 4.53 K/UL — SIGNIFICANT CHANGE UP (ref 3.8–10.5)

## 2022-07-02 LAB
CULTURE RESULTS: SIGNIFICANT CHANGE UP
CULTURE RESULTS: SIGNIFICANT CHANGE UP
SPECIMEN SOURCE: SIGNIFICANT CHANGE UP
SPECIMEN SOURCE: SIGNIFICANT CHANGE UP

## 2022-07-06 ENCOUNTER — APPOINTMENT (OUTPATIENT)
Dept: SURGICAL ONCOLOGY | Facility: CLINIC | Age: 62
End: 2022-07-06

## 2022-07-06 PROBLEM — G62.9 PERIPHERAL NEUROPATHY: Status: ACTIVE | Noted: 2022-06-09

## 2022-07-06 PROBLEM — M79.601 PAIN WITH RAISING OF RIGHT UPPER EXTREMITY: Status: ACTIVE | Noted: 2022-06-17

## 2022-07-06 NOTE — REVIEW OF SYSTEMS
[Negative] : Allergic/Immunologic [FreeTextEntry7] : GERD [de-identified] : skin breast changes 68 [de-identified] : see HPI

## 2022-07-06 NOTE — ASSESSMENT
[FreeTextEntry1] : ARI LOYA  is a 62 year old female with history of T2N0M0 (2.2cm), stage IIA RIGHT breast triple negative invasive ductal carcinoma, s/p adjuvant TC chemotherapy and RT.  She is here for newly diagnosed contralateral left invasive poorly differentiated ductal carcinoma, cT1cN0, ER negative <1%, MA negative <1%, HER2 positive. \par \par -patient receiving Week #3 of Taxol x12 + weekly Herceptin x 1 year and herceptin treatment.\par - Resolving hematoma per Dr. Coreas's note and patient's daughter who agrees that area is improving with decreased swelling and redness;  Picture taken for documentation and uploaded to [employer].\par - continue f/up with Dr. coreas to ensure ongoing resolution \par -patient was given the time to ask questions which I answered to the best of my ability and to her apparent satisfaction. I encouraged her to call me with any additional questions. \par - f/up 1 weeks\par \par GERD\par - continue omeprazole am and pepcid q hs. \par \par Left sided radicular pain: R/O disk herniation of cervical spine\par - gabapentin 300 mg q hs\par - ibuprofen 400 mg every 4 hours as needed\par - limit use of paracetamol to 1 g daily\par - MRI Cspine

## 2022-07-06 NOTE — PHYSICAL EXAM
[Restricted in physically strenuous activity but ambulatory and able to carry out work of a light or sedentary nature] : Status 1- Restricted in physically strenuous activity but ambulatory and able to carry out work of a light or sedentary nature, e.g., light house work, office work [Normal] : affect appropriate [de-identified] : Left upper extremity - pain with extension noted and numbness tingling in left hand with extension; no weakness noted on exam in upper extremities [de-identified] : s/p b/l mastectomy without reconstruction; right chest wall no erythema or swelling; Left surgical incision with erythema and fluctuant area of swelling over incision (patient' daughter states this area is improved over last few weeks);  [de-identified] : see above

## 2022-07-06 NOTE — HISTORY OF PRESENT ILLNESS
[de-identified] : In 2011, pt was diagnosed with T2N0M0 (2.2cm), stage IIA RIGHT breast triple negative invasive ductal carcinoma with Taylor score 8 to 9.  She underwent lumpectomy Dr. Keyon Amezquita and then received adjuvant chemotherapy with taxotere and cytoxan for 4 cycles.  She then received IMRT with 5000cGy in 25 fractions followed by boost to the cavity of 1000cGy in 5 fractions 11/9/11-12/21/11 with Dr. Veena Walls. \par \par Pt had benign right breast stereotactic biopsy for calcifications in 7/26/2012 and s/p benign left breast US guided core biopsy 9/7/2018 (4-5:00).\par \par In 8/13/2020, screening mammogram/US showed calcifications in left mid breast as well as scattered and occasionally loosely grouped calcifications of central breast, 6mos follow up recommended. \par \par On 3/18/2021, follow up left breast mammogram showed probably benign calcifications in anterior left breast without significant change since 8/13/2020. FU in 8/2021 for stability.  \par \par In 12/28/21, bilateral mammo/US showed left breast 2-3:00 4cmfn 1.4cm irregular mass suspicious of malignancy, US core biopsy recommended; grouped heterogenous calcifications at right breast 10:00 anterior depth also suspicious for malignancy, bx recommended, BIRADS4. \par \par Pt underwent left breast 3:00 4cmfn US guided biopsy on 1/31/22 which showed invasive poorly differentiated ductal carcinoma, taylor score 8/9 (3+3+2), invasive tumor at least 0.8cm, microcalcifications present, no LVI.  ER negative <1%, DC negative <1%, HER2 2+ IHC, CISH pending. \par \par MRI breast on 2/14/22 showed leftbreast 3:00 biopsy proven cancer associatedwith 1.4cmmass enhancement;adjacent continguous large area oflinerar nonmassenhancement seen extending from biopsysite into retroareolar/central/posterior left breast upto9.4cm,2 site MRI biopsy rec if alter surgical mgt;nosuspiciousenhancementin rightbrast,No ALD; [id number].\par \par MRI biopsyon 3/1/22 of both areas showedfibrocystic changes including sclerosing adenosis \par \par CT C/A/P 2/15/22 showedmultiple mildly enlargedmediastinal andhilarLNs,increasedn size compaerdto7/10/2020. \par Same day bone scan negativefor osseous metastasis. \par \par PET scan 3/2/22 showed knownleftbreast cancer,adjacent 4.4cm hematoma, indeterminate FDG avid leftsupraclavicular, mediastinal and hilar LAD new and/or enhlargedsince7/10/2020, mediastinal and hilar LNs symmetric distrubution suggests abenign etiology;leftsupraclavicularLNaccess toUS guidedbiopsy; subcm minimallyFDGavidleftaxillary LN nonspecific,,maybebiopsies. \par \par Leftsupraclavicular LN US guided FNA was negatibve formalignantcells. on 3/15/22 showed\par \par Saw Dr. Anmol Sanford pulmonary on4/11/22. Followupafter surgery to assessas the mediastinal LNsunlikelyelatedtobreastcancer. \par \par 4/25/2022 - Dr. coreas performed lumpectomy: final surgical pathology as follows:\par 1.8 cm poorly differentiated IDC with two negative SN; ER 0%; DC 0%; HER 2 + by FISH\par \par Sister leukemia dx'd age 40. Mother had multiple myeloma.  Brother stomach cancer age 68.  Daughter with CLL age 30.  Paternal 2nd cousin breast cancer age 49. Paternal 2nd cousin liver cancer age 40 had hepatitis. Family history is negative for cancer of the ovary, endometrium, prostate, pancreatic and melanoma. \par The patient is not of Ashkenazi ethnic background. Never had genetic testing.  \par \par HEALTH MAINTENANCE \par PCP Dr. Maty Nunez\par GI Dr. Guo, colonoscopy 5 years, no polyps, due now\par GYN no pap smears, s/p MARK, no vaginal bleeding or spotting\par DERM Dr. Richards and Dr. Ramsey at [place]; no hx of skin cancer\par MSK h/o arthritis on duloxetine. \par s/p COVID vaccine Pfizer [de-identified] : 6/23/2022\par Patient presents today for herceptin + weekly taxol #3\par c/o significant body pains after taxol starting night of treatment (6/10 pain); worse on the left side in axilla, arm and left hip; Using \par taking 2g of paracetamol + codeine 60 mg + caffeine (Roxicet Plus 4 tablets at once) - minimally helps the pain. \par left sided arm pain w/ numbness (started even before chemo) but now worsened\par Patient also notes pain along left chest wall incision - seen by Dr. leon on 6/14/2022 and advised of hematoma which he felt was improving and reabsorbing.  Patient's daughter agrees that the area is improved in redness and swelling, but that pain is still significant. \par no fevers, no neuropathy of toes, no diarrhea, no constipation\par \par \par \par Pt underwent bilateral mastectomies on 4/25/22 with Dr. Leon.\par s/p EBUS w/ Dr. Sanford - biopsy negative for malignancy; + granulomatous inflammation\par Patient denies any SOB, CP, abdominal pain, bone pain, headache, or unexplained weight loss\par c/o left lower arm numbness/tingling since surgery; no associated weakness. \par + GERD on omeprazole am and pepcid qhs \par Port placed \par Echo 5/2022 EF 63%

## 2022-07-06 NOTE — REASON FOR VISIT
[Family Member] : family member [Ad Hoc ] : provided by an ad hoc  [Pre-Treatment Visit] : a pre-treatment [FreeTextEntry2] : breast cancer [FreeTextEntry3] : family member

## 2022-07-07 ENCOUNTER — APPOINTMENT (OUTPATIENT)
Dept: HEMATOLOGY ONCOLOGY | Facility: CLINIC | Age: 62
End: 2022-07-07

## 2022-07-07 ENCOUNTER — RESULT REVIEW (OUTPATIENT)
Age: 62
End: 2022-07-07

## 2022-07-07 ENCOUNTER — APPOINTMENT (OUTPATIENT)
Dept: INFUSION THERAPY | Facility: HOSPITAL | Age: 62
End: 2022-07-07

## 2022-07-07 LAB
ALBUMIN SERPL ELPH-MCNC: 4.2 G/DL — SIGNIFICANT CHANGE UP (ref 3.3–5)
ALP SERPL-CCNC: 96 U/L — SIGNIFICANT CHANGE UP (ref 40–120)
ALT FLD-CCNC: 15 U/L — SIGNIFICANT CHANGE UP (ref 10–45)
ANION GAP SERPL CALC-SCNC: 12 MMOL/L — SIGNIFICANT CHANGE UP (ref 5–17)
AST SERPL-CCNC: 17 U/L — SIGNIFICANT CHANGE UP (ref 10–40)
BASOPHILS # BLD AUTO: 0.03 K/UL — SIGNIFICANT CHANGE UP (ref 0–0.2)
BASOPHILS NFR BLD AUTO: 0.7 % — SIGNIFICANT CHANGE UP (ref 0–2)
BILIRUB SERPL-MCNC: 0.2 MG/DL — SIGNIFICANT CHANGE UP (ref 0.2–1.2)
BUN SERPL-MCNC: 13 MG/DL — SIGNIFICANT CHANGE UP (ref 7–23)
CALCIUM SERPL-MCNC: 8.3 MG/DL — LOW (ref 8.4–10.5)
CHLORIDE SERPL-SCNC: 100 MMOL/L — SIGNIFICANT CHANGE UP (ref 96–108)
CO2 SERPL-SCNC: 22 MMOL/L — SIGNIFICANT CHANGE UP (ref 22–31)
CREAT SERPL-MCNC: 0.48 MG/DL — LOW (ref 0.5–1.3)
EGFR: 107 ML/MIN/1.73M2 — SIGNIFICANT CHANGE UP
EOSINOPHIL # BLD AUTO: 0.09 K/UL — SIGNIFICANT CHANGE UP (ref 0–0.5)
EOSINOPHIL NFR BLD AUTO: 2.1 % — SIGNIFICANT CHANGE UP (ref 0–6)
GLUCOSE SERPL-MCNC: 141 MG/DL — HIGH (ref 70–99)
HCT VFR BLD CALC: 33 % — LOW (ref 34.5–45)
HGB BLD-MCNC: 10.9 G/DL — LOW (ref 11.5–15.5)
IMM GRANULOCYTES NFR BLD AUTO: 0.9 % — SIGNIFICANT CHANGE UP (ref 0–1.5)
LYMPHOCYTES # BLD AUTO: 1.63 K/UL — SIGNIFICANT CHANGE UP (ref 1–3.3)
LYMPHOCYTES # BLD AUTO: 38.4 % — SIGNIFICANT CHANGE UP (ref 13–44)
MCHC RBC-ENTMCNC: 27.5 PG — SIGNIFICANT CHANGE UP (ref 27–34)
MCHC RBC-ENTMCNC: 33 G/DL — SIGNIFICANT CHANGE UP (ref 32–36)
MCV RBC AUTO: 83.1 FL — SIGNIFICANT CHANGE UP (ref 80–100)
MONOCYTES # BLD AUTO: 0.34 K/UL — SIGNIFICANT CHANGE UP (ref 0–0.9)
MONOCYTES NFR BLD AUTO: 8 % — SIGNIFICANT CHANGE UP (ref 2–14)
NEUTROPHILS # BLD AUTO: 2.12 K/UL — SIGNIFICANT CHANGE UP (ref 1.8–7.4)
NEUTROPHILS NFR BLD AUTO: 49.9 % — SIGNIFICANT CHANGE UP (ref 43–77)
NRBC # BLD: 0 /100 WBCS — SIGNIFICANT CHANGE UP (ref 0–0)
PLATELET # BLD AUTO: 290 K/UL — SIGNIFICANT CHANGE UP (ref 150–400)
POTASSIUM SERPL-MCNC: 4 MMOL/L — SIGNIFICANT CHANGE UP (ref 3.5–5.3)
POTASSIUM SERPL-SCNC: 4 MMOL/L — SIGNIFICANT CHANGE UP (ref 3.5–5.3)
PROT SERPL-MCNC: 6.5 G/DL — SIGNIFICANT CHANGE UP (ref 6–8.3)
RBC # BLD: 3.97 M/UL — SIGNIFICANT CHANGE UP (ref 3.8–5.2)
RBC # FLD: 14.9 % — HIGH (ref 10.3–14.5)
SODIUM SERPL-SCNC: 135 MMOL/L — SIGNIFICANT CHANGE UP (ref 135–145)
WBC # BLD: 4.25 K/UL — SIGNIFICANT CHANGE UP (ref 3.8–10.5)
WBC # FLD AUTO: 4.25 K/UL — SIGNIFICANT CHANGE UP (ref 3.8–10.5)

## 2022-07-07 PROCEDURE — 99215 OFFICE O/P EST HI 40 MIN: CPT

## 2022-07-07 NOTE — HISTORY OF PRESENT ILLNESS
[de-identified] : In 2011, pt was diagnosed with T2N0M0 (2.2cm), stage IIA RIGHT breast triple negative invasive ductal carcinoma with Taylor score 8 to 9.  She underwent lumpectomy Dr. Keyon Amezquita and then received adjuvant chemotherapy with taxotere and cytoxan for 4 cycles.  She then received IMRT with 5000cGy in 25 fractions followed by boost to the cavity of 1000cGy in 5 fractions 11/9/11-12/21/11 with Dr. Veena Walls. \par \par Pt had benign right breast stereotactic biopsy for calcifications in 7/26/2012 and s/p benign left breast US guided core biopsy 9/7/2018 (4-5:00).\par \par In 8/13/2020, screening mammogram/US showed calcifications in left mid breast as well as scattered and occasionally loosely grouped calcifications of central breast, 6mos follow up recommended. \par \par On 3/18/2021, follow up left breast mammogram showed probably benign calcifications in anterior left breast without significant change since 8/13/2020. FU in 8/2021 for stability.  \par \par In 12/28/21, bilateral mammo/US showed left breast 2-3:00 4cmfn 1.4cm irregular mass suspicious of malignancy, US core biopsy recommended; grouped heterogenous calcifications at right breast 10:00 anterior depth also suspicious for malignancy, bx recommended, BIRADS4. \par \par Pt underwent left breast 3:00 4cmfn US guided biopsy on 1/31/22 which showed invasive poorly differentiated ductal carcinoma, taylor score 8/9 (3+3+2), invasive tumor at least 0.8cm, microcalcifications present, no LVI.  ER negative <1%, MA negative <1%, HER2 2+ IHC, CISH pending. \par \par MRI breast on 2/14/22 showed leftbreast 3:00 biopsy proven cancer associatedwith 1.4cmmass enhancement;adjacent continguous large area oflinerar nonmassenhancement seen extending from biopsysite into retroareolar/central/posterior left breast upto9.4cm,2 site MRI biopsy rec if alter surgical mgt;nosuspiciousenhancementin rightbrast,No ALD; [id number].\par \par MRI biopsyon 3/1/22 of both areas showedfibrocystic changes including sclerosing adenosis \par \par CT C/A/P 2/15/22 showedmultiple mildly enlargedmediastinal andhilarLNs,increasedn size compaerdto7/10/2020. \par Same day bone scan negativefor osseous metastasis. \par \par PET scan 3/2/22 showed knownleftbreast cancer,adjacent 4.4cm hematoma, indeterminate FDG avid leftsupraclavicular, mediastinal and hilar LAD new and/or enhlargedsince7/10/2020, mediastinal and hilar LNs symmetric distrubution suggests abenign etiology;leftsupraclavicularLNaccess toUS guidedbiopsy; subcm minimallyFDGavidleftaxillary LN nonspecific,,maybebiopsies. \par \par Leftsupraclavicular LN US guided FNA was negatibve formalignantcells. on 3/15/22 showed\par \par Saw Dr. Anmol Sanford pulmonary on4/11/22. Followupafter surgery to assessas the mediastinal LNsunlikelyelatedtobreastcancer. \par \par 4/25/2022 - Dr. coreas performed lumpectomy: final surgical pathology as follows:\par 1.8 cm poorly differentiated IDC with two negative SN; ER 0%; MA 0%; HER 2 + by FISH\par \par Sister leukemia dx'd age 40. Mother had multiple myeloma.  Brother stomach cancer age 68.  Daughter with CLL age 30.  Paternal 2nd cousin breast cancer age 49. Paternal 2nd cousin liver cancer age 40 had hepatitis. Family history is negative for cancer of the ovary, endometrium, prostate, pancreatic and melanoma. \par The patient is not of Ashkenazi ethnic background. Never had genetic testing.  \par \par HEALTH MAINTENANCE \par PCP Dr. Maty uNnez\par GI Dr. Guo, colonoscopy 5 years, no polyps, due now\par GYN no pap smears, s/p MARK, no vaginal bleeding or spotting\par DERM Dr. Richards and Dr. Ramsey at [place]; no hx of skin cancer\par MSK h/o arthritis on duloxetine. \par s/p COVID vaccine Pfizer [de-identified] : 7/7/22 \par Patient presents today for herceptin + weekly taxol #5\par denies joint pains, chest pain, SOB, cough. \par c/o intermittent headaches but not worsening. \par left chest wall incision now draining serous fluid, erythema improved.  Seen by Dr. leon on 6/14/2022 and advised of hematoma which he felt was improving and reabsorbing. \par no fevers, no neuropathy of toes, no diarrhea, no constipation\par \par \par \par Pt underwent bilateral mastectomies on 4/25/22 with Dr. Leon.\par s/p EBUS w/ Dr. Sanford - biopsy negative for malignancy; + granulomatous inflammation\par Patient denies any SOB, CP, abdominal pain, bone pain, headache, or unexplained weight loss\par c/o left lower arm numbness/tingling since surgery; no associated weakness. \par + GERD on omeprazole am and pepcid qhs \par Port placed \par Echo 5/2022 EF 63%

## 2022-07-07 NOTE — REVIEW OF SYSTEMS
[Negative] : Allergic/Immunologic [FreeTextEntry7] : GERD [de-identified] : skin breast changes [de-identified] : see HPI

## 2022-07-07 NOTE — ASSESSMENT
[FreeTextEntry1] : ARI LOYA  is a 62 year old female with history of T2N0M0 (2.2cm), stage IIA RIGHT breast triple negative invasive ductal carcinoma, s/p adjuvant TC chemotherapy and RT.  She is here for newly diagnosed contralateral left invasive poorly differentiated ductal carcinoma, cT1cN0, ER negative <1%, OH negative <1%, HER2 positive. \par \par -patient receiving Week #5 of Taxol + weekly Herceptin\par -tolerating well, cbc reviewed ok to proceed with treatment\par -resolving hematoma; continue f/up with Dr. Leon to ensure ongoing resolution \par -continue with supportive medications\par -I encouraged her to call me with any additional questions. \par - f/up 1 weeks\par

## 2022-07-07 NOTE — REASON FOR VISIT
[Pre-Treatment Visit] : a pre-treatment [Family Member] : family member [Ad Hoc ] : provided by an ad hoc  [Follow-Up Visit] : a follow-up [FreeTextEntry2] : breast cancer [FreeTextEntry3] : family member

## 2022-07-07 NOTE — PHYSICAL EXAM
[Restricted in physically strenuous activity but ambulatory and able to carry out work of a light or sedentary nature] : Status 1- Restricted in physically strenuous activity but ambulatory and able to carry out work of a light or sedentary nature, e.g., light house work, office work [Normal] : affect appropriate [de-identified] : s/p b/l mastectomy without reconstruction; right chest wall no erythema or swelling; Left surgical incision draining serous fluid, erythema and swelling improved, no evidence of infection [de-identified] : Left upper extremity - pain with extension noted and numbness tingling in left hand with extension; no weakness noted on exam in upper extremities [de-identified] : see above

## 2022-07-12 ENCOUNTER — APPOINTMENT (OUTPATIENT)
Dept: SURGICAL ONCOLOGY | Facility: CLINIC | Age: 62
End: 2022-07-12

## 2022-07-12 VITALS
HEART RATE: 74 BPM | BODY MASS INDEX: 27.05 KG/M2 | SYSTOLIC BLOOD PRESSURE: 111 MMHG | WEIGHT: 147 LBS | OXYGEN SATURATION: 97 % | HEIGHT: 62 IN | DIASTOLIC BLOOD PRESSURE: 72 MMHG

## 2022-07-12 VITALS — TEMPERATURE: 97.3 F

## 2022-07-12 PROCEDURE — 99024 POSTOP FOLLOW-UP VISIT: CPT

## 2022-07-12 NOTE — ASSESSMENT
[FreeTextEntry1] : IMP: \par She is s/p Bilateral mastectomies with left axillary sentinel node biopsy on 4/25/22.  Pathology revealed:\par 1) RIGHT Breast: focal ADH\par 2) LEFT Breast: Invasive poorly differentiated ductal carcinoma (1.8 cm), DCIS, 2 benign left axillary lymph nodes (one of which was a sentinel node), negative margins.  T1cN0, ER-HI-HER2 equivocal (POSITIVE by in situ hybridization).\par \par Adjvant chemotherapy continues\par Hematoma re-absorbing\par \par \par PLAN:\par RTO 2 months to check hematoma and possibly revise scar after chemo is completed.\par

## 2022-07-12 NOTE — HISTORY OF PRESENT ILLNESS
[de-identified] : ARI LOYA  is a 62 year old female here for a postop visit.\par chemotherapy finishes in .\par \par In , pt was diagnosed with T2N0M0 (2.2cm), stage IIA RIGHT breast triple negative invasive ductal carcinoma with Macy score 8 to 9.  She underwent lumpectomy Dr. Keyon Amezquita and then received adjuvant chemotherapy with taxotere and cytoxan for 4 cycles.  She then received IMRT with 5000cGy in 25 fractions followed by boost to the cavity of 1000cGy in 5 fractions 11-11 with Dr. Veena Walls. \par \par Pt had benign right breast stereotactic biopsy for calcifications in 2012 and s/p benign left breast US guided core biopsy 2018 (4-5:00).\par \par In 2020, screening mammogram/US showed calcifications in left mid breast as well as scattered and occasionally loosely grouped calcifications of central breast, 6mos follow up recommended. \par \par On 3/18/2021, follow up left breast mammogram showed probably benign calcifications in anterior left breast without significant change since 2020. FU in 2021 for stability.  \par \par In 21, bilateral mammo/US showed left breast 2-3:00 4cmfn 1.4cm irregular mass suspicious of malignancy, US core biopsy recommended; grouped heterogenous calcifications at right breast 10:00 anterior depth also suspicious for malignancy, bx recommended, BIRADS4. \par \par Pt underwent left breast 3:00 4cmfn US guided biopsy on 22 which showed invasive poorly differentiated ductal carcinoma, taylor score 8/9 (3+3+2), invasive tumor at least 0.8cm, microcalcifications present, no LVI.  ER-/AZ-, HER2 equivocal pending  CISH. \par \par MRI breast 22: \par 1. left breast 3:00 axis biopsy proven cancer with associated 1.4 cm mass enhancement. \par 2. Additional adjacent contiguous large area of linear non mass enhancement seen extending from the biopsy site into retroareolar/central/posterior left breast, up to 9.4 cm in AP dimension. \par 3. No suspicious enhancement in the right breast \par 4. No lymphadenopathy \par (BIRADS 4, suspicious findings)\par \par CT C/A/P 2/15/22: Multiple mildly enlarged mediastinal and hilar nodes, increased in size compared to 7/10/2020. Underdistended urinary bladder with questionable wall thickening. \par \par Bone scan 2/15/22:  No scan evidence of osseous metastasis. Degenerative disease in the spine and major joints \par \par Left breast biopsy 3/1/22: Fibrocystic change including apocrine adenosis and blood clot and focal microcalcifications\par Right breast biopsy 3/1/22: Proliferative fibrocystic change including sclerosing adenosis with microcalcifications. \par \par PET/CT 3/2/22: \par 1. FDG avd focus in the left outer breast corresponds to the pt recent biopsy proven invasive poorly differentiated ductal carcinoma. A 4.4 x 2.3 cm left central breast hematoma with adjacent postprocedural changes as described above. \par 2. Indeterminate FDG avid left supraclavicular, mediastinal and hilar lymphadenopathy, new and/or enlarged since 7/10/20, The symmetric distribution of lymph nodes in the mediastinum and hilar regions suggests a benign etiology. Left supraclavicular lymph  node is accessible to an US guise percutaneous needle biopsy\par 3. A subcentimeter, minimally FDG avid left axillary lymph node is nonspecific. \par \par Left supraclavicular lymph node FNA 3/15/22: Negative for malignant cells\par \par 3/24/2022: MRI show an extensive area of enhancement area the biopsy site, CT (CAP) shows mediastinal adenopathy. Subsequent biopsies of the breast and the supraclavicular lymph node were negative. BRCA: no significant mutation. Plan for bilateral mastectomies & axillary sentinel node biopsy\par \par h/o arthritis, on duloxetine. \par \par Risk factors: Prior breast disease: as above.  Menarche: 14. Postmenopausal,  after hysterectomy.  4 pregnancies, 5 miscarriages, 4 living children. \par Age of first pregnancy 18.   Breast feedin year each child. Oral contraceptive use: yes, 5 years. Other hormone exposure: None. \par Sister leukemia dx'd age 40. Mother had multiple myeloma.  Brother stomach cancer age 68.  Daughter with CLL age 30.  Paternal 2nd cousin breast cancer age 49. Paternal 2nd cousin liver cancer age 40 had hepatitis. Family history is negative for cancer of the ovary, endometrium, prostate, pancreatic and melanoma. \par The patient is not of Ashkenazi ethnic background. Never had genetic testing.  \par \par HEALTH MAINTENANCE \par PCP Dr. Maty Nunez\par GI Dr. Guo, colonoscopy 5 years, no polyps, due now\par GYN no pap smears, s/p MARK, no vaginal bleeding or spotting\par DERM Dr. Richards and Dr. Ramsey at [place]; no hx of skin cancer\par s/p COVID vaccine Pfizer\par \par INTERVAL HISTORY:\par \par She is s/p Bilateral mastectomies with left axillary sentinel node biopsy on 22.  Pathology revealed:\par 1) RIGHT Breast: focal ADH\par 2) LEFT Breast: Invasive poorly differentiated ductal carcinoma (1.8 cm), DCIS, 2 benign left axillary lymph nodes (one of which was a sentinel node), negative margins.  T1cN0, ER-AZ-HER2 equivocal (POSITIVE by in situ hybridization).\par \par 5/3/22-  Patient seen for initial postop visit.  Left drains clotted w/ seroma (stripped with good drainage flow after), right drainst removed.\par \par 22- Patient notes sluggish drainage from left drains despite attempting to strip drains as instructed, with development of swelling. \par \par 5/10/22- left drain removed today \par \par 22- Seroma drained in office, I & D'ed for ~20cc. \par \par 2022. Pt seen Dr Fatima today in office due to reoccurring hematoma in ____ breast. She states it was drained by Dr Leon last week. She was supposed to follow up in 1 month to check hematoma. She complains of......\par

## 2022-07-13 ENCOUNTER — RESULT REVIEW (OUTPATIENT)
Age: 62
End: 2022-07-13

## 2022-07-13 ENCOUNTER — APPOINTMENT (OUTPATIENT)
Dept: INFUSION THERAPY | Facility: HOSPITAL | Age: 62
End: 2022-07-13

## 2022-07-13 ENCOUNTER — APPOINTMENT (OUTPATIENT)
Dept: HEMATOLOGY ONCOLOGY | Facility: CLINIC | Age: 62
End: 2022-07-13

## 2022-07-13 LAB
ALBUMIN SERPL ELPH-MCNC: 4.3 G/DL — SIGNIFICANT CHANGE UP (ref 3.3–5)
ALP SERPL-CCNC: 92 U/L — SIGNIFICANT CHANGE UP (ref 40–120)
ALT FLD-CCNC: 21 U/L — SIGNIFICANT CHANGE UP (ref 10–45)
ANION GAP SERPL CALC-SCNC: 9 MMOL/L — SIGNIFICANT CHANGE UP (ref 5–17)
AST SERPL-CCNC: 19 U/L — SIGNIFICANT CHANGE UP (ref 10–40)
BASOPHILS # BLD AUTO: 0.03 K/UL — SIGNIFICANT CHANGE UP (ref 0–0.2)
BASOPHILS NFR BLD AUTO: 0.8 % — SIGNIFICANT CHANGE UP (ref 0–2)
BILIRUB SERPL-MCNC: 0.2 MG/DL — SIGNIFICANT CHANGE UP (ref 0.2–1.2)
BUN SERPL-MCNC: 10 MG/DL — SIGNIFICANT CHANGE UP (ref 7–23)
CALCIUM SERPL-MCNC: 9.1 MG/DL — SIGNIFICANT CHANGE UP (ref 8.4–10.5)
CHLORIDE SERPL-SCNC: 101 MMOL/L — SIGNIFICANT CHANGE UP (ref 96–108)
CO2 SERPL-SCNC: 26 MMOL/L — SIGNIFICANT CHANGE UP (ref 22–31)
CREAT SERPL-MCNC: 0.47 MG/DL — LOW (ref 0.5–1.3)
EGFR: 108 ML/MIN/1.73M2 — SIGNIFICANT CHANGE UP
EOSINOPHIL # BLD AUTO: 0.07 K/UL — SIGNIFICANT CHANGE UP (ref 0–0.5)
EOSINOPHIL NFR BLD AUTO: 1.9 % — SIGNIFICANT CHANGE UP (ref 0–6)
GLUCOSE SERPL-MCNC: 147 MG/DL — HIGH (ref 70–99)
HCT VFR BLD CALC: 34.5 % — SIGNIFICANT CHANGE UP (ref 34.5–45)
HGB BLD-MCNC: 11.4 G/DL — LOW (ref 11.5–15.5)
IMM GRANULOCYTES NFR BLD AUTO: 1.1 % — SIGNIFICANT CHANGE UP (ref 0–1.5)
LYMPHOCYTES # BLD AUTO: 1.64 K/UL — SIGNIFICANT CHANGE UP (ref 1–3.3)
LYMPHOCYTES # BLD AUTO: 45.2 % — HIGH (ref 13–44)
MCHC RBC-ENTMCNC: 27.5 PG — SIGNIFICANT CHANGE UP (ref 27–34)
MCHC RBC-ENTMCNC: 33 G/DL — SIGNIFICANT CHANGE UP (ref 32–36)
MCV RBC AUTO: 83.3 FL — SIGNIFICANT CHANGE UP (ref 80–100)
MONOCYTES # BLD AUTO: 0.25 K/UL — SIGNIFICANT CHANGE UP (ref 0–0.9)
MONOCYTES NFR BLD AUTO: 6.9 % — SIGNIFICANT CHANGE UP (ref 2–14)
NEUTROPHILS # BLD AUTO: 1.6 K/UL — LOW (ref 1.8–7.4)
NEUTROPHILS NFR BLD AUTO: 44.1 % — SIGNIFICANT CHANGE UP (ref 43–77)
NRBC # BLD: 0 /100 WBCS — SIGNIFICANT CHANGE UP (ref 0–0)
PLATELET # BLD AUTO: 266 K/UL — SIGNIFICANT CHANGE UP (ref 150–400)
POTASSIUM SERPL-MCNC: 4.4 MMOL/L — SIGNIFICANT CHANGE UP (ref 3.5–5.3)
POTASSIUM SERPL-SCNC: 4.4 MMOL/L — SIGNIFICANT CHANGE UP (ref 3.5–5.3)
PROT SERPL-MCNC: 6.7 G/DL — SIGNIFICANT CHANGE UP (ref 6–8.3)
RBC # BLD: 4.14 M/UL — SIGNIFICANT CHANGE UP (ref 3.8–5.2)
RBC # FLD: 15.6 % — HIGH (ref 10.3–14.5)
SODIUM SERPL-SCNC: 137 MMOL/L — SIGNIFICANT CHANGE UP (ref 135–145)
WBC # BLD: 3.63 K/UL — LOW (ref 3.8–10.5)
WBC # FLD AUTO: 3.63 K/UL — LOW (ref 3.8–10.5)

## 2022-07-18 ENCOUNTER — APPOINTMENT (OUTPATIENT)
Dept: CARDIOLOGY | Facility: CLINIC | Age: 62
End: 2022-07-18

## 2022-07-18 ENCOUNTER — RESULT REVIEW (OUTPATIENT)
Age: 62
End: 2022-07-18

## 2022-07-18 VITALS
TEMPERATURE: 97.2 F | RESPIRATION RATE: 16 BRPM | DIASTOLIC BLOOD PRESSURE: 80 MMHG | WEIGHT: 148.81 LBS | BODY MASS INDEX: 27.22 KG/M2 | SYSTOLIC BLOOD PRESSURE: 132 MMHG | HEART RATE: 72 BPM | OXYGEN SATURATION: 97 %

## 2022-07-18 LAB
ALBUMIN SERPL ELPH-MCNC: 4.5 G/DL
ALP BLD-CCNC: 92 U/L
ALT SERPL-CCNC: 21 U/L
ANION GAP SERPL CALC-SCNC: 10 MMOL/L
AST SERPL-CCNC: 18 U/L
BASOPHILS # BLD AUTO: 0.02 K/UL — SIGNIFICANT CHANGE UP (ref 0–0.2)
BASOPHILS NFR BLD AUTO: 0.6 % — SIGNIFICANT CHANGE UP (ref 0–2)
BILIRUB SERPL-MCNC: 0.2 MG/DL
BUN SERPL-MCNC: 13 MG/DL
CALCIUM SERPL-MCNC: 9.9 MG/DL
CHLORIDE SERPL-SCNC: 98 MMOL/L
CHOLEST SERPL-MCNC: 174 MG/DL
CO2 SERPL-SCNC: 28 MMOL/L
CREAT SERPL-MCNC: 0.6 MG/DL
CREAT SPEC-SCNC: 68 MG/DL
EGFR: 101 ML/MIN/1.73M2
EOSINOPHIL # BLD AUTO: 0.04 K/UL — SIGNIFICANT CHANGE UP (ref 0–0.5)
EOSINOPHIL NFR BLD AUTO: 1.3 % — SIGNIFICANT CHANGE UP (ref 0–6)
ESTIMATED AVERAGE GLUCOSE: 131 MG/DL
GLUCOSE SERPL-MCNC: 123 MG/DL
HBA1C MFR BLD HPLC: 6.2 %
HCT VFR BLD CALC: 34.9 % — SIGNIFICANT CHANGE UP (ref 34.5–45)
HDLC SERPL-MCNC: 43 MG/DL
HGB BLD-MCNC: 11.2 G/DL — LOW (ref 11.5–15.5)
IMM GRANULOCYTES NFR BLD AUTO: 0.6 % — SIGNIFICANT CHANGE UP (ref 0–1.5)
LDLC SERPL CALC-MCNC: 92 MG/DL
LYMPHOCYTES # BLD AUTO: 1.26 K/UL — SIGNIFICANT CHANGE UP (ref 1–3.3)
LYMPHOCYTES # BLD AUTO: 40 % — SIGNIFICANT CHANGE UP (ref 13–44)
MCHC RBC-ENTMCNC: 27.3 PG — SIGNIFICANT CHANGE UP (ref 27–34)
MCHC RBC-ENTMCNC: 32.1 G/DL — SIGNIFICANT CHANGE UP (ref 32–36)
MCV RBC AUTO: 85.1 FL — SIGNIFICANT CHANGE UP (ref 80–100)
MICROALBUMIN 24H UR DL<=1MG/L-MCNC: 1.7 MG/DL
MICROALBUMIN/CREAT 24H UR-RTO: 25 MG/G
MONOCYTES # BLD AUTO: 0.26 K/UL — SIGNIFICANT CHANGE UP (ref 0–0.9)
MONOCYTES NFR BLD AUTO: 8.3 % — SIGNIFICANT CHANGE UP (ref 2–14)
NEUTROPHILS # BLD AUTO: 1.55 K/UL — LOW (ref 1.8–7.4)
NEUTROPHILS NFR BLD AUTO: 49.2 % — SIGNIFICANT CHANGE UP (ref 43–77)
NONHDLC SERPL-MCNC: 130 MG/DL
NRBC # BLD: 0 /100 WBCS — SIGNIFICANT CHANGE UP (ref 0–0)
NT-PROBNP SERPL-MCNC: 33 PG/ML
PLATELET # BLD AUTO: 289 K/UL — SIGNIFICANT CHANGE UP (ref 150–400)
POTASSIUM SERPL-SCNC: 4 MMOL/L
PROT SERPL-MCNC: 7.1 G/DL
RBC # BLD: 4.1 M/UL — SIGNIFICANT CHANGE UP (ref 3.8–5.2)
RBC # FLD: 15.8 % — HIGH (ref 10.3–14.5)
SODIUM SERPL-SCNC: 135 MMOL/L
TRIGL SERPL-MCNC: 194 MG/DL
WBC # BLD: 3.15 K/UL — LOW (ref 3.8–10.5)
WBC # FLD AUTO: 3.15 K/UL — LOW (ref 3.8–10.5)

## 2022-07-18 PROCEDURE — 93010 ELECTROCARDIOGRAM REPORT: CPT

## 2022-07-18 PROCEDURE — 93000 ELECTROCARDIOGRAM COMPLETE: CPT

## 2022-07-18 PROCEDURE — 99215 OFFICE O/P EST HI 40 MIN: CPT | Mod: 25

## 2022-07-18 RX ORDER — DULOXETINE HYDROCHLORIDE 30 MG/1
30 CAPSULE, DELAYED RELEASE PELLETS ORAL TWICE DAILY
Qty: 60 | Refills: 3 | Status: COMPLETED | COMMUNITY
End: 2022-07-18

## 2022-07-18 RX ORDER — FLUTICASONE PROPIONATE AND SALMETEROL 250; 50 UG/1; UG/1
250-50 POWDER RESPIRATORY (INHALATION)
Qty: 3 | Refills: 1 | Status: DISCONTINUED | COMMUNITY
Start: 2022-04-01 | End: 2022-07-18

## 2022-07-18 RX ORDER — PAROXETINE HYDROCHLORIDE 10 MG/1
10 TABLET, FILM COATED ORAL DAILY
Refills: 0 | Status: COMPLETED | COMMUNITY
End: 2022-07-18

## 2022-07-18 NOTE — REASON FOR VISIT
[FreeTextEntry3] : Dr. Abernathy [FreeTextEntry1] : ARI LOYA is a 62 year woman with a history of right sided invasive ductal carcinoma (triple negative) treated with lumpectomy, adjuvant TH and radiation (6000 cGy) in 2011, now with left intraductal carcinoma here for follow up. \par \par Prior Cancer Treatments:\par ------------------------------------------------------------------------\par Chemo/targeted therapy:\par 2011: adjuvant TH\par 5/2022: adjuvant TH\par ------------------------------------------------------------------------\par Surgery:\par 2011: R lumpectomy\par 4/25/2022: Bilateral mastectomies with left axillary sentinel node biopsy\par ------------------------------------------------------------------------\par Radiation:\par 11/9/11-12/21/11: IMRT with 5000cGy in 25 fractions followed by 1000 cGy boost to cavity\par

## 2022-07-18 NOTE — PHYSICAL EXAM
[Normal] : well developed, well nourished, no acute distress [Normal Conjunctiva] : normal conjunctiva [No Xanthelasma] : no xanthelasma [Normal Venous Pressure] : normal venous pressure [No Carotid Bruit] : no carotid bruit [Normal S1, S2] : normal S1, S2 [No Murmur] : no murmur [No Rub] : no rub [No Gallop] : no gallop [Clear Lung Fields] : clear lung fields [Good Air Entry] : good air entry [No Respiratory Distress] : no respiratory distress  [Soft] : abdomen soft [Normal Gait] : normal gait [Gait - Sufficient for Exercise Testing] : gait - sufficient for exercise testing [Normal DP B/L] : normal DP B/L [Edema ___] : edema [unfilled] [Venous varicosities] : venous varicosities [No Rash] : no rash [Moves all extremities] : moves all extremities [Alert and Oriented] : alert and oriented

## 2022-07-18 NOTE — DISCUSSION/SUMMARY
[FreeTextEntry1] : 62 year old woman with recurrent breast cancer currently on adjuvant HER-2 targeted therapy (TH) with complaints of dyspnea on exertion and lower extremity edema.\par \par Will check BNP, urine protein today, and refer for exercise stress test (echo) for dyspnea on exertion, given risk factors. (Today she denied any prior stress test).\par \par Venous insufficiency with varicose veins:\par - labs as above\par - advised trial of compression stockings\par - vascular medicine referral provided\par \par Hypertension: BP elevated, but reportedly intolerant of antihypertensive therapy previously\par - continue spironolactone 12.5 mg daily for now\par - adjust pending labs and stress test results given HTN increases risk for HER2 cardiotoxicity\par \par Risk for cardiotoxicity during HER2 targeted therapy\par - optimize risk factors\par - continue screening with serial echo + GLS every 3 months\par - BNP for risk stratification as above\par - follow up in 3 months with me\par \par Above recommendations discussed with the patient and her daughter and all questions answered to the best of my ability and to their apparent satisfaction.\par Total time spent on today's encounter was 45 minutes. >50% time spent in counseling and coordination of care and on addressing above medical conditions in assessment.\par \par   [EKG obtained to assist in diagnosis and management of assessed problem(s)] : EKG obtained to assist in diagnosis and management of assessed problem(s)

## 2022-07-18 NOTE — REVIEW OF SYSTEMS
[Dyspnea on exertion] : dyspnea during exertion [Lower Ext Edema] : lower extremity edema [Numbness (Hypoesthesia)] : numbness [Tingling (Paresthesia)] : tingling [Depression] : no depression [Negative] : Psychiatric [de-identified] : on left side.  [de-identified] : epistaxis

## 2022-07-18 NOTE — HISTORY OF PRESENT ILLNESS
[FreeTextEntry1] : Interval History:\feng Returns for follow up with her daughter Shobha. Complains of bilateral lower extremity edema, worse since starting taxol/Herceptin, but present for many years prior. Still with CLARK after a few blocks. Had echo prior to starting trastuzumab - normal EF and GLS and no structural heart disease. Notes painful varicose veins and reports a procedure was performed on the left leg for this previously. A urologist started her spironolactone for a "kidney problem, edema, and HTN." She takes spironolactone 12.5 mg daily, minimal improvement in edema. Had used compression in the past, but not recently. No chest pain. No palpitations. No orthopnea.\par \par Higher doses of BP meds caused low BP she relays.\par \par Needs repeat testing for sleep apnea in order to get CPAP.\par \par \par History:\par T2N0M0- invasive ductal carcinoma. ER/IA negative, Her2 negative. She was treated with 4 cycles of adjuvant TC along with neulasta support. Her treatment with complicated by bone pains due to the neulasta. She then received IMRT with 5000cGy in 25 fractions followed by boost to the cavity of 1000cGy in 5 fractions- this was done from 11/9/11-12/21/11.\par \par She has a history of hypertension, ELVIN not able to tolerate CPAP, and chronic dyspnea on exertion for which she saw Pulmonologist last week. She denies any chest pain. She has a history of syncope both on hot days in setting of dehydration she says. No known cardiovascular disease. She reports and treadmill exercise stress test done a few months ago by Dr. Whitney at [facility] was normal. She can complete the flight of stairs up to her apartment without stopping.\par \par She has no history of diabetes, cigarette smoking. Several family members had heart disease - her brother had MI in late 50s. Another brother had a pacemaker placed. A daughter required surgery for valve problem.\par \par \par Cardiovascular Testing:\par ---------------------------------------\par ECG:\par 7/18/2022: NSR 71 bpm, normal ECG\par 3/31/2022: NSR and normal ECG\par  ---------------------------------------\par Echo:\par 5/20/2022: EF 60-65 %, -29.3 %, mild MR, normal aortic valve\par ---------------------------------------\par CT\par CT chest February 2022: hilar adenopathy, normal vessels. No CACs.

## 2022-07-19 ENCOUNTER — APPOINTMENT (OUTPATIENT)
Dept: HEMATOLOGY ONCOLOGY | Facility: CLINIC | Age: 62
End: 2022-07-19

## 2022-07-19 ENCOUNTER — RESULT REVIEW (OUTPATIENT)
Age: 62
End: 2022-07-19

## 2022-07-19 ENCOUNTER — NON-APPOINTMENT (OUTPATIENT)
Age: 62
End: 2022-07-19

## 2022-07-19 ENCOUNTER — APPOINTMENT (OUTPATIENT)
Dept: INFUSION THERAPY | Facility: HOSPITAL | Age: 62
End: 2022-07-19

## 2022-07-19 LAB
ALBUMIN SERPL ELPH-MCNC: 4.2 G/DL — SIGNIFICANT CHANGE UP (ref 3.3–5)
ALP SERPL-CCNC: 90 U/L — SIGNIFICANT CHANGE UP (ref 40–120)
ALT FLD-CCNC: 22 U/L — SIGNIFICANT CHANGE UP (ref 10–45)
ANION GAP SERPL CALC-SCNC: 9 MMOL/L — SIGNIFICANT CHANGE UP (ref 5–17)
AST SERPL-CCNC: 22 U/L — SIGNIFICANT CHANGE UP (ref 10–40)
BASOPHILS # BLD AUTO: 0.05 K/UL — SIGNIFICANT CHANGE UP (ref 0–0.2)
BASOPHILS NFR BLD AUTO: 1.3 % — SIGNIFICANT CHANGE UP (ref 0–2)
BILIRUB SERPL-MCNC: 0.2 MG/DL — SIGNIFICANT CHANGE UP (ref 0.2–1.2)
BUN SERPL-MCNC: 16 MG/DL — SIGNIFICANT CHANGE UP (ref 7–23)
CALCIUM SERPL-MCNC: 9 MG/DL — SIGNIFICANT CHANGE UP (ref 8.4–10.5)
CHLORIDE SERPL-SCNC: 99 MMOL/L — SIGNIFICANT CHANGE UP (ref 96–108)
CO2 SERPL-SCNC: 25 MMOL/L — SIGNIFICANT CHANGE UP (ref 22–31)
CREAT SERPL-MCNC: 0.48 MG/DL — LOW (ref 0.5–1.3)
EGFR: 107 ML/MIN/1.73M2 — SIGNIFICANT CHANGE UP
EOSINOPHIL # BLD AUTO: 0.08 K/UL — SIGNIFICANT CHANGE UP (ref 0–0.5)
EOSINOPHIL NFR BLD AUTO: 2 % — SIGNIFICANT CHANGE UP (ref 0–6)
GLUCOSE SERPL-MCNC: 120 MG/DL — HIGH (ref 70–99)
HCT VFR BLD CALC: 31.9 % — LOW (ref 34.5–45)
HGB BLD-MCNC: 10.7 G/DL — LOW (ref 11.5–15.5)
IMM GRANULOCYTES NFR BLD AUTO: 1.3 % — SIGNIFICANT CHANGE UP (ref 0–1.5)
LYMPHOCYTES # BLD AUTO: 1.7 K/UL — SIGNIFICANT CHANGE UP (ref 1–3.3)
LYMPHOCYTES # BLD AUTO: 42.8 % — SIGNIFICANT CHANGE UP (ref 13–44)
MCHC RBC-ENTMCNC: 27.6 PG — SIGNIFICANT CHANGE UP (ref 27–34)
MCHC RBC-ENTMCNC: 33.5 G/DL — SIGNIFICANT CHANGE UP (ref 32–36)
MCV RBC AUTO: 82.2 FL — SIGNIFICANT CHANGE UP (ref 80–100)
MONOCYTES # BLD AUTO: 0.3 K/UL — SIGNIFICANT CHANGE UP (ref 0–0.9)
MONOCYTES NFR BLD AUTO: 7.6 % — SIGNIFICANT CHANGE UP (ref 2–14)
NEUTROPHILS # BLD AUTO: 1.79 K/UL — LOW (ref 1.8–7.4)
NEUTROPHILS NFR BLD AUTO: 45 % — SIGNIFICANT CHANGE UP (ref 43–77)
NRBC # BLD: 0 /100 WBCS — SIGNIFICANT CHANGE UP (ref 0–0)
PLATELET # BLD AUTO: 280 K/UL — SIGNIFICANT CHANGE UP (ref 150–400)
POTASSIUM SERPL-MCNC: 3.6 MMOL/L — SIGNIFICANT CHANGE UP (ref 3.5–5.3)
POTASSIUM SERPL-SCNC: 3.6 MMOL/L — SIGNIFICANT CHANGE UP (ref 3.5–5.3)
PROT SERPL-MCNC: 6.5 G/DL — SIGNIFICANT CHANGE UP (ref 6–8.3)
RBC # BLD: 3.88 M/UL — SIGNIFICANT CHANGE UP (ref 3.8–5.2)
RBC # FLD: 15.9 % — HIGH (ref 10.3–14.5)
SODIUM SERPL-SCNC: 133 MMOL/L — LOW (ref 135–145)
WBC # BLD: 3.97 K/UL — SIGNIFICANT CHANGE UP (ref 3.8–10.5)
WBC # FLD AUTO: 3.97 K/UL — SIGNIFICANT CHANGE UP (ref 3.8–10.5)

## 2022-07-21 NOTE — PROCEDURE
[Ultrasonic Guidance] : ultrasound guidance was not employed [de-identified] : 10 cc hematoma I and d'ed

## 2022-07-22 NOTE — ASSESSMENT
[FreeTextEntry1] : ARI LOYA  is a 62 year old female with history of T2N0M0 (2.2cm), stage IIA RIGHT breast triple negative invasive ductal carcinoma, s/p adjuvant TC chemotherapy and RT.  She is here for newly diagnosed contralateral left invasive poorly differentiated ductal carcinoma, cT1cN0, ER negative <1%, MS negative <1%, HER2 positive. \par \par -patient receiving Week #5 of Taxol + weekly Herceptin\par -tolerating well, cbc reviewed ok to proceed with treatment\par -resolving hematoma; continue f/up with Dr. Leon to ensure ongoing resolution \par -continue with supportive medications\par -I encouraged her to call me with any additional questions. \par - f/up 1 weeks\par

## 2022-07-22 NOTE — PHYSICAL EXAM
[Restricted in physically strenuous activity but ambulatory and able to carry out work of a light or sedentary nature] : Status 1- Restricted in physically strenuous activity but ambulatory and able to carry out work of a light or sedentary nature, e.g., light house work, office work [Normal] : affect appropriate [de-identified] : s/p b/l mastectomy without reconstruction; right chest wall no erythema or swelling; Left surgical incision draining serous fluid, erythema and swelling improved, no evidence of infection [de-identified] : Left upper extremity - pain with extension noted and numbness tingling in left hand with extension; no weakness noted on exam in upper extremities [de-identified] : see above

## 2022-07-22 NOTE — HISTORY OF PRESENT ILLNESS
[de-identified] : In 2011, pt was diagnosed with T2N0M0 (2.2cm), stage IIA RIGHT breast triple negative invasive ductal carcinoma with Taylor score 8 to 9.  She underwent lumpectomy Dr. Keyon Amezquita and then received adjuvant chemotherapy with taxotere and cytoxan for 4 cycles.  She then received IMRT with 5000cGy in 25 fractions followed by boost to the cavity of 1000cGy in 5 fractions 11/9/11-12/21/11 with Dr. Veena Walls. \par \par Pt had benign right breast stereotactic biopsy for calcifications in 7/26/2012 and s/p benign left breast US guided core biopsy 9/7/2018 (4-5:00).\par \par In 8/13/2020, screening mammogram/US showed calcifications in left mid breast as well as scattered and occasionally loosely grouped calcifications of central breast, 6mos follow up recommended. \par \par On 3/18/2021, follow up left breast mammogram showed probably benign calcifications in anterior left breast without significant change since 8/13/2020. FU in 8/2021 for stability.  \par \par In 12/28/21, bilateral mammo/US showed left breast 2-3:00 4cmfn 1.4cm irregular mass suspicious of malignancy, US core biopsy recommended; grouped heterogenous calcifications at right breast 10:00 anterior depth also suspicious for malignancy, bx recommended, BIRADS4. \par \par Pt underwent left breast 3:00 4cmfn US guided biopsy on 1/31/22 which showed invasive poorly differentiated ductal carcinoma, taylor score 8/9 (3+3+2), invasive tumor at least 0.8cm, microcalcifications present, no LVI.  ER negative <1%, OR negative <1%, HER2 2+ IHC, CISH pending. \par \par MRI breast on 2/14/22 showed leftbreast 3:00 biopsy proven cancer associatedwith 1.4cmmass enhancement;adjacent continguous large area oflinerar nonmassenhancement seen extending from biopsysite into retroareolar/central/posterior left breast upto9.4cm,2 site MRI biopsy rec if alter surgical mgt;nosuspiciousenhancementin rightbrast,No ALD; [id number].\par \par MRI biopsyon 3/1/22 of both areas showedfibrocystic changes including sclerosing adenosis \par \par CT C/A/P 2/15/22 showedmultiple mildly enlargedmediastinal andhilarLNs,increasedn size compaerdto7/10/2020. \par Same day bone scan negativefor osseous metastasis. \par \par PET scan 3/2/22 showed knownleftbreast cancer,adjacent 4.4cm hematoma, indeterminate FDG avid leftsupraclavicular, mediastinal and hilar LAD new and/or enhlargedsince7/10/2020, mediastinal and hilar LNs symmetric distrubution suggests abenign etiology;leftsupraclavicularLNaccess toUS guidedbiopsy; subcm minimallyFDGavidleftaxillary LN nonspecific,,maybebiopsies. \par \par Leftsupraclavicular LN US guided FNA was negatibve formalignantcells. on 3/15/22 showed\par \par Saw Dr. Anmol Sanford pulmonary on4/11/22. Followupafter surgery to assessas the mediastinal LNsunlikelyelatedtobreastcancer. \par \par 4/25/2022 - Dr. coreas performed lumpectomy: final surgical pathology as follows:\par 1.8 cm poorly differentiated IDC with two negative SN; ER 0%; OR 0%; HER 2 + by FISH\par \par Sister leukemia dx'd age 40. Mother had multiple myeloma.  Brother stomach cancer age 68.  Daughter with CLL age 30.  Paternal 2nd cousin breast cancer age 49. Paternal 2nd cousin liver cancer age 40 had hepatitis. Family history is negative for cancer of the ovary, endometrium, prostate, pancreatic and melanoma. \par The patient is not of Ashkenazi ethnic background. Never had genetic testing.  \par \par HEALTH MAINTENANCE \par PCP Dr. Maty Nunez\par GI Dr. Guo, colonoscopy 5 years, no polyps, due now\par GYN no pap smears, s/p MARK, no vaginal bleeding or spotting\par DERM Dr. Richards and Dr. Ramsey at [place]; no hx of skin cancer\par MSK h/o arthritis on duloxetine. \par s/p COVID vaccine Pfizer [de-identified] : 7/7/22 \par Patient presents today for herceptin + weekly taxol #5\par denies joint pains, chest pain, SOB, cough. \par c/o intermittent headaches but not worsening. \par left chest wall incision now draining serous fluid, erythema improved.  Seen by Dr. leon on 6/14/2022 and advised of hematoma which he felt was improving and reabsorbing. \par no fevers, no neuropathy of toes, no diarrhea, no constipation\par \par \par \par Pt underwent bilateral mastectomies on 4/25/22 with Dr. Leon.\par s/p EBUS w/ Dr. Sanford - biopsy negative for malignancy; + granulomatous inflammation\par Patient denies any SOB, CP, abdominal pain, bone pain, headache, or unexplained weight loss\par c/o left lower arm numbness/tingling since surgery; no associated weakness. \par + GERD on omeprazole am and pepcid qhs \par Port placed \par Echo 5/2022 EF 63%

## 2022-07-22 NOTE — REVIEW OF SYSTEMS
[Negative] : Allergic/Immunologic [FreeTextEntry7] : GERD [de-identified] : skin breast changes [de-identified] : see HPI

## 2022-07-22 NOTE — REASON FOR VISIT
[Follow-Up Visit] : a follow-up [Pre-Treatment Visit] : a pre-treatment [Family Member] : family member [Ad Hoc ] : provided by an ad hoc  [FreeTextEntry2] : breast cancer [FreeTextEntry3] : family member

## 2022-07-25 ENCOUNTER — APPOINTMENT (OUTPATIENT)
Dept: SURGICAL ONCOLOGY | Facility: CLINIC | Age: 62
End: 2022-07-25

## 2022-07-25 VITALS
HEART RATE: 68 BPM | BODY MASS INDEX: 27.23 KG/M2 | WEIGHT: 148 LBS | TEMPERATURE: 97.3 F | OXYGEN SATURATION: 98 % | DIASTOLIC BLOOD PRESSURE: 83 MMHG | SYSTOLIC BLOOD PRESSURE: 174 MMHG | RESPIRATION RATE: 16 BRPM | HEIGHT: 62 IN

## 2022-07-25 PROCEDURE — 99024 POSTOP FOLLOW-UP VISIT: CPT

## 2022-07-25 NOTE — HISTORY OF PRESENT ILLNESS
[de-identified] : ARI LOYA  is a 62 year old female here for a postop visit, s/p Bilateral mastectomies with left axillary sentinel node biopsy on 22.\par \par In , pt was diagnosed with T2N0M0 (2.2cm), stage IIA RIGHT breast triple negative invasive ductal carcinoma with Alexx score 8 to 9.  She underwent lumpectomy Dr. Keyon Amezquita and then received adjuvant chemotherapy with taxotere and cytoxan for 4 cycles.  She then received IMRT with 5000cGy in 25 fractions followed by boost to the cavity of 1000cGy in 5 fractions 11-11 with Dr. Veena Walls. \par \par Pt underwent left breast 3:00 4cmfn US guided biopsy on 22 which showed invasive poorly differentiated ductal carcinoma, Woodgate score 8/9 (3+3+2), invasive tumor at least 0.8cm, micro calcifications present, no LVI.  ER-/AK-, HER2 equivocal pending CISH. \par \par MRI show an extensive area of enhancement area the biopsy site, CT (CAP) shows mediastinal adenopathy. Subsequent biopsies of the breast and the supraclavicular lymph node were negative. BRCA: no significant mutation. Plan for bilateral mastectomies & axillary sentinel node biopsy\par \par She is s/p bilateral mastectomies with left axillary sentinel node biopsy on 22.  Pathology revealed:\par 1) RIGHT Breast: focal ADH\par 2) LEFT Breast: Invasive poorly differentiated ductal carcinoma (1.8 cm), DCIS, 2 benign left axillary lymph nodes (one of which was a sentinel node), negative margins.  T1cN0, ER-AK-HER2 equivocal (POSITIVE by in situ hybridization).\par \par Post-op had left breast hematoma that was drained in office and was noted to be resorbing when seen by my colleague, Dr Fatima. \par \par \par PERTINENT INFO:\par Risk factors: Prior breast disease: as above.  Menarche: 14. Postmenopausal,  after hysterectomy.  4 pregnancies, 5 miscarriages, 4 living children. \par Age of first pregnancy 18.   Breast feedin year each child. Oral contraceptive use: yes, 5 years. Other hormone exposure: None. \par Sister leukemia dx'd age 40. Mother had multiple myeloma.  Brother stomach cancer age 68.  Daughter with CLL age 30.  Paternal 2nd cousin breast cancer age 49. Paternal 2nd cousin liver cancer age 40 had hepatitis. Family history is negative for cancer of the ovary, endometrium, prostate, pancreatic and melanoma. The patient is not of Ashkenazi ethnic background. Never had genetic testing.  \par \par HEALTH MAINTENANCE \par PCP Dr. Maty Nunez\par GI Dr. Guo, colonoscopy 5 years, no polyps, due now\par GYN no pap smears, s/p MARK, no vaginal bleeding or spotting\par DERM Dr. Richards and Dr. Ramsey at [place]; no hx of skin cancer\par s/p COVID vaccine Pfizer

## 2022-07-25 NOTE — ASSESSMENT
[FreeTextEntry1] : IMP: \par \par She is s/p Bilateral mastectomies with left axillary sentinel node biopsy on 4/25/22.  \par Pathology revealed:\par 1) RIGHT Breast: focal ADH\par 2) LEFT Breast: Invasive poorly differentiated ductal carcinoma (1.8 cm), DCIS, 2 benign left axillary lymph nodes (one of which was a sentinel node), negative margins.  T1cN0, ER-LA-HER2 equivocal (POSITIVE by in situ hybridization).\par \par Adjuvant chemotherapy started w/ Dr. Abernathy\par Hematoma re-absorbing\par cellulitis on left chest wall\par \par \par PLAN:\par Duricef 500 mg twice daily\par RTO one week; then q 3 months\par

## 2022-07-26 ENCOUNTER — APPOINTMENT (OUTPATIENT)
Dept: SURGICAL ONCOLOGY | Facility: CLINIC | Age: 62
End: 2022-07-26

## 2022-07-26 ENCOUNTER — APPOINTMENT (OUTPATIENT)
Dept: HEMATOLOGY ONCOLOGY | Facility: CLINIC | Age: 62
End: 2022-07-26

## 2022-07-26 ENCOUNTER — RESULT REVIEW (OUTPATIENT)
Age: 62
End: 2022-07-26

## 2022-07-26 ENCOUNTER — APPOINTMENT (OUTPATIENT)
Dept: INFUSION THERAPY | Facility: HOSPITAL | Age: 62
End: 2022-07-26

## 2022-07-26 LAB
ALBUMIN SERPL ELPH-MCNC: 4 G/DL — SIGNIFICANT CHANGE UP (ref 3.3–5)
ALP SERPL-CCNC: 89 U/L — SIGNIFICANT CHANGE UP (ref 40–120)
ALT FLD-CCNC: 22 U/L — SIGNIFICANT CHANGE UP (ref 10–45)
ANION GAP SERPL CALC-SCNC: 10 MMOL/L — SIGNIFICANT CHANGE UP (ref 5–17)
AST SERPL-CCNC: 20 U/L — SIGNIFICANT CHANGE UP (ref 10–40)
BASOPHILS # BLD AUTO: 0.04 K/UL — SIGNIFICANT CHANGE UP (ref 0–0.2)
BASOPHILS NFR BLD AUTO: 1 % — SIGNIFICANT CHANGE UP (ref 0–2)
BILIRUB SERPL-MCNC: <0.2 MG/DL — SIGNIFICANT CHANGE UP (ref 0.2–1.2)
BUN SERPL-MCNC: 15 MG/DL — SIGNIFICANT CHANGE UP (ref 7–23)
CALCIUM SERPL-MCNC: 9 MG/DL — SIGNIFICANT CHANGE UP (ref 8.4–10.5)
CHLORIDE SERPL-SCNC: 100 MMOL/L — SIGNIFICANT CHANGE UP (ref 96–108)
CO2 SERPL-SCNC: 24 MMOL/L — SIGNIFICANT CHANGE UP (ref 22–31)
CREAT SERPL-MCNC: 0.5 MG/DL — SIGNIFICANT CHANGE UP (ref 0.5–1.3)
EGFR: 106 ML/MIN/1.73M2 — SIGNIFICANT CHANGE UP
EOSINOPHIL # BLD AUTO: 0.1 K/UL — SIGNIFICANT CHANGE UP (ref 0–0.5)
EOSINOPHIL NFR BLD AUTO: 2.5 % — SIGNIFICANT CHANGE UP (ref 0–6)
GLUCOSE SERPL-MCNC: 147 MG/DL — HIGH (ref 70–99)
HCT VFR BLD CALC: 29.3 % — LOW (ref 34.5–45)
HGB BLD-MCNC: 10 G/DL — LOW (ref 11.5–15.5)
IMM GRANULOCYTES NFR BLD AUTO: 2.3 % — HIGH (ref 0–1.5)
LYMPHOCYTES # BLD AUTO: 1.6 K/UL — SIGNIFICANT CHANGE UP (ref 1–3.3)
LYMPHOCYTES # BLD AUTO: 40.2 % — SIGNIFICANT CHANGE UP (ref 13–44)
MCHC RBC-ENTMCNC: 28 PG — SIGNIFICANT CHANGE UP (ref 27–34)
MCHC RBC-ENTMCNC: 34.1 G/DL — SIGNIFICANT CHANGE UP (ref 32–36)
MCV RBC AUTO: 82.1 FL — SIGNIFICANT CHANGE UP (ref 80–100)
MONOCYTES # BLD AUTO: 0.28 K/UL — SIGNIFICANT CHANGE UP (ref 0–0.9)
MONOCYTES NFR BLD AUTO: 7 % — SIGNIFICANT CHANGE UP (ref 2–14)
NEUTROPHILS # BLD AUTO: 1.87 K/UL — SIGNIFICANT CHANGE UP (ref 1.8–7.4)
NEUTROPHILS NFR BLD AUTO: 47 % — SIGNIFICANT CHANGE UP (ref 43–77)
NRBC # BLD: 0 /100 WBCS — SIGNIFICANT CHANGE UP (ref 0–0)
PLATELET # BLD AUTO: 242 K/UL — SIGNIFICANT CHANGE UP (ref 150–400)
POTASSIUM SERPL-MCNC: 4 MMOL/L — SIGNIFICANT CHANGE UP (ref 3.5–5.3)
POTASSIUM SERPL-SCNC: 4 MMOL/L — SIGNIFICANT CHANGE UP (ref 3.5–5.3)
PROT SERPL-MCNC: 6.3 G/DL — SIGNIFICANT CHANGE UP (ref 6–8.3)
RBC # BLD: 3.57 M/UL — LOW (ref 3.8–5.2)
RBC # FLD: 16.2 % — HIGH (ref 10.3–14.5)
SODIUM SERPL-SCNC: 134 MMOL/L — LOW (ref 135–145)
WBC # BLD: 3.98 K/UL — SIGNIFICANT CHANGE UP (ref 3.8–10.5)
WBC # FLD AUTO: 3.98 K/UL — SIGNIFICANT CHANGE UP (ref 3.8–10.5)

## 2022-07-27 ENCOUNTER — OUTPATIENT (OUTPATIENT)
Dept: OUTPATIENT SERVICES | Facility: HOSPITAL | Age: 62
LOS: 1 days | Discharge: HOME | End: 2022-07-27

## 2022-07-27 DIAGNOSIS — Z92.21 PERSONAL HISTORY OF ANTINEOPLASTIC CHEMOTHERAPY: Chronic | ICD-10-CM

## 2022-07-27 DIAGNOSIS — C50.919 MALIGNANT NEOPLASM OF UNSPECIFIED SITE OF UNSPECIFIED FEMALE BREAST: ICD-10-CM

## 2022-07-27 DIAGNOSIS — Z98.890 OTHER SPECIFIED POSTPROCEDURAL STATES: Chronic | ICD-10-CM

## 2022-07-27 DIAGNOSIS — Z90.710 ACQUIRED ABSENCE OF BOTH CERVIX AND UTERUS: Chronic | ICD-10-CM

## 2022-07-28 ENCOUNTER — RX RENEWAL (OUTPATIENT)
Age: 62
End: 2022-07-28

## 2022-08-01 ENCOUNTER — APPOINTMENT (OUTPATIENT)
Dept: SURGICAL ONCOLOGY | Facility: CLINIC | Age: 62
End: 2022-08-01

## 2022-08-01 VITALS
HEART RATE: 69 BPM | WEIGHT: 150 LBS | BODY MASS INDEX: 27.6 KG/M2 | HEIGHT: 62 IN | DIASTOLIC BLOOD PRESSURE: 82 MMHG | TEMPERATURE: 97.9 F | OXYGEN SATURATION: 98 % | RESPIRATION RATE: 17 BRPM | SYSTOLIC BLOOD PRESSURE: 162 MMHG

## 2022-08-01 PROCEDURE — 99213 OFFICE O/P EST LOW 20 MIN: CPT

## 2022-08-01 NOTE — HISTORY OF PRESENT ILLNESS
[de-identified] : ARI LOYA  is a 62 year old female here for a follow-up visit, s/p Bilateral mastectomies with left axillary sentinel node biopsy on 22.\par \par In , pt was diagnosed with T2N0M0 (2.2cm), stage IIA RIGHT breast triple negative invasive ductal carcinoma with Alexx score 8 to 9.  She underwent lumpectomy Dr. Keyon Amezquita and then received adjuvant chemotherapy with taxotere and cytoxan for 4 cycles.  She then received IMRT with 5000cGy in 25 fractions followed by boost to the cavity of 1000cGy in 5 fractions 11-11 with Dr. Veena Walls. \par \par Pt underwent left breast 3:00 4cmfn US guided biopsy on 22 which showed invasive poorly differentiated ductal carcinoma, [place] Cruz score 8/9 (3+3+2), invasive tumor at least 0.8cm, micro calcifications present, no LVI.  ER-/HI-, HER2 equivocal pending CISH. \par \par MRI show an extensive area of enhancement area the biopsy site, CT (CAP) shows mediastinal adenopathy. Subsequent biopsies of the breast and the supraclavicular lymph node were negative. BRCA: no significant mutation. Plan for bilateral mastectomies & axillary sentinel node biopsy\par \par She is s/p bilateral mastectomies with left axillary sentinel node biopsy on 22.  Pathology revealed:\par 1) RIGHT Breast: focal ADH\par 2) LEFT Breast: Invasive poorly differentiated ductal carcinoma (1.8 cm), DCIS, 2 benign left axillary lymph nodes (one of which was a sentinel node), negative margins.  T1cN0, ER-HI-HER2 equivocal (POSITIVE by in situ hybridization).\par \par Post-op had left breast hematoma that was drained in office and was noted to be resorbing when seen by my colleague, Dr Fatima. \par \par \par PERTINENT INFO:\par Risk factors: Prior breast disease: as above.  Menarche: 14. Postmenopausal,  after hysterectomy.  4 pregnancies, 5 miscarriages, 4 living children. \par Age of first pregnancy 18.   Breast feedin year each child. Oral contraceptive use: yes, 5 years. Other hormone exposure: None. \par Sister leukemia dx'd age 40. Mother had multiple myeloma.  Brother stomach cancer age 68.  Daughter with CLL age 30.  Paternal 2nd cousin breast cancer age 49. Paternal 2nd cousin liver cancer age 40 had hepatitis. Family history is negative for cancer of the ovary, endometrium, prostate, pancreatic and melanoma. The patient is not of Ashkenazi ethnic background. Never had genetic testing.  \par \par HEALTH MAINTENANCE \par PCP Dr. Maty Nunez\par GI Dr. Guo, colonoscopy 5 years, no polyps, due now\par GYN no pap smears, s/p MARK, no vaginal bleeding or spotting\par DERM Dr. Richards and Dr. Ramsey at [place]; no hx of skin cancer\par s/p COVID vaccine Pfizer

## 2022-08-01 NOTE — REASON FOR VISIT
[Follow-Up Visit] : a follow-up visit for [FreeTextEntry2] : s/p b/l mastectomies w/left axillary sentinel node biopsy 4/25/22

## 2022-08-01 NOTE — ASSESSMENT
[FreeTextEntry1] : IMP: \par \par She is s/p Bilateral mastectomies with left axillary sentinel node biopsy on 4/25/22.  \par Pathology revealed:\par 1) RIGHT Breast: focal ADH\par 2) LEFT Breast: Invasive poorly differentiated ductal carcinoma (1.8 cm), DCIS, 2 benign left axillary lymph nodes (one of which was a sentinel node), negative margins.  T1cN0, ER-IN-HER2 equivocal (POSITIVE by in situ hybridization).\par \par Adjuvant chemotherapy started w/ Dr. Abernathy (she is leaving the practice, at that time Gurwinder will be under the c/o Dr. Linnette Ortega)\par \par Hematoma re-absorbing\par cellulitis on left chest wall 7/25/22 improving, gave Duricef 500 mg BID \par \par \par PLAN:\par RTO Q3 months\par

## 2022-08-02 ENCOUNTER — APPOINTMENT (OUTPATIENT)
Dept: HEMATOLOGY ONCOLOGY | Facility: CLINIC | Age: 62
End: 2022-08-02

## 2022-08-02 ENCOUNTER — RESULT REVIEW (OUTPATIENT)
Age: 62
End: 2022-08-02

## 2022-08-02 ENCOUNTER — APPOINTMENT (OUTPATIENT)
Dept: INFUSION THERAPY | Facility: HOSPITAL | Age: 62
End: 2022-08-02

## 2022-08-02 VITALS
BODY MASS INDEX: 28.2 KG/M2 | DIASTOLIC BLOOD PRESSURE: 75 MMHG | SYSTOLIC BLOOD PRESSURE: 113 MMHG | HEIGHT: 62.01 IN | WEIGHT: 155.21 LBS | RESPIRATION RATE: 16 BRPM | OXYGEN SATURATION: 99 % | TEMPERATURE: 97.5 F | HEART RATE: 76 BPM

## 2022-08-02 LAB
ALBUMIN SERPL ELPH-MCNC: 4.2 G/DL — SIGNIFICANT CHANGE UP (ref 3.3–5)
ALP SERPL-CCNC: 105 U/L — SIGNIFICANT CHANGE UP (ref 40–120)
ALT FLD-CCNC: 30 U/L — SIGNIFICANT CHANGE UP (ref 10–45)
ANION GAP SERPL CALC-SCNC: 11 MMOL/L — SIGNIFICANT CHANGE UP (ref 5–17)
AST SERPL-CCNC: 23 U/L — SIGNIFICANT CHANGE UP (ref 10–40)
BASOPHILS # BLD AUTO: 0.04 K/UL — SIGNIFICANT CHANGE UP (ref 0–0.2)
BASOPHILS NFR BLD AUTO: 0.9 % — SIGNIFICANT CHANGE UP (ref 0–2)
BILIRUB SERPL-MCNC: 0.2 MG/DL — SIGNIFICANT CHANGE UP (ref 0.2–1.2)
BUN SERPL-MCNC: 13 MG/DL — SIGNIFICANT CHANGE UP (ref 7–23)
CALCIUM SERPL-MCNC: 9.1 MG/DL — SIGNIFICANT CHANGE UP (ref 8.4–10.5)
CHLORIDE SERPL-SCNC: 96 MMOL/L — SIGNIFICANT CHANGE UP (ref 96–108)
CO2 SERPL-SCNC: 24 MMOL/L — SIGNIFICANT CHANGE UP (ref 22–31)
CREAT SERPL-MCNC: 0.54 MG/DL — SIGNIFICANT CHANGE UP (ref 0.5–1.3)
EGFR: 104 ML/MIN/1.73M2 — SIGNIFICANT CHANGE UP
EOSINOPHIL # BLD AUTO: 0.1 K/UL — SIGNIFICANT CHANGE UP (ref 0–0.5)
EOSINOPHIL NFR BLD AUTO: 2.3 % — SIGNIFICANT CHANGE UP (ref 0–6)
GLUCOSE SERPL-MCNC: 126 MG/DL — HIGH (ref 70–99)
HCT VFR BLD CALC: 32.8 % — LOW (ref 34.5–45)
HGB BLD-MCNC: 11.2 G/DL — LOW (ref 11.5–15.5)
IMM GRANULOCYTES NFR BLD AUTO: 2.1 % — HIGH (ref 0–1.5)
LYMPHOCYTES # BLD AUTO: 1.82 K/UL — SIGNIFICANT CHANGE UP (ref 1–3.3)
LYMPHOCYTES # BLD AUTO: 42 % — SIGNIFICANT CHANGE UP (ref 13–44)
MCHC RBC-ENTMCNC: 28.1 PG — SIGNIFICANT CHANGE UP (ref 27–34)
MCHC RBC-ENTMCNC: 34.1 G/DL — SIGNIFICANT CHANGE UP (ref 32–36)
MCV RBC AUTO: 82.2 FL — SIGNIFICANT CHANGE UP (ref 80–100)
MONOCYTES # BLD AUTO: 0.35 K/UL — SIGNIFICANT CHANGE UP (ref 0–0.9)
MONOCYTES NFR BLD AUTO: 8.1 % — SIGNIFICANT CHANGE UP (ref 2–14)
NEUTROPHILS # BLD AUTO: 1.93 K/UL — SIGNIFICANT CHANGE UP (ref 1.8–7.4)
NEUTROPHILS NFR BLD AUTO: 44.6 % — SIGNIFICANT CHANGE UP (ref 43–77)
NRBC # BLD: 0 /100 WBCS — SIGNIFICANT CHANGE UP (ref 0–0)
PLATELET # BLD AUTO: 279 K/UL — SIGNIFICANT CHANGE UP (ref 150–400)
POTASSIUM SERPL-MCNC: 4.3 MMOL/L — SIGNIFICANT CHANGE UP (ref 3.5–5.3)
POTASSIUM SERPL-SCNC: 4.3 MMOL/L — SIGNIFICANT CHANGE UP (ref 3.5–5.3)
PROT SERPL-MCNC: 6.6 G/DL — SIGNIFICANT CHANGE UP (ref 6–8.3)
RBC # BLD: 3.99 M/UL — SIGNIFICANT CHANGE UP (ref 3.8–5.2)
RBC # FLD: 16.8 % — HIGH (ref 10.3–14.5)
SODIUM SERPL-SCNC: 131 MMOL/L — LOW (ref 135–145)
WBC # BLD: 4.33 K/UL — SIGNIFICANT CHANGE UP (ref 3.8–10.5)
WBC # FLD AUTO: 4.33 K/UL — SIGNIFICANT CHANGE UP (ref 3.8–10.5)

## 2022-08-02 PROCEDURE — 99215 OFFICE O/P EST HI 40 MIN: CPT

## 2022-08-02 NOTE — PHYSICAL EXAM
[Normal] : affect appropriate [de-identified] : b/l CW without nodularity; left CW surgical incision without oozing or purulence; eschar in middle

## 2022-08-02 NOTE — ASSESSMENT
[FreeTextEntry1] : Lab work, Dr. Abernathy's 7/7/22 medical oncology note, Dr. Leon's 7/25/22 surgery note, pathology report reviewed.\par 62 year old female with history of T2N0M0 (2.2cm), Stage IIA RIGHT breast triple negative invasive ductal carcinoma, s/p adjuvant TC chemotherapy and RT. \par \par 4/25/2022 - S/P bilateral mastectomies with left axillary sentinel node biopsy on 4/25/22. Pathology revealed:\par 1) RIGHT Breast: focal ADH\par 2) LEFT Breast: Invasive poorly differentiated ductal carcinoma (1.8 cm), DCIS, 2 benign left axillary lymph nodes (one of which was a sentinel LN).\par  ER 0%; [place] 0%; HER 2 + by FISH.\par \par Reviewed patient's diagnosis/prognosis with cancer recurrence risks and treatment options.  She is currently receiving Taxol/Kanjinti, and consents to continue this regimen–potential side effects reviewed.\par Last echocardiogram 5/2022-LVEF 63%. cardiology f/u planned.\par \par H/O leukopenia-clinically secondary to chemotherapy. ANC acceptable today for treatment. Continue to monitor CBC with differential.\par \par Patient was given the opportunity to ask questions.  Her questions have been answered to her apparent satisfaction at this time.  She expressed her understanding and willingness to comply with recommended follow-up.\par \par

## 2022-08-02 NOTE — CONSULT LETTER
[Dear  ___] : Dear  [unfilled], [Consult Letter:] : I had the pleasure of evaluating your patient, [unfilled]. [Please see my note below.] : Please see my note below. [Consult Closing:] : Thank you very much for allowing me to participate in the care of this patient.  If you have any questions, please do not hesitate to contact me. [Sincerely,] : Sincerely, [FreeTextEntry3] : Linnette Ortega MD

## 2022-08-02 NOTE — HISTORY OF PRESENT ILLNESS
[Disease: _____________________] : Disease: [unfilled] [T: ___] : T[unfilled] [N: ___] : N[unfilled] [AJCC Stage: ____] : AJCC Stage: [unfilled] [de-identified] : 2011-T2N0M0 (2.2cm), Stage IIA RIGHT breast triple negative invasive ductal carcinoma with Carmel score 8 to 9. S/P right lumpectomy (Dr. Keyon Amezquita)--> adjuvant chemotherapy with taxotere and cytoxan for 4 cycles--> IMRT with 5000cGy in 25 fractions followed by boost to the cavity of 1000cGy in 5 fractions (11/9/11-12/21/11 with Dr. Veena Walls). \par \par 12/28/21-Bilateral mammo/US showed left breast 2-3:00 4cmfn 1.4cm irregular mass suspicious of malignancy, US core biopsy recommended; grouped heterogenous calcifications at right breast 10:00 anterior depth also suspicious for malignancy, bx recommended, BIRADS4. \par \par 1/31/2022-S/P left breast 3:00 4cmfn US guided biopsy which showed invasive poorly differentiated ductal carcinoma, taylor score 8/9 (3+3+2), invasive tumor at least 0.8cm, microcalcifications present, no LVI. ER negative <1%, [place] negative <1%, HER2 2+ IHC, FISH+. \par \par 2/14/2022-MRI breast-left breast 3:00 biopsy proven cancer associated with 1.4cm mass enhancement;adjacent contiguous large area of linerar nonmassenhancement seen extending from biopsy site into retroareolar/central/posterior left breast up to 9.4cm, 2 site MRI biopsy rec.; [id number].\par \par 3/1/2022-MRI biopsy of both areas showed fibrocystic changes including sclerosing adenosis \par \par 2/15/2022-CT C/A/P-showed multiple mildly enlarged mediastinal and hilar LNs, increased in size compard to7/10/2020. \par Same day bone scan negative for osseous metastasis. \par \par 3/2/2022-PET scan-showed known left breast cancer,adjacent 4.4cm hematoma, indeterminate FDG avid left supraclavicular, mediastinal and hilar LAD new and/or enlarged since 7/10/2020, mediastinal and hilar LNs symmetric distribution suggests a benign etiology;left supraclavicular LN access to US guided biopsy; subcm minimallyFDGavid left axillary LN nonspecific, may be biopsied. \par \par 3/15/2022-Left supraclavicular LN US guided FNA was negative for malignant cells. \par \par Saw Dr. Anmol Sanford pulmonary on 4/11/22. Followup after surgery to assess the mediastinal LNs-felt unlikely related to breast cancer. S/P Dr. Sanford - biopsy negative for malignancy; + granulomatous inflammation\par \par 4/25/2022 - S/P bilateral mastectomies with left axillary sentinel node biopsy on 4/25/22. Pathology revealed:\par 1) RIGHT Breast: focal ADH\par 2) LEFT Breast: Invasive poorly differentiated ductal carcinoma (1.8 cm), DCIS, 2 benign left axillary lymph nodes (one of which was a sentinel LN).\par  ER 0%; [place] 0%; HER 2 + by FISH.\par \par 6/9/2022-Taxol/Kanjinti begun.\par  [de-identified] : Invasive poorly differentiated ductal carcinoma [de-identified] : ER-/MO-/Her 2+ [de-identified] : 11/2019-My Risk genetic testing negative. BRCA 1/BRCA 2 negative.\par \par Patient under the oncology care of Dr. Abernathy through 7/2022.\par \par Sister leukemia dx'd age 40. Mother had multiple myeloma. Brother stomach cancer age 68. Daughter with CLL age 30. Paternal 2nd cousin breast cancer age 49. Paternal 2nd cousin liver cancer age 40 had hepatitis. Family history is negative for cancer of the ovary, endometrium, prostate, pancreatic and melanoma. \par The patient is not of Ashkenazi ethnic background. \par \par  [de-identified] : This is my initial office visit with patient who had been under the oncology care of Dr. Abernathy, will be leaving the [facility] practice.\par No current pulmonary/GI//neuro.complaints.\par +M-S aches.\par Taking antibiotic (3 days left) for breast incision (told is improving) per Dr. Leon.\par Sees cardiologist with [facility] scheduled 8/4/22. Will f/u with him for the next Echo as well.\par Has pulmonary f/u planned as well.\par \par Daughter Shobha present (is a nurse at Health system, not working currently), and assisted in providing history.\par \par Has had COVID vaccines (Pfizer).

## 2022-08-03 DIAGNOSIS — Z51.11 ENCOUNTER FOR ANTINEOPLASTIC CHEMOTHERAPY: ICD-10-CM

## 2022-08-03 DIAGNOSIS — R11.2 NAUSEA WITH VOMITING, UNSPECIFIED: ICD-10-CM

## 2022-08-04 ENCOUNTER — APPOINTMENT (OUTPATIENT)
Dept: CV DIAGNOSITCS | Facility: HOSPITAL | Age: 62
End: 2022-08-04

## 2022-08-04 ENCOUNTER — OUTPATIENT (OUTPATIENT)
Dept: OUTPATIENT SERVICES | Facility: HOSPITAL | Age: 62
LOS: 1 days | End: 2022-08-04

## 2022-08-04 DIAGNOSIS — Z90.710 ACQUIRED ABSENCE OF BOTH CERVIX AND UTERUS: Chronic | ICD-10-CM

## 2022-08-04 DIAGNOSIS — Z92.21 PERSONAL HISTORY OF ANTINEOPLASTIC CHEMOTHERAPY: Chronic | ICD-10-CM

## 2022-08-04 DIAGNOSIS — Z98.890 OTHER SPECIFIED POSTPROCEDURAL STATES: Chronic | ICD-10-CM

## 2022-08-04 DIAGNOSIS — R06.02 SHORTNESS OF BREATH: ICD-10-CM

## 2022-08-04 PROCEDURE — 93351 STRESS TTE COMPLETE: CPT | Mod: 26

## 2022-08-04 PROCEDURE — 93325 DOPPLER ECHO COLOR FLOW MAPG: CPT | Mod: 26,GC

## 2022-08-04 PROCEDURE — 93320 DOPPLER ECHO COMPLETE: CPT | Mod: 26,GC

## 2022-08-09 ENCOUNTER — APPOINTMENT (OUTPATIENT)
Dept: INFUSION THERAPY | Facility: HOSPITAL | Age: 62
End: 2022-08-09

## 2022-08-09 ENCOUNTER — RESULT REVIEW (OUTPATIENT)
Age: 62
End: 2022-08-09

## 2022-08-09 ENCOUNTER — APPOINTMENT (OUTPATIENT)
Dept: HEMATOLOGY ONCOLOGY | Facility: CLINIC | Age: 62
End: 2022-08-09

## 2022-08-09 LAB
ALBUMIN SERPL ELPH-MCNC: 4.3 G/DL — SIGNIFICANT CHANGE UP (ref 3.3–5)
ALP SERPL-CCNC: 90 U/L — SIGNIFICANT CHANGE UP (ref 40–120)
ALT FLD-CCNC: 24 U/L — SIGNIFICANT CHANGE UP (ref 10–45)
ANION GAP SERPL CALC-SCNC: 10 MMOL/L — SIGNIFICANT CHANGE UP (ref 5–17)
AST SERPL-CCNC: 19 U/L — SIGNIFICANT CHANGE UP (ref 10–40)
BASOPHILS # BLD AUTO: 0.04 K/UL — SIGNIFICANT CHANGE UP (ref 0–0.2)
BASOPHILS NFR BLD AUTO: 0.9 % — SIGNIFICANT CHANGE UP (ref 0–2)
BILIRUB SERPL-MCNC: <0.2 MG/DL — SIGNIFICANT CHANGE UP (ref 0.2–1.2)
BUN SERPL-MCNC: 10 MG/DL — SIGNIFICANT CHANGE UP (ref 7–23)
CALCIUM SERPL-MCNC: 8.9 MG/DL — SIGNIFICANT CHANGE UP (ref 8.4–10.5)
CHLORIDE SERPL-SCNC: 102 MMOL/L — SIGNIFICANT CHANGE UP (ref 96–108)
CO2 SERPL-SCNC: 24 MMOL/L — SIGNIFICANT CHANGE UP (ref 22–31)
CREAT SERPL-MCNC: 0.51 MG/DL — SIGNIFICANT CHANGE UP (ref 0.5–1.3)
EGFR: 105 ML/MIN/1.73M2 — SIGNIFICANT CHANGE UP
EOSINOPHIL # BLD AUTO: 0.07 K/UL — SIGNIFICANT CHANGE UP (ref 0–0.5)
EOSINOPHIL NFR BLD AUTO: 1.6 % — SIGNIFICANT CHANGE UP (ref 0–6)
GLUCOSE SERPL-MCNC: 102 MG/DL — HIGH (ref 70–99)
HCT VFR BLD CALC: 30.9 % — LOW (ref 34.5–45)
HGB BLD-MCNC: 10.5 G/DL — LOW (ref 11.5–15.5)
IMM GRANULOCYTES NFR BLD AUTO: 2.5 % — HIGH (ref 0–1.5)
LYMPHOCYTES # BLD AUTO: 1.88 K/UL — SIGNIFICANT CHANGE UP (ref 1–3.3)
LYMPHOCYTES # BLD AUTO: 43.1 % — SIGNIFICANT CHANGE UP (ref 13–44)
MCHC RBC-ENTMCNC: 28.3 PG — SIGNIFICANT CHANGE UP (ref 27–34)
MCHC RBC-ENTMCNC: 34 G/DL — SIGNIFICANT CHANGE UP (ref 32–36)
MCV RBC AUTO: 83.3 FL — SIGNIFICANT CHANGE UP (ref 80–100)
MONOCYTES # BLD AUTO: 0.33 K/UL — SIGNIFICANT CHANGE UP (ref 0–0.9)
MONOCYTES NFR BLD AUTO: 7.6 % — SIGNIFICANT CHANGE UP (ref 2–14)
NEUTROPHILS # BLD AUTO: 1.93 K/UL — SIGNIFICANT CHANGE UP (ref 1.8–7.4)
NEUTROPHILS NFR BLD AUTO: 44.3 % — SIGNIFICANT CHANGE UP (ref 43–77)
NRBC # BLD: 0 /100 WBCS — SIGNIFICANT CHANGE UP (ref 0–0)
PLATELET # BLD AUTO: 243 K/UL — SIGNIFICANT CHANGE UP (ref 150–400)
POTASSIUM SERPL-MCNC: 4.2 MMOL/L — SIGNIFICANT CHANGE UP (ref 3.5–5.3)
POTASSIUM SERPL-SCNC: 4.2 MMOL/L — SIGNIFICANT CHANGE UP (ref 3.5–5.3)
PROT SERPL-MCNC: 6.5 G/DL — SIGNIFICANT CHANGE UP (ref 6–8.3)
RBC # BLD: 3.71 M/UL — LOW (ref 3.8–5.2)
RBC # FLD: 17.2 % — HIGH (ref 10.3–14.5)
SODIUM SERPL-SCNC: 136 MMOL/L — SIGNIFICANT CHANGE UP (ref 135–145)
WBC # BLD: 4.36 K/UL — SIGNIFICANT CHANGE UP (ref 3.8–10.5)
WBC # FLD AUTO: 4.36 K/UL — SIGNIFICANT CHANGE UP (ref 3.8–10.5)

## 2022-08-11 ENCOUNTER — APPOINTMENT (OUTPATIENT)
Dept: PULMONOLOGY | Facility: CLINIC | Age: 62
End: 2022-08-11

## 2022-08-11 VITALS
DIASTOLIC BLOOD PRESSURE: 92 MMHG | HEART RATE: 64 BPM | WEIGHT: 158 LBS | TEMPERATURE: 97.2 F | OXYGEN SATURATION: 98 % | HEIGHT: 62 IN | SYSTOLIC BLOOD PRESSURE: 122 MMHG | BODY MASS INDEX: 29.08 KG/M2 | RESPIRATION RATE: 14 BRPM

## 2022-08-11 PROCEDURE — 94729 DIFFUSING CAPACITY: CPT

## 2022-08-11 PROCEDURE — 99214 OFFICE O/P EST MOD 30 MIN: CPT | Mod: 25

## 2022-08-11 PROCEDURE — 94727 GAS DIL/WSHOT DETER LNG VOL: CPT

## 2022-08-11 PROCEDURE — 94010 BREATHING CAPACITY TEST: CPT

## 2022-08-11 PROCEDURE — 95012 NITRIC OXIDE EXP GAS DETER: CPT

## 2022-08-11 NOTE — ADDENDUM
[FreeTextEntry1] : Documented by Alfredo Perez acting as a scribe for Dr. Justice Mcdonald on 08/11/2022 .\par \par All medical record entries made by the Scribe were at my, Dr. Justice Mcdonald's, direction and personally dictated by me on. I have reviewed the chart and agree that the record accurately reflects my personal performance of the history, physical exam, assessment and plan. I have also personally directed, reviewed, and agree with the discharge instructions.

## 2022-08-11 NOTE — HISTORY OF PRESENT ILLNESS
[TextBox_4] : Ms. LOYA is a 62 year old female originally [place] from with a history of non smoking, breast cancer originally on the right 2011, s/p lumpectomy with radiation and chemo, arthritis, HTN, ELVIN, newly diagnosed left sided breast tumor (intraductal carcinoma) 2022 presenting to the office today for f/p pulmonary evaluation. Her chief complaint is\par \par -she notes having 2 treatments to go for chemo \par -she notes having gene therapy thatll last for a year \par -she notes not sleeping well \par -she notes having to go thru sleep apnea process to get sleep aids \par -she notes having a pain and acid reflux \par -she notes eating ok \par -she notes gaining some weight \par -she notes feeling a little down \par -she notes not using inhalers \par -she notes not going to travel \par -she notes everything bothers her and irritates her\par -she notes being in pain, lack of sleep \par patient denies any headaches, nausea, vomiting, fever, chills, sweats, chest pain, chest pressure, palpitations, coughing, wheezing, fatigue, diarrhea, constipation, dysphagia, myalgias, dizziness, leg swelling, leg pain, itchy eyes, itchy ears, heartburn, reflux or sour taste in the mouth

## 2022-08-11 NOTE — PROCEDURE
[FreeTextEntry1] : Full PFT revealed  normal flows, with a FEV1 of 2.06L,which is 85% of predicted,  normal lung volumes, and a diffusion of 3.57, which is 94% of predicted with a normal flow volume loop \par \par FENO was    7   ; a normal value being less than 25\par Fractional exhaled nitric oxide (FENO) is regarded as a simple, noninvasive method for assessing eosinophilic airway inflammation. Produced by a variety of cells within the lung, nitric oxide (NO) concentrations are generally low in healthy individuals. However, high concentrations of NO appear to be involved in nonspecific host defense mechanisms and chronic inflammatory diseases such as asthma. The American Thoracic Society (ATS) therefore has recommended using FENO to aid in the diagnosis and monitoring of eosinophilic airway inflammation and asthma, and for identifying steroid responsive individuals whose chronic respiratory symptoms may be caused by airway inflammation.

## 2022-08-11 NOTE — ASSESSMENT
[FreeTextEntry1] : Ms. LOYA is a 62 year old female originally [place] from with a history of non smoking, breast cancer originally on the right 2011, s/p lumpectomy with radiation and chemo, arthritis, HTN, ELVNI, newly diagnosed left sided breast tumor (intraductal carcinoma) 2022 presenting to the office today for f/p pulmonary evaluation for SOB, ?asthma, allergies, GERD, untreated ELVIN, abnormal CT of the chest (adenopathy) c/w adenopathy; active reflux \par \par \par Her shortness of breath is multifactorial due to:\par -poor mechanics of breathing \par -out of shape / overweight\par -pulmonary disease\par    -mild asthma \par -?cardiac disease \par \par problem 1:mild asthma \par -add Breo Ellipta 200 at 1 inhalation QD \par -Add Symbicort 160 2 BID \par -Asthma is  believed to be caused by inherited (genetic) and environmental factor, but its exact cause is unknown. Asthma may be triggered by allergens, lung infections, or irritants in the air. Asthma triggers are different for each person \par -Inhaler technique reviewed as well as oral hygiene techniques reviewed with patient. Avoidance of cold air, extremes of temperature, rescue inhaler should be used before exercise. Order of medication reviewed with patient. Recommended use of a cool mist humidifier in the bedroom. \par \par problem 2:allergies/sinus \par -get Blood work to include: asthma panel, food IgE panel, IgE level, eosinophil level, vitamin D level \par -Environmental measures for allergies were encouraged including mattress and pillow covers, air purifier, and environmental controls. \par \par problem 3:GERD (active) \par -Add Pepcid 40 mg QHS \par -Reglan 50 pre meal qHS \par -continue Omeprazole 40 mg QAM, pre-meal \par -Rule of 2s: avoid eating too much, eating too late, eating too spicy, eating two hours before bed.\par -Things to avoid including overeating, spicy foods, tight clothing, eating within three hours of bed, this list is not all inclusive. \par -For treatment of reflux, possible options discussed including diet control, H2 blockers, PPIs, as well as coating motility agents discussed as treatment options. Timing of meals and proximity of last meal to sleep were discussed. If symptoms persist, a formal gastrointestinal evaluation is needed. \par \par Problem 4:ELVIN (elevated Mallampati class) \par -recommended DD because she is intolerant of CPAP (Jeremi Paulino) \par -Repeat Home sleep study \par Sleep apnea is associated with adverse clinical consequences which can affect most organ systems.  Cardiovascular disease risk includes arrhythmias, atrial fibrillation, hypertension, coronary artery disease, and stroke. Metabolic disorders include diabetes type 2, non-alcoholic fatty liver disease. Mood disorder especially depression; and cognitive decline especially in the elderly. Associations with  chronic reflux/Louis’s esophagus some but not all inclusive. \par -Reasons  include arousal consistent with hypopnea; respiratory events most prominent in REM sleep or supine position; therefore sleep staging and body position are important for accurate diagnosis and estimation of AHI. \par \par Problem 5: Abnormal CT (adenopathy that dates back to 2020) (?sarcoidosis less likely lymphoma and even less likely breast cancer)\par -s/p ACE \par -s/p ESR \par -complete bronchoscopy with EBUS with Dr Herzog (will not interfere with surgery)  c/w sarcoidosis\par -Consult optometry \par CAT scans are the only radiological modality to identify abnormalities w/in the lungs with regards to nodules/masses/lymph nodes. Risks, benefits were reviewed in detail. The guidelines for abnormalities include follow up CT scans at various intervals which could range from 6 weeks to 1 year intervals. If there is a change for the worse then consideration for a biopsy will be considered if you are a candidate. Second opinion evaluation with a thoracic surgeon or an interventional radiologist could be offered. \par \par Problem: Sarcoidosis \par -Sarcoidosis is a medical mystery and can present with very serious illness or little consequence. The disease can affect any organ including skin, liver, lymph glands, spleen, eyes, nervous system, musculoskeletal system, heart, brain, kidneys, and including the lungs. Pulmonary sarcoidosis can cause loss of lung volume and abnormal lung stiffness. This disease is characterized by presence of granulomas, small areas of inflamed cells. Sarcoidosis patients should have regular cardiac and eye examinations as well as regular PFTs. \par \par problem 6: cardiac disease\par -recommended to continue to follow up with Cardiologist \par \par problem 7: poor breathing mechanics\par -Proper breathing techniques were reviewed with an emphasis of exhalation. Patient instructed to breath in for 1 second and out for four seconds. Patient was encouraged to not talk while walking. \par \par problem 8: out of shape / overweight\par -Recommended Devyn Walter book on 10 day Detox \par -Weight loss, exercise, and diet control were discussed and are highly encouraged. Treatment options were given such as, aqua therapy, and contacting a nutritionist. Recommended to use the elliptical, stationary bike, less use of treadmill. Mindful eating was explained to the patient Obesity is associated with worsening asthma, shortness of breath, and potential for cardiac disease, diabetes, and other underlying medical conditions\par \par problem 9: health maintenance \par -recommended yearly flu shot after October 15\par -recommended strep pneumonia vaccines: Prevnar-13 vaccine, followed by Pneumo vaccine 23 one year following after 65\par -recommended early intervention for Upper Respiratory Infections (URIs)\par -recommended regular osteoporosis evaluations\par -recommended early dermatological evaluations\par -recommended after the age of 50 to the age of 70, colonoscopy every 5 years\par \par F/U in 6-8 weeks.\par She is encouraged to call with any changes, concerns, or questions\par

## 2022-08-11 NOTE — REASON FOR VISIT
[Follow-Up] : a follow-up visit [TextBox_44] : SOB, ?asthma, allergies, GERD, untreated ELVIN, abnormal CT of the chest (adenopathy)

## 2022-08-16 ENCOUNTER — APPOINTMENT (OUTPATIENT)
Dept: HEMATOLOGY ONCOLOGY | Facility: CLINIC | Age: 62
End: 2022-08-16

## 2022-08-16 ENCOUNTER — RESULT REVIEW (OUTPATIENT)
Age: 62
End: 2022-08-16

## 2022-08-16 ENCOUNTER — APPOINTMENT (OUTPATIENT)
Dept: SURGICAL ONCOLOGY | Facility: CLINIC | Age: 62
End: 2022-08-16

## 2022-08-16 ENCOUNTER — APPOINTMENT (OUTPATIENT)
Dept: INFUSION THERAPY | Facility: HOSPITAL | Age: 62
End: 2022-08-16

## 2022-08-16 VITALS
RESPIRATION RATE: 16 BRPM | SYSTOLIC BLOOD PRESSURE: 136 MMHG | OXYGEN SATURATION: 99 % | HEART RATE: 62 BPM | DIASTOLIC BLOOD PRESSURE: 80 MMHG | TEMPERATURE: 97 F

## 2022-08-16 LAB
ALBUMIN SERPL ELPH-MCNC: 4.5 G/DL — SIGNIFICANT CHANGE UP (ref 3.3–5)
ALP SERPL-CCNC: 73 U/L — SIGNIFICANT CHANGE UP (ref 40–120)
ALT FLD-CCNC: 22 U/L — SIGNIFICANT CHANGE UP (ref 10–45)
ANION GAP SERPL CALC-SCNC: 11 MMOL/L — SIGNIFICANT CHANGE UP (ref 5–17)
AST SERPL-CCNC: 18 U/L — SIGNIFICANT CHANGE UP (ref 10–40)
BASOPHILS # BLD AUTO: 0.04 K/UL — SIGNIFICANT CHANGE UP (ref 0–0.2)
BASOPHILS NFR BLD AUTO: 1.2 % — SIGNIFICANT CHANGE UP (ref 0–2)
BILIRUB SERPL-MCNC: 0.2 MG/DL — SIGNIFICANT CHANGE UP (ref 0.2–1.2)
BUN SERPL-MCNC: 11 MG/DL — SIGNIFICANT CHANGE UP (ref 7–23)
CALCIUM SERPL-MCNC: 9.2 MG/DL — SIGNIFICANT CHANGE UP (ref 8.4–10.5)
CHLORIDE SERPL-SCNC: 97 MMOL/L — SIGNIFICANT CHANGE UP (ref 96–108)
CO2 SERPL-SCNC: 25 MMOL/L — SIGNIFICANT CHANGE UP (ref 22–31)
CREAT SERPL-MCNC: 0.58 MG/DL — SIGNIFICANT CHANGE UP (ref 0.5–1.3)
EGFR: 102 ML/MIN/1.73M2 — SIGNIFICANT CHANGE UP
EOSINOPHIL # BLD AUTO: 0.07 K/UL — SIGNIFICANT CHANGE UP (ref 0–0.5)
EOSINOPHIL NFR BLD AUTO: 2.1 % — SIGNIFICANT CHANGE UP (ref 0–6)
GLUCOSE SERPL-MCNC: 127 MG/DL — HIGH (ref 70–99)
HCT VFR BLD CALC: 31.3 % — LOW (ref 34.5–45)
HGB BLD-MCNC: 10.7 G/DL — LOW (ref 11.5–15.5)
IMM GRANULOCYTES NFR BLD AUTO: 1.5 % — SIGNIFICANT CHANGE UP (ref 0–1.5)
LYMPHOCYTES # BLD AUTO: 1.64 K/UL — SIGNIFICANT CHANGE UP (ref 1–3.3)
LYMPHOCYTES # BLD AUTO: 48.8 % — HIGH (ref 13–44)
MCHC RBC-ENTMCNC: 28.2 PG — SIGNIFICANT CHANGE UP (ref 27–34)
MCHC RBC-ENTMCNC: 34.2 G/DL — SIGNIFICANT CHANGE UP (ref 32–36)
MCV RBC AUTO: 82.6 FL — SIGNIFICANT CHANGE UP (ref 80–100)
MONOCYTES # BLD AUTO: 0.35 K/UL — SIGNIFICANT CHANGE UP (ref 0–0.9)
MONOCYTES NFR BLD AUTO: 10.4 % — SIGNIFICANT CHANGE UP (ref 2–14)
NEUTROPHILS # BLD AUTO: 1.21 K/UL — LOW (ref 1.8–7.4)
NEUTROPHILS NFR BLD AUTO: 36 % — LOW (ref 43–77)
NRBC # BLD: 0 /100 WBCS — SIGNIFICANT CHANGE UP (ref 0–0)
PLATELET # BLD AUTO: 266 K/UL — SIGNIFICANT CHANGE UP (ref 150–400)
POTASSIUM SERPL-MCNC: 3.9 MMOL/L — SIGNIFICANT CHANGE UP (ref 3.5–5.3)
POTASSIUM SERPL-SCNC: 3.9 MMOL/L — SIGNIFICANT CHANGE UP (ref 3.5–5.3)
PROT SERPL-MCNC: 6.7 G/DL — SIGNIFICANT CHANGE UP (ref 6–8.3)
RBC # BLD: 3.79 M/UL — LOW (ref 3.8–5.2)
RBC # FLD: 17.4 % — HIGH (ref 10.3–14.5)
SODIUM SERPL-SCNC: 133 MMOL/L — LOW (ref 135–145)
WBC # BLD: 3.36 K/UL — LOW (ref 3.8–10.5)
WBC # FLD AUTO: 3.36 K/UL — LOW (ref 3.8–10.5)

## 2022-08-16 PROCEDURE — 99214 OFFICE O/P EST MOD 30 MIN: CPT

## 2022-08-16 NOTE — ASSESSMENT
[FreeTextEntry1] : Lab work, cardiac echo report reviewed.\par 2011-T2N0M0 (2.2cm), Stage IIA RIGHT breast triple negative invasive ductal carcinoma, s/p adjuvant TC chemotherapy and RT. \par \par 4/25/2022 - S/P bilateral mastectomies with left axillary sentinel node biopsy on 4/25/22. Pathology revealed:\par 1) RIGHT Breast: focal ADH\par 2) LEFT Breast: Invasive poorly differentiated ductal carcinoma (1.8 cm), DCIS, 2 benign left axillary lymph nodes (one of which was a sentinel LN).\par  ER 0%; OR 0%; HER 2 + by FISH.\par \par Patient is currently receiving Taxol/Kanjinti-consents to continue.\par Last echocardiogram 8/2022-EF 66%. Cardiology following.\par \par H/O leukopenia-clinically secondary to chemotherapy. Continue to monitor CBC with differential. GF support as needed.\par \par Patient was given the opportunity to ask questions.  Her questions have been answered to her apparent satisfaction at this time.  She expressed her understanding and willingness to comply with recommended follow-up.\par \par

## 2022-08-16 NOTE — PHYSICAL EXAM
[Normal] : affect appropriate [de-identified] : b/l CW without nodularity; left CW surgical incision without oozing or purulence

## 2022-08-16 NOTE — HISTORY OF PRESENT ILLNESS
[Disease: _____________________] : Disease: [unfilled] [T: ___] : T[unfilled] [N: ___] : N[unfilled] [AJCC Stage: ____] : AJCC Stage: [unfilled] [de-identified] : 2011-T2N0M0 (2.2cm), Stage IIA RIGHT breast triple negative invasive ductal carcinoma with [place] score 8 to 9. S/P right lumpectomy (Dr. Keyon Amezquita)--> adjuvant chemotherapy with taxotere and cytoxan for 4 cycles--> IMRT with 5000cGy in 25 fractions followed by boost to the cavity of 1000cGy in 5 fractions (11/9/11-12/21/11 with Dr. Veena Walls). \par \par 12/28/21-Bilateral mammo/US showed left breast 2-3:00 4cmfn 1.4cm irregular mass suspicious of malignancy, US core biopsy recommended; grouped heterogenous calcifications at right breast 10:00 anterior depth also suspicious for malignancy, bx recommended, BIRADS4. \par \par 1/31/2022-S/P left breast 3:00 4cmfn US guided biopsy which showed invasive poorly differentiated ductal carcinoma, taylor score 8/9 (3+3+2), invasive tumor at least 0.8cm, microcalcifications present, no LVI. ER negative <1%, WA negative <1%, HER2 2+ IHC, FISH+. \par \par 2/14/2022-MRI breast-left breast 3:00 biopsy proven cancer associated with 1.4cm mass enhancement;adjacent contiguous large area of linerar nonmassenhancement seen extending from biopsy site into retroareolar/central/posterior left breast up to 9.4cm, 2 site MRI biopsy rec.; [id number].\par \par 3/1/2022-MRI biopsy of both areas showed fibrocystic changes including sclerosing adenosis \par \par 2/15/2022-CT C/A/P-showed multiple mildly enlarged mediastinal and hilar LNs, increased in size compard to7/10/2020. \par Same day bone scan negative for osseous metastasis. \par \par 3/2/2022-PET scan-showed known left breast cancer,adjacent 4.4cm hematoma, indeterminate FDG avid left supraclavicular, mediastinal and hilar LAD new and/or enlarged since 7/10/2020, mediastinal and hilar LNs symmetric distribution suggests a benign etiology;left supraclavicular LN access to US guided biopsy; subcm minimallyFDGavid left axillary LN nonspecific, may be biopsied. \par \par 3/15/2022-Left supraclavicular LN US guided FNA was negative for malignant cells. \par \par Saw Dr. Anmol Sanford pulmonary on 4/11/22. Followup after surgery to assess the mediastinal LNs-felt unlikely related to breast cancer. S/P Dr. Sanford - biopsy negative for malignancy; + granulomatous inflammation\par \par 4/25/2022 - S/P bilateral mastectomies with left axillary sentinel node biopsy on 4/25/22. Pathology revealed:\par 1) RIGHT Breast: focal ADH\par 2) LEFT Breast: Invasive poorly differentiated ductal carcinoma (1.8 cm), DCIS, 2 benign left axillary lymph nodes (one of which was a sentinel LN).\par  ER 0%; WA 0%; HER 2 + by FISH.\par \par 6/9/2022-Taxol/Kanjinti begun.\par  [de-identified] : Invasive poorly differentiated ductal carcinoma minimal [de-identified] : ER-/NV-/Her 2+ [de-identified] : 11/2019-My Risk genetic testing negative. BRCA 1/BRCA 2 negative.\par \par Patient under the oncology care of Dr. Abernathy through 7/2022.\par \par Sister leukemia dx'd age 40. Mother had multiple myeloma. Brother stomach cancer age 68. Daughter with CLL age 30. Paternal 2nd cousin breast cancer age 49. Paternal 2nd cousin liver cancer age 40 had hepatitis. Family history is negative for cancer of the ovary, endometrium, prostate, pancreatic and melanoma. \par The patient is not of Ashkenazi ethnic background. \par \par  [de-identified] : No current pulmonary/GI//neuro.complaints.\par Sees cardiologist for echo monitoring-last 8/4/22.\feng Has pulmonary f/u planned as well.\par \par Daughter Shobha present (is a nurse at [employer], not working currently), and assisted in providing history.\par \feng Has had COVID vaccines (Pfizer).

## 2022-08-17 ENCOUNTER — NON-APPOINTMENT (OUTPATIENT)
Age: 62
End: 2022-08-17

## 2022-08-23 ENCOUNTER — APPOINTMENT (OUTPATIENT)
Dept: HEMATOLOGY ONCOLOGY | Facility: CLINIC | Age: 62
End: 2022-08-23

## 2022-08-23 ENCOUNTER — APPOINTMENT (OUTPATIENT)
Dept: INFUSION THERAPY | Facility: HOSPITAL | Age: 62
End: 2022-08-23

## 2022-08-23 ENCOUNTER — NON-APPOINTMENT (OUTPATIENT)
Age: 62
End: 2022-08-23

## 2022-08-23 ENCOUNTER — OUTPATIENT (OUTPATIENT)
Dept: OUTPATIENT SERVICES | Facility: HOSPITAL | Age: 62
LOS: 1 days | End: 2022-08-23
Payer: MEDICAID

## 2022-08-23 ENCOUNTER — RESULT REVIEW (OUTPATIENT)
Age: 62
End: 2022-08-23

## 2022-08-23 ENCOUNTER — APPOINTMENT (OUTPATIENT)
Dept: ULTRASOUND IMAGING | Facility: IMAGING CENTER | Age: 62
End: 2022-08-23

## 2022-08-23 DIAGNOSIS — C50.919 MALIGNANT NEOPLASM OF UNSPECIFIED SITE OF UNSPECIFIED FEMALE BREAST: ICD-10-CM

## 2022-08-23 DIAGNOSIS — Z90.710 ACQUIRED ABSENCE OF BOTH CERVIX AND UTERUS: Chronic | ICD-10-CM

## 2022-08-23 DIAGNOSIS — Z98.890 OTHER SPECIFIED POSTPROCEDURAL STATES: Chronic | ICD-10-CM

## 2022-08-23 DIAGNOSIS — Z92.21 PERSONAL HISTORY OF ANTINEOPLASTIC CHEMOTHERAPY: Chronic | ICD-10-CM

## 2022-08-23 LAB
ALBUMIN SERPL ELPH-MCNC: 4.1 G/DL — SIGNIFICANT CHANGE UP (ref 3.3–5)
ALP SERPL-CCNC: 79 U/L — SIGNIFICANT CHANGE UP (ref 40–120)
ALT FLD-CCNC: 25 U/L — SIGNIFICANT CHANGE UP (ref 10–45)
ANION GAP SERPL CALC-SCNC: 10 MMOL/L — SIGNIFICANT CHANGE UP (ref 5–17)
AST SERPL-CCNC: 19 U/L — SIGNIFICANT CHANGE UP (ref 10–40)
BASOPHILS # BLD AUTO: 0.04 K/UL — SIGNIFICANT CHANGE UP (ref 0–0.2)
BASOPHILS NFR BLD AUTO: 1 % — SIGNIFICANT CHANGE UP (ref 0–2)
BILIRUB SERPL-MCNC: <0.2 MG/DL — SIGNIFICANT CHANGE UP (ref 0.2–1.2)
BUN SERPL-MCNC: 11 MG/DL — SIGNIFICANT CHANGE UP (ref 7–23)
CALCIUM SERPL-MCNC: 8.9 MG/DL — SIGNIFICANT CHANGE UP (ref 8.4–10.5)
CHLORIDE SERPL-SCNC: 103 MMOL/L — SIGNIFICANT CHANGE UP (ref 96–108)
CO2 SERPL-SCNC: 24 MMOL/L — SIGNIFICANT CHANGE UP (ref 22–31)
CREAT SERPL-MCNC: 0.51 MG/DL — SIGNIFICANT CHANGE UP (ref 0.5–1.3)
EGFR: 105 ML/MIN/1.73M2 — SIGNIFICANT CHANGE UP
EOSINOPHIL # BLD AUTO: 0.08 K/UL — SIGNIFICANT CHANGE UP (ref 0–0.5)
EOSINOPHIL NFR BLD AUTO: 2 % — SIGNIFICANT CHANGE UP (ref 0–6)
GLUCOSE SERPL-MCNC: 151 MG/DL — HIGH (ref 70–99)
HCT VFR BLD CALC: 33.5 % — LOW (ref 34.5–45)
HGB BLD-MCNC: 11.3 G/DL — LOW (ref 11.5–15.5)
IMM GRANULOCYTES NFR BLD AUTO: 1.5 % — SIGNIFICANT CHANGE UP (ref 0–1.5)
INR BLD: 1.15 RATIO — SIGNIFICANT CHANGE UP (ref 0.88–1.16)
LYMPHOCYTES # BLD AUTO: 1.95 K/UL — SIGNIFICANT CHANGE UP (ref 1–3.3)
LYMPHOCYTES # BLD AUTO: 49.2 % — HIGH (ref 13–44)
MCHC RBC-ENTMCNC: 27.9 PG — SIGNIFICANT CHANGE UP (ref 27–34)
MCHC RBC-ENTMCNC: 33.7 G/DL — SIGNIFICANT CHANGE UP (ref 32–36)
MCV RBC AUTO: 82.7 FL — SIGNIFICANT CHANGE UP (ref 80–100)
MONOCYTES # BLD AUTO: 0.37 K/UL — SIGNIFICANT CHANGE UP (ref 0–0.9)
MONOCYTES NFR BLD AUTO: 9.3 % — SIGNIFICANT CHANGE UP (ref 2–14)
NEUTROPHILS # BLD AUTO: 1.46 K/UL — LOW (ref 1.8–7.4)
NEUTROPHILS NFR BLD AUTO: 37 % — LOW (ref 43–77)
NRBC # BLD: 0 /100 WBCS — SIGNIFICANT CHANGE UP (ref 0–0)
PLATELET # BLD AUTO: 284 K/UL — SIGNIFICANT CHANGE UP (ref 150–400)
POTASSIUM SERPL-MCNC: 4.1 MMOL/L — SIGNIFICANT CHANGE UP (ref 3.5–5.3)
POTASSIUM SERPL-SCNC: 4.1 MMOL/L — SIGNIFICANT CHANGE UP (ref 3.5–5.3)
PROT SERPL-MCNC: 6 G/DL — SIGNIFICANT CHANGE UP (ref 6–8.3)
PROTHROM AB SERPL-ACNC: 13.4 SEC — SIGNIFICANT CHANGE UP (ref 10.5–13.4)
RBC # BLD: 4.05 M/UL — SIGNIFICANT CHANGE UP (ref 3.8–5.2)
RBC # FLD: 18.2 % — HIGH (ref 10.3–14.5)
SODIUM SERPL-SCNC: 137 MMOL/L — SIGNIFICANT CHANGE UP (ref 135–145)
WBC # BLD: 3.96 K/UL — SIGNIFICANT CHANGE UP (ref 3.8–10.5)
WBC # FLD AUTO: 3.96 K/UL — SIGNIFICANT CHANGE UP (ref 3.8–10.5)

## 2022-08-23 PROCEDURE — 99215 OFFICE O/P EST HI 40 MIN: CPT

## 2022-08-23 PROCEDURE — 93971 EXTREMITY STUDY: CPT | Mod: 26,RT

## 2022-08-23 PROCEDURE — 93971 EXTREMITY STUDY: CPT

## 2022-08-24 ENCOUNTER — NON-APPOINTMENT (OUTPATIENT)
Age: 62
End: 2022-08-24

## 2022-08-25 ENCOUNTER — RX RENEWAL (OUTPATIENT)
Age: 62
End: 2022-08-25

## 2022-08-26 ENCOUNTER — OUTPATIENT (OUTPATIENT)
Dept: OUTPATIENT SERVICES | Facility: HOSPITAL | Age: 62
LOS: 1 days | End: 2022-08-26
Payer: MEDICAID

## 2022-08-26 ENCOUNTER — RESULT REVIEW (OUTPATIENT)
Age: 62
End: 2022-08-26

## 2022-08-26 ENCOUNTER — TRANSCRIPTION ENCOUNTER (OUTPATIENT)
Age: 62
End: 2022-08-26

## 2022-08-26 VITALS
OXYGEN SATURATION: 97 % | DIASTOLIC BLOOD PRESSURE: 64 MMHG | HEIGHT: 62 IN | HEART RATE: 65 BPM | SYSTOLIC BLOOD PRESSURE: 121 MMHG | TEMPERATURE: 98 F | RESPIRATION RATE: 17 BRPM | WEIGHT: 152.12 LBS

## 2022-08-26 DIAGNOSIS — Z92.21 PERSONAL HISTORY OF ANTINEOPLASTIC CHEMOTHERAPY: Chronic | ICD-10-CM

## 2022-08-26 DIAGNOSIS — Z98.890 OTHER SPECIFIED POSTPROCEDURAL STATES: Chronic | ICD-10-CM

## 2022-08-26 DIAGNOSIS — C50.919 MALIGNANT NEOPLASM OF UNSPECIFIED SITE OF UNSPECIFIED FEMALE BREAST: ICD-10-CM

## 2022-08-26 DIAGNOSIS — Z90.710 ACQUIRED ABSENCE OF BOTH CERVIX AND UTERUS: Chronic | ICD-10-CM

## 2022-08-26 DIAGNOSIS — T82.598A OTHER MECHANICAL COMPLICATION OF OTHER CARDIAC AND VASCULAR DEVICES AND IMPLANTS, INITIAL ENCOUNTER: ICD-10-CM

## 2022-08-26 PROCEDURE — 36598 INJ W/FLUOR EVAL CV DEVICE: CPT | Mod: 52

## 2022-08-26 PROCEDURE — 36598 INJ W/FLUOR EVAL CV DEVICE: CPT

## 2022-08-26 NOTE — PRE PROCEDURE NOTE - PRE PROCEDURE EVALUATION
Interventional Radiology    HPI: 62y Female with a chest port c/o pain with port use. Patient is here today for chest port evaluation.    Allergies:   Medications (Abx/Cardiac/Anticoagulation/Blood Products)      Data:  157.5  69  T(C): 36.8  HR: 65  BP: 121/64  RR: 17  SpO2: 97%    Exam  General: No acute distress  Chest: Non labored breathing  Abdomen: Non-distended  Extremities: No swelling, warm    -WBC 3.96 / HgB 11.3 / Hct 33.5 / Plt 284  -Na 137 / Cl 103 / BUN 11 / Glucose 151  -K 4.1 / CO2 24 / Cr 0.51  -ALT 25 / Alk Phos 79 / T.Bili <0.2  -INR1.15    Plan: 62y Female presents for Chest port evaluation  -Risks/Benefits/alternatives explained with the patient and/or healthcare proxy and witnessed informed consent obtained.

## 2022-08-26 NOTE — ASU DISCHARGE PLAN (ADULT/PEDIATRIC) - NS MD DC FALL RISK RISK
For information on Fall & Injury Prevention, visit: [email or web address] OR  [email or web address] OR  https://www.cdc.gov/steadi/patient.html

## 2022-08-26 NOTE — ASU DISCHARGE PLAN (ADULT/PEDIATRIC) - ASU DC SPECIAL INSTRUCTIONSFT
Chest Port Placement    Discharge Instructions  - You have had a chest port evaluated  - The port is ready for use.  - You may resume your normal diet.  - You may resume your normal medications however you should wait 48 hours before restarting aspirin, Plavix, or blood thinners.  - It is normal to experience some pain over the site for the next few days. You may take apply ice to the area (20 minutes on, 20 minutes off) and take Tylenol for that pain. Do not take more frequently than every 6 hours and do not exceed more than 3000mg of Tylenol in a 24 hour period.    Notify your primary physician and/or Interventional Radiology IMMEDIATELY if you experience any of the following       - Fever of 100.4F or 38C       - Chills or Rigors/ Shakes       - Swelling and/or Redness in the area around the port       - Worsening Pain       - Blood soaked bandages or worsening bleeding       - Lightheadedness and/or dizziness upon standing       - Chest Pain/ Tightness       - Shortness of Breath       - Difficulty walking    If you have a problem that you believe requires IMMEDIATE attention, please go to your NEAREST Emergency Room. If you believe your problem can safely wait until you speak to a physician, please call Interventional Radiology for any concerns.    During Normal Weekday Business Hours- You can contact the Interventional Radiology department during normal business hours via telephone.  During Evenings and Weekends- If you need to contact Interventional Radiology during off hours, do so by calling the hospital and requesting to be connected to the Interventional Radiologist on call.

## 2022-08-26 NOTE — ASU DISCHARGE PLAN (ADULT/PEDIATRIC) - NURSING INSTRUCTIONS
Please feel free to contact us at [phone] if any problems arise. After 6PM, Monday through Friday, on weekends and on holidays, please call [phone] and ask for the radiology resident on call to be paged.

## 2022-08-26 NOTE — PROCEDURE NOTE - PROCEDURE FINDINGS AND DETAILS
Right chest wall port evaluation performed. Contrast injection shows port chamber filling, no leaking of contrast or filling defects noted. Chest port catheter is in good position. No pain reported by patient with use of port during procedure. Full report to follow.

## 2022-08-31 ENCOUNTER — RESULT REVIEW (OUTPATIENT)
Age: 62
End: 2022-08-31

## 2022-08-31 ENCOUNTER — APPOINTMENT (OUTPATIENT)
Dept: HEMATOLOGY ONCOLOGY | Facility: CLINIC | Age: 62
End: 2022-08-31

## 2022-08-31 ENCOUNTER — APPOINTMENT (OUTPATIENT)
Dept: INFUSION THERAPY | Facility: HOSPITAL | Age: 62
End: 2022-08-31

## 2022-08-31 ENCOUNTER — NON-APPOINTMENT (OUTPATIENT)
Age: 62
End: 2022-08-31

## 2022-08-31 VITALS
SYSTOLIC BLOOD PRESSURE: 127 MMHG | OXYGEN SATURATION: 97 % | BODY MASS INDEX: 28.43 KG/M2 | TEMPERATURE: 96.8 F | HEART RATE: 72 BPM | RESPIRATION RATE: 16 BRPM | DIASTOLIC BLOOD PRESSURE: 78 MMHG | WEIGHT: 155.42 LBS

## 2022-08-31 LAB
BASOPHILS # BLD AUTO: 0.05 K/UL — SIGNIFICANT CHANGE UP (ref 0–0.2)
BASOPHILS NFR BLD AUTO: 1.1 % — SIGNIFICANT CHANGE UP (ref 0–2)
EOSINOPHIL # BLD AUTO: 0.1 K/UL — SIGNIFICANT CHANGE UP (ref 0–0.5)
EOSINOPHIL NFR BLD AUTO: 2.3 % — SIGNIFICANT CHANGE UP (ref 0–6)
HCT VFR BLD CALC: 30.6 % — LOW (ref 34.5–45)
HGB BLD-MCNC: 10.4 G/DL — LOW (ref 11.5–15.5)
IMM GRANULOCYTES NFR BLD AUTO: 0.7 % — SIGNIFICANT CHANGE UP (ref 0–1.5)
LYMPHOCYTES # BLD AUTO: 1.64 K/UL — SIGNIFICANT CHANGE UP (ref 1–3.3)
LYMPHOCYTES # BLD AUTO: 37.4 % — SIGNIFICANT CHANGE UP (ref 13–44)
MCHC RBC-ENTMCNC: 28.5 PG — SIGNIFICANT CHANGE UP (ref 27–34)
MCHC RBC-ENTMCNC: 34 G/DL — SIGNIFICANT CHANGE UP (ref 32–36)
MCV RBC AUTO: 83.8 FL — SIGNIFICANT CHANGE UP (ref 80–100)
MONOCYTES # BLD AUTO: 0.52 K/UL — SIGNIFICANT CHANGE UP (ref 0–0.9)
MONOCYTES NFR BLD AUTO: 11.8 % — SIGNIFICANT CHANGE UP (ref 2–14)
NEUTROPHILS # BLD AUTO: 2.05 K/UL — SIGNIFICANT CHANGE UP (ref 1.8–7.4)
NEUTROPHILS NFR BLD AUTO: 46.7 % — SIGNIFICANT CHANGE UP (ref 43–77)
NRBC # BLD: 0 /100 WBCS — SIGNIFICANT CHANGE UP (ref 0–0)
PLATELET # BLD AUTO: 239 K/UL — SIGNIFICANT CHANGE UP (ref 150–400)
RBC # BLD: 3.65 M/UL — LOW (ref 3.8–5.2)
RBC # FLD: 17.9 % — HIGH (ref 10.3–14.5)
WBC # BLD: 4.39 K/UL — SIGNIFICANT CHANGE UP (ref 3.8–10.5)
WBC # FLD AUTO: 4.39 K/UL — SIGNIFICANT CHANGE UP (ref 3.8–10.5)

## 2022-08-31 PROCEDURE — 99214 OFFICE O/P EST MOD 30 MIN: CPT

## 2022-08-31 NOTE — PHYSICAL EXAM
[Normal] : affect appropriate [de-identified] : non-toxic appearing [de-identified] : right CW port site-no erythema, swelling or tenderness [de-identified] : b/l CW without nodularity; left CW surgical incision without oozing or purulence

## 2022-08-31 NOTE — HISTORY OF PRESENT ILLNESS
[Disease: _____________________] : Disease: [unfilled] [T: ___] : T[unfilled] [N: ___] : N[unfilled] [AJCC Stage: ____] : AJCC Stage: [unfilled] [de-identified] : 2011-T2N0M0 (2.2cm), Stage IIA RIGHT breast triple negative invasive ductal carcinoma with [place] score 8 to 9. S/P right lumpectomy (Dr. Keyon Amezquita)--> adjuvant chemotherapy with taxotere and cytoxan for 4 cycles--> IMRT with 5000cGy in 25 fractions followed by boost to the cavity of 1000cGy in 5 fractions (11/9/11-12/21/11 with Dr. Veena Walls). \par \par 12/28/21-Bilateral mammo/US showed left breast 2-3:00 4cmfn 1.4cm irregular mass suspicious of malignancy, US core biopsy recommended; grouped heterogenous calcifications at right breast 10:00 anterior depth also suspicious for malignancy, bx recommended, BIRADS4. \par \par 1/31/2022-S/P left breast 3:00 4cmfn US guided biopsy which showed invasive poorly differentiated ductal carcinoma, taylor score 8/9 (3+3+2), invasive tumor at least 0.8cm, microcalcifications present, no LVI. ER negative <1%, ID negative <1%, HER2 2+ IHC, FISH+. \par \par 2/14/2022-MRI breast-left breast 3:00 biopsy proven cancer associated with 1.4cm mass enhancement;adjacent contiguous large area of linerar nonmassenhancement seen extending from biopsy site into retroareolar/central/posterior left breast up to 9.4cm, 2 site MRI biopsy rec.; [id number].\par \par 3/1/2022-MRI biopsy of both areas showed fibrocystic changes including sclerosing adenosis \par \par 2/15/2022-CT C/A/P-showed multiple mildly enlarged mediastinal and hilar LNs, increased in size compard to7/10/2020. \par Same day bone scan negative for osseous metastasis. \par \par 3/2/2022-PET scan-showed known left breast cancer,adjacent 4.4cm hematoma, indeterminate FDG avid left supraclavicular, mediastinal and hilar LAD new and/or enlarged since 7/10/2020, mediastinal and hilar LNs symmetric distribution suggests a benign etiology;left supraclavicular LN access to US guided biopsy; subcm minimallyFDGavid left axillary LN nonspecific, may be biopsied. \par \par 3/15/2022-Left supraclavicular LN US guided FNA was negative for malignant cells. \par \par Saw Dr. Anmol Sanford pulmonary on 4/11/22. Followup after surgery to assess the mediastinal LNs-felt unlikely related to breast cancer. S/P Dr. Sanford - biopsy negative for malignancy; + granulomatous inflammation\par \par 4/25/2022 - S/P bilateral mastectomies with left axillary sentinel node biopsy on 4/25/22. Pathology revealed:\par 1) RIGHT Breast: focal ADH\par 2) LEFT Breast: Invasive poorly differentiated ductal carcinoma (1.8 cm), DCIS, 2 benign left axillary lymph nodes (one of which was a sentinel LN).\par  ER 0%; ID 0%; HER 2 + by FISH.\par \par 6/9/2022-Taxol/Kanjinti begun.\par  [de-identified] : Invasive poorly differentiated ductal carcinoma [de-identified] : ER-/[place]-/Her 2+ [de-identified] : 11/2019-My Risk genetic testing negative. BRCA 1/BRCA 2 negative.\par \par Patient under the oncology care of Dr. Abernathy through 7/2022.\par \par Sister leukemia dx'd age 40. Mother had multiple myeloma. Brother stomach cancer age 68. Daughter with CLL age 30. Paternal 2nd cousin breast cancer age 49. Paternal 2nd cousin liver cancer age 40 had hepatitis. Family history is negative for cancer of the ovary, endometrium, prostate, pancreatic and melanoma. \par The patient is not of Ashkenazi ethnic background. \par \par  [de-identified] : ?Port site infection last week-treated with antibiotics-last dose yesterday. No further discomfort associated. No fevers.\par States IR checked out port last week-told good.\par No current pulmonary/GI//neuro.complaints.\par Sees cardiologist for echo monitoring-last 8/4/22.\par Has pulmonary f/u planned as well.\par \par Daughter Shobha present (is a nurse at [employer], not working currently), and assisted in providing history.\par \feng Has had COVID vaccines (Pfizer).

## 2022-08-31 NOTE — ASSESSMENT
[FreeTextEntry1] : Lab work, Venous duplex report reviewed.\par 62 year old female with history of T2N0M0 (2.2cm), Stage IIA RIGHT breast triple negative invasive ductal carcinoma, s/p adjuvant TC chemotherapy and RT-2011.\par \par 4/25/2022 - S/P bilateral mastectomies with left axillary sentinel node biopsy on 4/25/22. Pathology revealed:\par 1) RIGHT Breast: focal ADH\par 2) LEFT Breast: Invasive poorly differentiated ductal carcinoma (1.8 cm), DCIS, 2 benign left axillary lymph nodes (one of which was a sentinel LN).\par  ER 0%; OH 0%; HER 2 + by FISH.\par \par She is currently receiving Taxol/Kanjinti-last week's treatment held due to redness reported at port site with discomfort. PB and port blood cultures negative, and symptoms now improved-clinically stable for treatment today, to which patient consents.\par Last echocardiogram 8/2022-LVEF 66%. Cardiology f/u planned.\par \par H/O leukopenia-clinically secondary to chemotherapy. ANC acceptable today for treatment. Continue to monitor CBC with differential.\par \par Patient was given the opportunity to ask questions.  Her questions have been answered to her apparent satisfaction at this time.  She expressed her understanding and willingness to comply with recommended follow-up.\par \par

## 2022-09-06 ENCOUNTER — APPOINTMENT (OUTPATIENT)
Dept: OBGYN | Facility: CLINIC | Age: 62
End: 2022-09-06

## 2022-09-06 VITALS
SYSTOLIC BLOOD PRESSURE: 118 MMHG | BODY MASS INDEX: 28.16 KG/M2 | DIASTOLIC BLOOD PRESSURE: 79 MMHG | WEIGHT: 153 LBS | HEIGHT: 62 IN

## 2022-09-06 PROCEDURE — 99386 PREV VISIT NEW AGE 40-64: CPT

## 2022-09-06 NOTE — PLAN
[FreeTextEntry1] : 61 y/o female presenting for annual exam:\par -f/u pap \par -Contraception: pt not sexually active for the last 8 years\par -f/u PRN\par

## 2022-09-06 NOTE — HISTORY OF PRESENT ILLNESS
[Y] : Positive pregnancy history [Currently In Menopause] : currently in menopause [Menopause Age: ____] : age at menopause was [unfilled] [No] : No [PGHxTotal] : 4 [[facility] : 4 [[facility] : 4 [FreeTextEntry1] :  x4

## 2022-09-06 NOTE — PHYSICAL EXAM
[Chaperone Present] : A chaperone was present in the examining room during all aspects of the physical examination [Appropriately responsive] : appropriately responsive [Alert] : alert [No Acute Distress] : no acute distress [Soft] : soft [Non-tender] : non-tender [Non-distended] : non-distended [No Lesions] : no lesions [No Mass] : no mass [Oriented x3] : oriented x3 [Right Breast Absent] : a total mastectomy [Left Breast Absent] : a total mastectomy [Labia Majora] : normal [Labia Minora] : normal [Normal] : normal [Cervical Stenosis] : cervical stenosis [Absent] : absent [Uterine Adnexae] : normal [FreeTextEntry1] : Fatoumata

## 2022-09-07 ENCOUNTER — NON-APPOINTMENT (OUTPATIENT)
Age: 62
End: 2022-09-07

## 2022-09-08 NOTE — ASSESSMENT
[FreeTextEntry1] : Ordered blood cultures peripheral and port.\par Rx sent to pharmacy for Levaquin 500 mg x 7days\par Ordered doppler u/s upper extremity to r/o clot\par Ordered port study to further evaluate port function\par follow up with Dr Canela \par

## 2022-09-08 NOTE — HISTORY OF PRESENT ILLNESS
[de-identified] : Patient here for chemo today c/o pain, discomfort, and redness around the area of her port x 2days.  Also complaining the pain radiates to the right shoulder.  She has

## 2022-09-09 LAB — HPV HIGH+LOW RISK DNA PNL CVX: NOT DETECTED

## 2022-09-10 ENCOUNTER — RESULT REVIEW (OUTPATIENT)
Age: 62
End: 2022-09-10

## 2022-09-10 ENCOUNTER — APPOINTMENT (OUTPATIENT)
Dept: INFUSION THERAPY | Facility: HOSPITAL | Age: 62
End: 2022-09-10

## 2022-09-10 LAB
BASOPHILS # BLD AUTO: 0.03 K/UL — SIGNIFICANT CHANGE UP (ref 0–0.2)
BASOPHILS NFR BLD AUTO: 0.8 % — SIGNIFICANT CHANGE UP (ref 0–2)
EOSINOPHIL # BLD AUTO: 0.13 K/UL — SIGNIFICANT CHANGE UP (ref 0–0.5)
EOSINOPHIL NFR BLD AUTO: 3.3 % — SIGNIFICANT CHANGE UP (ref 0–6)
HCT VFR BLD CALC: 30.3 % — LOW (ref 34.5–45)
HGB BLD-MCNC: 10.1 G/DL — LOW (ref 11.5–15.5)
IMM GRANULOCYTES NFR BLD AUTO: 1.8 % — HIGH (ref 0–1.5)
LYMPHOCYTES # BLD AUTO: 1.47 K/UL — SIGNIFICANT CHANGE UP (ref 1–3.3)
LYMPHOCYTES # BLD AUTO: 36.8 % — SIGNIFICANT CHANGE UP (ref 13–44)
MCHC RBC-ENTMCNC: 28.3 PG — SIGNIFICANT CHANGE UP (ref 27–34)
MCHC RBC-ENTMCNC: 33.3 G/DL — SIGNIFICANT CHANGE UP (ref 32–36)
MCV RBC AUTO: 84.9 FL — SIGNIFICANT CHANGE UP (ref 80–100)
MONOCYTES # BLD AUTO: 0.4 K/UL — SIGNIFICANT CHANGE UP (ref 0–0.9)
MONOCYTES NFR BLD AUTO: 10 % — SIGNIFICANT CHANGE UP (ref 2–14)
NEUTROPHILS # BLD AUTO: 1.89 K/UL — SIGNIFICANT CHANGE UP (ref 1.8–7.4)
NEUTROPHILS NFR BLD AUTO: 47.3 % — SIGNIFICANT CHANGE UP (ref 43–77)
NRBC # BLD: 0 /100 WBCS — SIGNIFICANT CHANGE UP (ref 0–0)
PLATELET # BLD AUTO: 270 K/UL — SIGNIFICANT CHANGE UP (ref 150–400)
RBC # BLD: 3.57 M/UL — LOW (ref 3.8–5.2)
RBC # FLD: 17.8 % — HIGH (ref 10.3–14.5)
WBC # BLD: 3.99 K/UL — SIGNIFICANT CHANGE UP (ref 3.8–10.5)
WBC # FLD AUTO: 3.99 K/UL — SIGNIFICANT CHANGE UP (ref 3.8–10.5)

## 2022-09-11 LAB
ALBUMIN SERPL ELPH-MCNC: 4.4 G/DL — SIGNIFICANT CHANGE UP (ref 3.3–5)
ALP SERPL-CCNC: 86 U/L — SIGNIFICANT CHANGE UP (ref 40–120)
ALT FLD-CCNC: 15 U/L — SIGNIFICANT CHANGE UP (ref 10–45)
ANION GAP SERPL CALC-SCNC: 12 MMOL/L — SIGNIFICANT CHANGE UP (ref 5–17)
AST SERPL-CCNC: 15 U/L — SIGNIFICANT CHANGE UP (ref 10–40)
BILIRUB SERPL-MCNC: 0.2 MG/DL — SIGNIFICANT CHANGE UP (ref 0.2–1.2)
BUN SERPL-MCNC: 13 MG/DL — SIGNIFICANT CHANGE UP (ref 7–23)
CALCIUM SERPL-MCNC: 9 MG/DL — SIGNIFICANT CHANGE UP (ref 8.4–10.5)
CHLORIDE SERPL-SCNC: 102 MMOL/L — SIGNIFICANT CHANGE UP (ref 96–108)
CO2 SERPL-SCNC: 23 MMOL/L — SIGNIFICANT CHANGE UP (ref 22–31)
CREAT SERPL-MCNC: 0.52 MG/DL — SIGNIFICANT CHANGE UP (ref 0.5–1.3)
CYTOLOGY CVX/VAG DOC THIN PREP: ABNORMAL
EGFR: 105 ML/MIN/1.73M2 — SIGNIFICANT CHANGE UP
GLUCOSE SERPL-MCNC: 107 MG/DL — HIGH (ref 70–99)
POTASSIUM SERPL-MCNC: 4.3 MMOL/L — SIGNIFICANT CHANGE UP (ref 3.5–5.3)
POTASSIUM SERPL-SCNC: 4.3 MMOL/L — SIGNIFICANT CHANGE UP (ref 3.5–5.3)
PROT SERPL-MCNC: 6.5 G/DL — SIGNIFICANT CHANGE UP (ref 6–8.3)
SODIUM SERPL-SCNC: 137 MMOL/L — SIGNIFICANT CHANGE UP (ref 135–145)

## 2022-09-12 ENCOUNTER — NON-APPOINTMENT (OUTPATIENT)
Age: 62
End: 2022-09-12

## 2022-09-13 ENCOUNTER — NON-APPOINTMENT (OUTPATIENT)
Age: 62
End: 2022-09-13

## 2022-09-20 ENCOUNTER — APPOINTMENT (OUTPATIENT)
Dept: SURGICAL ONCOLOGY | Facility: CLINIC | Age: 62
End: 2022-09-20

## 2022-09-21 ENCOUNTER — APPOINTMENT (OUTPATIENT)
Dept: INFUSION THERAPY | Facility: HOSPITAL | Age: 62
End: 2022-09-21

## 2022-09-22 ENCOUNTER — FORM ENCOUNTER (OUTPATIENT)
Age: 62
End: 2022-09-22

## 2022-09-22 ENCOUNTER — APPOINTMENT (OUTPATIENT)
Dept: SLEEP CENTER | Facility: CLINIC | Age: 62
End: 2022-09-22

## 2022-09-22 PROCEDURE — ZZZZZ: CPT

## 2022-09-23 ENCOUNTER — OUTPATIENT (OUTPATIENT)
Dept: OUTPATIENT SERVICES | Facility: HOSPITAL | Age: 62
LOS: 1 days | End: 2022-09-23
Payer: MEDICAID

## 2022-09-23 ENCOUNTER — APPOINTMENT (OUTPATIENT)
Dept: SLEEP CENTER | Facility: CLINIC | Age: 62
End: 2022-09-23

## 2022-09-23 DIAGNOSIS — Z98.890 OTHER SPECIFIED POSTPROCEDURAL STATES: Chronic | ICD-10-CM

## 2022-09-23 DIAGNOSIS — Z90.710 ACQUIRED ABSENCE OF BOTH CERVIX AND UTERUS: Chronic | ICD-10-CM

## 2022-09-23 DIAGNOSIS — Z92.21 PERSONAL HISTORY OF ANTINEOPLASTIC CHEMOTHERAPY: Chronic | ICD-10-CM

## 2022-09-23 PROCEDURE — 95806 SLEEP STUDY UNATT&RESP EFFT: CPT | Mod: 26

## 2022-09-23 PROCEDURE — 95806 SLEEP STUDY UNATT&RESP EFFT: CPT

## 2022-09-28 ENCOUNTER — APPOINTMENT (OUTPATIENT)
Dept: CARDIOLOGY | Facility: CLINIC | Age: 62
End: 2022-09-28

## 2022-09-28 VITALS
WEIGHT: 151 LBS | OXYGEN SATURATION: 95 % | DIASTOLIC BLOOD PRESSURE: 74 MMHG | HEIGHT: 61.5 IN | HEART RATE: 70 BPM | SYSTOLIC BLOOD PRESSURE: 143 MMHG | TEMPERATURE: 97.8 F | BODY MASS INDEX: 28.14 KG/M2

## 2022-09-28 PROCEDURE — 99214 OFFICE O/P EST MOD 30 MIN: CPT

## 2022-09-28 NOTE — REASON FOR VISIT
[FreeTextEntry1] : 62 F\par Breast CA s/p mastectomy April 2022.  S/p Chemotherapy Aug 2022, now on immunotherapy.  R mediport.  \par \par Here for evaluation of her legs.  She reports prior venous proceudres in claudia.  \par \par both feet are numb (chemotherapy?)\par \par bilateral edema\par bilaetrteal R>L VV\par \par Left inner groin is burning and bothersome \par \par no CP \par Wearing compressoin garments, which helps keep her veins compressed, but still symptomatic\par R leg although visually more impressive is not bothersome to her

## 2022-09-28 NOTE — ASSESSMENT
[FreeTextEntry1] : Assessment;\par 1.  Venous insufficiency\par 2.  Phlebolymphedema\par 3. Prior L leg venous phlebectomy\par 4.  breast CA s/p mastectomy\par \par Plan\par 1.  Venous duplex to assure no DVT or SVT\par 2.  Would not suggest any venous procedures at this time, let her recover from recent surgery and cancer diagnosis.\par 3.  Continue compression pantyhose daily \par 4.  Patient has phlebo- lymphedema and has tried conservative therapies such as compression stockings and leg elevation for over one month, but still remains symptomatic.  There is hyperpigmentation and lipodermatosclerosis on exam.  Will arrange for a pneumatic pump.\par 5.  RTC in 3-6 months, if remains symptomatic, then will refer for reflux study and eval for stabl phlebectomy

## 2022-10-11 ENCOUNTER — APPOINTMENT (OUTPATIENT)
Dept: SURGICAL ONCOLOGY | Facility: CLINIC | Age: 62
End: 2022-10-11

## 2022-10-11 VITALS — TEMPERATURE: 98.8 F

## 2022-10-11 VITALS
HEIGHT: 61 IN | SYSTOLIC BLOOD PRESSURE: 151 MMHG | DIASTOLIC BLOOD PRESSURE: 80 MMHG | BODY MASS INDEX: 27.75 KG/M2 | WEIGHT: 147 LBS | HEART RATE: 65 BPM

## 2022-10-11 PROCEDURE — 99214 OFFICE O/P EST MOD 30 MIN: CPT

## 2022-10-11 NOTE — PHYSICAL EXAM
[Normal] : supple, no neck mass and thyroid not enlarged [Normal Neck Lymph Nodes] : normal neck lymph nodes  [Normal Supraclavicular Lymph Nodes] : normal supraclavicular lymph nodes [Normal Groin Lymph Nodes] : normal groin lymph nodes [Normal Axillary Lymph Nodes] : normal axillary lymph nodes [Normal] : oriented to person, place and time, with appropriate affect [de-identified] : s/P bilateral mastectomies without any local recurrence; hematoma resolved

## 2022-10-11 NOTE — ASSESSMENT
[FreeTextEntry1] : IMP: \par \par She is s/p Bilateral mastectomies with left axillary sentinel node biopsy on 4/25/22.  \par Pathology revealed:\par 1) RIGHT Breast: focal ADH\par 2) LEFT Breast: Invasive poorly differentiated ductal carcinoma (1.8 cm), DCIS, 2 benign left axillary lymph nodes (one of which was a sentinel node), negative margins.  T1cN0, ER-ND-HER2 equivocal (POSITIVE by in situ hybridization).\par \par Adjuvant chemotherapy w/ Dr. Linnette Ortega\par \par \par PLAN:\par chemotherapy is done; just Herceptin continues\par RTO Q3 months\par

## 2022-10-11 NOTE — HISTORY OF PRESENT ILLNESS
[de-identified] : ARI LOYA  is a 62 year old female here for a follow-up visit, s/p Bilateral mastectomies with left axillary sentinel node biopsy on 4/25/22.\par \par In 2011, pt was diagnosed with T2N0M0 (2.2cm), stage IIA RIGHT breast triple negative invasive ductal carcinoma with Alexx score 8 to 9.  She underwent lumpectomy Dr. Keyon Amezquita and then received adjuvant chemotherapy with taxotere and cytoxan for 4 cycles.  She then received IMRT with 5000cGy in 25 fractions followed by boost to the cavity of 1000cGy in 5 fractions 11/9/11-12/21/11 with Dr. Veena Walls. \par \par Pt underwent left breast 3:00 4cmfn US guided biopsy on 1/31/22 which showed invasive poorly differentiated ductal carcinoma, [place] score 8/9 (3+3+2), invasive tumor at least 0.8cm, micro calcifications present, no LVI.  ER-/FL-, HER2 equivocal pending CISH. \par \par MRI show an extensive area of enhancement area the biopsy site, CT (CAP) shows mediastinal adenopathy. Subsequent biopsies of the breast and the supraclavicular lymph node were negative. BRCA: no significant mutation. Plan for bilateral mastectomies & axillary sentinel node biopsy\par \par She is s/p bilateral mastectomies with left axillary sentinel node biopsy on 4/25/22.  Pathology revealed:\par 1) RIGHT Breast: focal ADH\par 2) LEFT Breast: Invasive poorly differentiated ductal carcinoma (1.8 cm), DCIS, 2 benign left axillary lymph nodes (one of which was a sentinel node), negative margins.  T1cN0, ER-FL-HER2 equivocal (POSITIVE by in situ hybridization).\par \par Post-op had left breast hematoma that was drained in office and was noted to be resorbing when seen by my colleague, Dr Fatima. \par Hematoma re-absorbing\par cellulitis on left chest wall 7/25/22 improving, gave Duricef 500 mg BID \par \par

## 2022-10-12 ENCOUNTER — APPOINTMENT (OUTPATIENT)
Dept: PULMONOLOGY | Facility: CLINIC | Age: 62
End: 2022-10-12

## 2022-10-12 ENCOUNTER — APPOINTMENT (OUTPATIENT)
Dept: INFUSION THERAPY | Facility: HOSPITAL | Age: 62
End: 2022-10-12

## 2022-10-12 ENCOUNTER — OUTPATIENT (OUTPATIENT)
Dept: OUTPATIENT SERVICES | Facility: HOSPITAL | Age: 62
LOS: 1 days | Discharge: HOME | End: 2022-10-12

## 2022-10-12 DIAGNOSIS — C50.919 MALIGNANT NEOPLASM OF UNSPECIFIED SITE OF UNSPECIFIED FEMALE BREAST: ICD-10-CM

## 2022-10-12 DIAGNOSIS — Z92.21 PERSONAL HISTORY OF ANTINEOPLASTIC CHEMOTHERAPY: Chronic | ICD-10-CM

## 2022-10-12 DIAGNOSIS — Z98.890 OTHER SPECIFIED POSTPROCEDURAL STATES: Chronic | ICD-10-CM

## 2022-10-12 DIAGNOSIS — Z90.710 ACQUIRED ABSENCE OF BOTH CERVIX AND UTERUS: Chronic | ICD-10-CM

## 2022-10-16 NOTE — PHYSICAL EXAM
[Normal] : well developed, well nourished, no acute distress [Normal Conjunctiva] : normal conjunctiva [No Xanthelasma] : no xanthelasma [Normal Venous Pressure] : normal venous pressure [No Carotid Bruit] : no carotid bruit [No Murmur] : no murmur [Normal S1, S2] : normal S1, S2 [No Rub] : no rub [No Gallop] : no gallop [Clear Lung Fields] : clear lung fields [Good Air Entry] : good air entry [No Respiratory Distress] : no respiratory distress  [Soft] : abdomen soft [Normal Gait] : normal gait [Gait - Sufficient for Exercise Testing] : gait - sufficient for exercise testing [Normal DP B/L] : normal DP B/L [Edema ___] : edema [unfilled] [Venous varicosities] : venous varicosities [No Rash] : no rash [Moves all extremities] : moves all extremities [Alert and Oriented] : alert and oriented

## 2022-10-17 ENCOUNTER — APPOINTMENT (OUTPATIENT)
Dept: CARDIOLOGY | Facility: CLINIC | Age: 62
End: 2022-10-17

## 2022-10-17 VITALS
HEIGHT: 61 IN | BODY MASS INDEX: 27.75 KG/M2 | HEART RATE: 61 BPM | OXYGEN SATURATION: 99 % | TEMPERATURE: 98.1 F | DIASTOLIC BLOOD PRESSURE: 72 MMHG | RESPIRATION RATE: 16 BRPM | SYSTOLIC BLOOD PRESSURE: 144 MMHG | WEIGHT: 147 LBS

## 2022-10-17 PROCEDURE — 93010 ELECTROCARDIOGRAM REPORT: CPT

## 2022-10-17 PROCEDURE — 99215 OFFICE O/P EST HI 40 MIN: CPT | Mod: 25

## 2022-10-17 PROCEDURE — 93000 ELECTROCARDIOGRAM COMPLETE: CPT

## 2022-10-17 NOTE — HISTORY OF PRESENT ILLNESS
[FreeTextEntry1] : Interval History:\par Saw Dr. Mc who prescribed compression hose. She is waiting for delivery.\par LE edema is about the same. She continues to be fatigued. No chest pain. No palpitations.\par She is on adjuvant Kajinti (trastuzumab bio-similar) \par \par \par History:\par T2N0M0- invasive ductal carcinoma. ER/VT negative, HER2 negative. Received 4 cycles of adjuvant TC with Neulasta. Had bone pains from Neulasta. She then received IMRT with 5000cGy in 25 fractions followed by boost to the cavity of 1000cGy in 5 fractions- 11/9/11-12/21/11.\par \par She has a history of hypertension, ELVIN not able to tolerate CPAP, and chronic dyspnea on exertion for which she saw a Pulmonologist.  She denies chest pain. She had syncope on hot days in setting of dehydration she says. No known cardiovascular disease. She reports a treadmill exercise stress test done a few months ago by Dr. Whitney at [facility] [address] was normal. She can complete the flight of stairs up to her apartment without stopping.\par \par No history of diabetes, cigarette smoking. Several family members had heart disease - her brother had MI in his late 50s. Another brother had a pacemaker placed. A daughter required surgery for valve problem.\par \par 7/18/2022:\feng Returns for follow up with daughter Shobha. Complains of bilateral lower extremity edema, worse since starting Taxol/Herceptin, but present for years prior. Notes painful varicose veins. A procedure was performed on the left leg previously. Started spironolactone 12.5 mg daily, but minimal improvement.. She used compression in the past. \par \par Still with CLARK of a few blocks. Echo prior to starting trastuzumab - normal EF and GLS and no structural heart disease. No chest pain. No palpitations. No orthopnea.\par \par Higher doses of BP meds caused low BP before.\par \par Cardiovascular Testing:\par -----------------------------------------------------------\par ECG:\par 10/17/2022: NSR 66 bpm, normal ECG\par 7/18/2022: NSR 71 bpm, normal ECG\par 3/31/2022: NSR and normal ECG\par  ----------------------------------------------------------\par Echo:\par 8/4/2022: EF 66 %, mild diastolic dysfunction.\par 5/20/2022: EF 60-65 %, -29.3 %, mild MR, normal aortic valve\par -----------------------------------------------------------\par Stress:\par 8/4/2022: submaximal HR response on treadmill exercise\par -----------------------------------------------------------\par CT\par 2/2022: hilar adenopathy, normal vessels. No CACs\par ----------------------------------------------------------

## 2022-10-17 NOTE — REASON FOR VISIT
[Family Member] : family member [FreeTextEntry3] : Dr. Ortega [FreeTextEntry1] : ARI LOYA is a 62 year woman with a history of triple negative right sided invasive ductal carcinoma in 2011, now with recurrent left intraductal carcinoma, here for follow up dyspnea and lower extremity edema.\par \par Prior Cancer Treatments:\par ------------------------------------------------------------------------\par Chemo/targeted therapy:\par 2011: adjuvant TH\par 5/2022-831/2022: adjuvant TH\par 9/10/2022 - Bhavesh q3 weeks to complete one year\par ------------------------------------------------------------------------\par Surgery:\par 2011: R lumpectomy\par 4/25/2022: Bilateral mastectomy with left axillary sentinel node biopsy\par ------------------------------------------------------------------------\par Radiation:\par 2011: IMRT 5000cGy, followed by 1000 cGy to cavity

## 2022-10-17 NOTE — ASSESSMENT
[FreeTextEntry1] : 62 year old woman with recurrent breast cancer currently on adjuvant HER-2 targeted therapy (Bhavesh) with complaints of dyspnea on exertion and venous insufficiency/lymphedema.\par \par Venous insufficiency with varicose veins:\par - saw Vascular, trial of compression prior to phlebectomy\par \par Hypertension: BP elevated, but reportedly intolerant of antihypertensive therapy previously. Needs better control.\par - continue spironolactone 25 mg daily for now\par - will try atenolol 25 mg daily now for exertional HTN and because of side effect profile\par - discussed symptoms of hypotension\par \par Risk for cardiotoxicity during HER2 targeted therapy\par - optimize risk factors\par - continue screening with serial echo + GLS every 3 months - due for repeat ~ 2 months\par - follow up in 6 months with me\par \par Above recommendations discussed with the patient and her daughter and all questions answered to the best of my ability and to their apparent satisfaction.

## 2022-10-17 NOTE — REVIEW OF SYSTEMS
[Dyspnea on exertion] : dyspnea during exertion [Lower Ext Edema] : lower extremity edema [Numbness (Hypoesthesia)] : numbness [Tingling (Paresthesia)] : tingling [Negative] : Psychiatric [Feeling Fatigued] : feeling fatigued [Depression] : no depression [de-identified] : on left side.  [de-identified] : epistaxis

## 2022-10-19 ENCOUNTER — RESULT REVIEW (OUTPATIENT)
Age: 62
End: 2022-10-19

## 2022-10-19 ENCOUNTER — APPOINTMENT (OUTPATIENT)
Dept: INFUSION THERAPY | Facility: HOSPITAL | Age: 62
End: 2022-10-19

## 2022-10-19 ENCOUNTER — APPOINTMENT (OUTPATIENT)
Dept: HEMATOLOGY ONCOLOGY | Facility: CLINIC | Age: 62
End: 2022-10-19

## 2022-10-19 VITALS — RESPIRATION RATE: 16 BRPM | HEART RATE: 72 BPM

## 2022-10-19 LAB
ALBUMIN SERPL ELPH-MCNC: 4.4 G/DL — SIGNIFICANT CHANGE UP (ref 3.3–5)
ALP SERPL-CCNC: 85 U/L — SIGNIFICANT CHANGE UP (ref 40–120)
ALT FLD-CCNC: 16 U/L — SIGNIFICANT CHANGE UP (ref 10–45)
ANION GAP SERPL CALC-SCNC: 14 MMOL/L — SIGNIFICANT CHANGE UP (ref 5–17)
AST SERPL-CCNC: 18 U/L — SIGNIFICANT CHANGE UP (ref 10–40)
BILIRUB SERPL-MCNC: 0.3 MG/DL — SIGNIFICANT CHANGE UP (ref 0.2–1.2)
BUN SERPL-MCNC: 11 MG/DL — SIGNIFICANT CHANGE UP (ref 7–23)
CALCIUM SERPL-MCNC: 8.9 MG/DL — SIGNIFICANT CHANGE UP (ref 8.4–10.5)
CHLORIDE SERPL-SCNC: 100 MMOL/L — SIGNIFICANT CHANGE UP (ref 96–108)
CO2 SERPL-SCNC: 22 MMOL/L — SIGNIFICANT CHANGE UP (ref 22–31)
CREAT SERPL-MCNC: 0.51 MG/DL — SIGNIFICANT CHANGE UP (ref 0.5–1.3)
EGFR: 105 ML/MIN/1.73M2 — SIGNIFICANT CHANGE UP
GLUCOSE SERPL-MCNC: 138 MG/DL — HIGH (ref 70–99)
HCT VFR BLD CALC: 35.5 % — SIGNIFICANT CHANGE UP (ref 34.5–45)
HGB BLD-MCNC: 11.4 G/DL — LOW (ref 11.5–15.5)
MCHC RBC-ENTMCNC: 28.9 PG — SIGNIFICANT CHANGE UP (ref 27–34)
MCHC RBC-ENTMCNC: 32.1 GM/DL — SIGNIFICANT CHANGE UP (ref 32–36)
MCV RBC AUTO: 89.9 FL — SIGNIFICANT CHANGE UP (ref 80–100)
PLATELET # BLD AUTO: 281 K/UL — SIGNIFICANT CHANGE UP (ref 150–400)
POTASSIUM SERPL-MCNC: 4.3 MMOL/L — SIGNIFICANT CHANGE UP (ref 3.5–5.3)
POTASSIUM SERPL-SCNC: 4.3 MMOL/L — SIGNIFICANT CHANGE UP (ref 3.5–5.3)
PROT SERPL-MCNC: 6.6 G/DL — SIGNIFICANT CHANGE UP (ref 6–8.3)
RBC # BLD: 3.95 M/UL — SIGNIFICANT CHANGE UP (ref 3.8–5.2)
RBC # FLD: 13.9 % — SIGNIFICANT CHANGE UP (ref 10.3–14.5)
SODIUM SERPL-SCNC: 136 MMOL/L — SIGNIFICANT CHANGE UP (ref 135–145)
WBC # BLD: 4.26 K/UL — SIGNIFICANT CHANGE UP (ref 3.8–10.5)
WBC # FLD AUTO: 4.26 K/UL — SIGNIFICANT CHANGE UP (ref 3.8–10.5)

## 2022-10-19 PROCEDURE — 99214 OFFICE O/P EST MOD 30 MIN: CPT

## 2022-10-19 NOTE — REVIEW OF SYSTEMS
[Negative] : Allergic/Immunologic [Confused] : no confusion [Dizziness] : no dizziness [Fainting] : no fainting [FreeTextEntry9] : M-S aches since chemo

## 2022-10-19 NOTE — PHYSICAL EXAM
[Normal] : affect appropriate [de-identified] : non-toxic appearing [de-identified] : right CW port site-no erythema, swelling or tenderness [de-identified] : b/l CW without nodularity [de-identified] : No gross focal motor deficits

## 2022-10-19 NOTE — ASSESSMENT
[FreeTextEntry1] : CBC, CMP, 10/17/22 cardiology note, 10/11/22 surgery note reviewed.\par 62 year old female with history of T2N0M0 (2.2cm), Stage IIA RIGHT breast triple negative invasive ductal carcinoma, s/p adjuvant TC chemotherapy and RT-2011.\par \par 4/25/2022 - S/P bilateral mastectomies with left axillary sentinel node biopsy on 4/25/22. Pathology revealed:\par 1) RIGHT Breast: focal ADH\par 2) LEFT Breast: Invasive poorly differentiated ductal carcinoma (1.8 cm), DCIS, 2 benign left axillary lymph nodes (one of which was a sentinel LN).\par  ER 0%; GA 0%; HER 2 + by FISH.\par Completed Taxol portion of treatment regimen 8/31/22.\par Clinically stable for Bhavesh today. Will obtain brain MRI in light of persistent H/A's.\par Last echocardiogram 8/2022-LVEF 66%. Cardiology f/u planned.\par \par H/O leukopenia-clinically secondary to chemotherapy. Continue to monitor CBC with differential.\par \par Patient was given the opportunity to ask questions.  Her questions have been answered to her apparent satisfaction at this time.  She expressed her understanding and willingness to comply with recommended follow-up.\par \par

## 2022-10-19 NOTE — HISTORY OF PRESENT ILLNESS
[Disease: _____________________] : Disease: [unfilled] [T: ___] : T[unfilled] [N: ___] : N[unfilled] [AJCC Stage: ____] : AJCC Stage: [unfilled] [de-identified] : 2011-T2N0M0 (2.2cm), Stage IIA RIGHT breast triple negative invasive ductal carcinoma with [place] score 8 to 9. S/P right lumpectomy (Dr. Keyon Amezquita)--> adjuvant chemotherapy with taxotere and cytoxan for 4 cycles--> IMRT with 5000cGy in 25 fractions followed by boost to the cavity of 1000cGy in 5 fractions (11/9/11-12/21/11 with Dr. Veena Walls). \par \par 12/28/21-Bilateral mammo/US showed left breast 2-3:00 4cmfn 1.4cm irregular mass suspicious of malignancy, US core biopsy recommended; grouped heterogenous calcifications at right breast 10:00 anterior depth also suspicious for malignancy, bx recommended, [id number]. \par \par 1/31/2022-S/P left breast 3:00 4cmfn US guided biopsy which showed invasive poorly differentiated ductal carcinoma, taylor score 8/9 (3+3+2), invasive tumor at least 0.8cm, microcalcifications present, no LVI. ER negative <1%, MS negative <1%, HER2 2+ IHC, FISH+. \par \par 2/14/2022-MRI breast-left breast 3:00 biopsy proven cancer associated with 1.4cm mass enhancement;adjacent contiguous large area of linerar nonmassenhancement seen extending from biopsy site into retroareolar/central/posterior left breast up to 9.4cm, 2 site MRI biopsy rec.; [id number].\par \par 3/1/2022-MRI biopsy of both areas showed fibrocystic changes including sclerosing adenosis \par \par 2/15/2022-CT C/A/P-showed multiple mildly enlarged mediastinal and hilar LNs, increased in size compard to7/10/2020. \par Same day bone scan negative for osseous metastasis. \par \par 3/2/2022-PET scan-showed known left breast cancer,adjacent 4.4cm hematoma, indeterminate FDG avid left supraclavicular, mediastinal and hilar LAD new and/or enlarged since 7/10/2020, mediastinal and hilar LNs symmetric distribution suggests a benign etiology;left supraclavicular LN access to US guided biopsy; subcm minimallyFDGavid left axillary LN nonspecific, may be biopsied. \par \par 3/15/2022-Left supraclavicular LN US guided FNA was negative for malignant cells. \par \par Saw Dr. Anmol Sanford pulmonary on 4/11/22. Followup after surgery to assess the mediastinal LNs-felt unlikely related to breast cancer. S/P Dr. Sanford - biopsy negative for malignancy; + granulomatous inflammation\par \par 4/25/2022 - S/P bilateral mastectomies with left axillary sentinel node biopsy on 4/25/22. Pathology revealed:\par 1) RIGHT Breast: focal ADH\par 2) LEFT Breast: Invasive poorly differentiated ductal carcinoma (1.8 cm), DCIS, 2 benign left axillary lymph nodes (one of which was a sentinel LN).\par  ER 0%; MS 0%; HER 2 + by FISH.\par \par 6/9/2022-Taxol/Kanjinti begun. Taxol completed 8/31/2022. Continues with Kanjinti.\par  [de-identified] : Invasive poorly differentiated ductal carcinoma [de-identified] : ER-/DC-/Her 2+ [de-identified] : 11/2019-My Risk genetic testing negative. BRCA 1/BRCA 2 negative.\par \par Patient under the oncology care of Dr. Abernathy through 7/2022.\par \par Sister leukemia dx'd age 40. Mother had multiple myeloma. Brother stomach cancer age 68. Daughter with CLL age 30. Paternal 2nd cousin breast cancer age 49. Paternal 2nd cousin liver cancer age 40 had hepatitis. Family history is negative for cancer of the ovary, endometrium, prostate, pancreatic and melanoma. \par The patient is not of Ashkenazi ethnic background. \par \par  [de-identified] : Has h/o H/A's ~2-3 x/week-pre-dated chemo. M-S aches-L>R. Feet and left hand with mild paresthesias. No change in vision reported.\par No fevers.\par No current pulmonary/GI//complaints.\par Sees cardiologist for echo monitoring-last 8/4/22.\par Saw endocrinologist yesterday ([facility]), Dr.Halis Brewster-plans to start either Zometa or Prolia for her osteoporosis-patient aware to get dental clearance first. Had dental implant 1 month ago.\par Daughter Shobha present (is a nurse at [employer], not working currently), and assisted in providing history.\par \feng Has had COVID vaccines (Pfizer).

## 2022-10-20 DIAGNOSIS — Z51.11 ENCOUNTER FOR ANTINEOPLASTIC CHEMOTHERAPY: ICD-10-CM

## 2022-10-20 DIAGNOSIS — R11.2 NAUSEA WITH VOMITING, UNSPECIFIED: ICD-10-CM

## 2022-10-22 ENCOUNTER — APPOINTMENT (OUTPATIENT)
Dept: INFUSION THERAPY | Facility: HOSPITAL | Age: 62
End: 2022-10-22

## 2022-10-25 DIAGNOSIS — G47.33 OBSTRUCTIVE SLEEP APNEA (ADULT) (PEDIATRIC): ICD-10-CM

## 2022-10-27 ENCOUNTER — APPOINTMENT (OUTPATIENT)
Dept: PULMONOLOGY | Facility: CLINIC | Age: 62
End: 2022-10-27

## 2022-10-27 PROCEDURE — 99441: CPT

## 2022-11-01 ENCOUNTER — RESULT REVIEW (OUTPATIENT)
Age: 62
End: 2022-11-01

## 2022-11-09 ENCOUNTER — APPOINTMENT (OUTPATIENT)
Dept: INFUSION THERAPY | Facility: HOSPITAL | Age: 62
End: 2022-11-09

## 2022-11-09 ENCOUNTER — RESULT REVIEW (OUTPATIENT)
Age: 62
End: 2022-11-09

## 2022-11-09 ENCOUNTER — APPOINTMENT (OUTPATIENT)
Dept: HEMATOLOGY ONCOLOGY | Facility: CLINIC | Age: 62
End: 2022-11-09

## 2022-11-09 VITALS
OXYGEN SATURATION: 98 % | HEART RATE: 50 BPM | SYSTOLIC BLOOD PRESSURE: 147 MMHG | RESPIRATION RATE: 16 BRPM | TEMPERATURE: 97 F | DIASTOLIC BLOOD PRESSURE: 71 MMHG

## 2022-11-09 LAB
ALBUMIN SERPL ELPH-MCNC: 4.4 G/DL — SIGNIFICANT CHANGE UP (ref 3.3–5)
ALP SERPL-CCNC: 98 U/L — SIGNIFICANT CHANGE UP (ref 40–120)
ALT FLD-CCNC: 27 U/L — SIGNIFICANT CHANGE UP (ref 10–45)
ANION GAP SERPL CALC-SCNC: 12 MMOL/L — SIGNIFICANT CHANGE UP (ref 5–17)
AST SERPL-CCNC: 17 U/L — SIGNIFICANT CHANGE UP (ref 10–40)
BASOPHILS # BLD AUTO: 0.03 K/UL — SIGNIFICANT CHANGE UP (ref 0–0.2)
BASOPHILS NFR BLD AUTO: 0.8 % — SIGNIFICANT CHANGE UP (ref 0–2)
BILIRUB SERPL-MCNC: 0.2 MG/DL — SIGNIFICANT CHANGE UP (ref 0.2–1.2)
BUN SERPL-MCNC: 14 MG/DL — SIGNIFICANT CHANGE UP (ref 7–23)
CALCIUM SERPL-MCNC: 9.2 MG/DL — SIGNIFICANT CHANGE UP (ref 8.4–10.5)
CHLORIDE SERPL-SCNC: 98 MMOL/L — SIGNIFICANT CHANGE UP (ref 96–108)
CO2 SERPL-SCNC: 24 MMOL/L — SIGNIFICANT CHANGE UP (ref 22–31)
CREAT SERPL-MCNC: 0.61 MG/DL — SIGNIFICANT CHANGE UP (ref 0.5–1.3)
EGFR: 101 ML/MIN/1.73M2 — SIGNIFICANT CHANGE UP
EOSINOPHIL # BLD AUTO: 0.14 K/UL — SIGNIFICANT CHANGE UP (ref 0–0.5)
EOSINOPHIL NFR BLD AUTO: 3.6 % — SIGNIFICANT CHANGE UP (ref 0–6)
GLUCOSE SERPL-MCNC: 147 MG/DL — HIGH (ref 70–99)
HCT VFR BLD CALC: 33.7 % — LOW (ref 34.5–45)
HGB BLD-MCNC: 11.2 G/DL — LOW (ref 11.5–15.5)
IMM GRANULOCYTES NFR BLD AUTO: 0.3 % — SIGNIFICANT CHANGE UP (ref 0–0.9)
LYMPHOCYTES # BLD AUTO: 1.34 K/UL — SIGNIFICANT CHANGE UP (ref 1–3.3)
LYMPHOCYTES # BLD AUTO: 34.1 % — SIGNIFICANT CHANGE UP (ref 13–44)
MCHC RBC-ENTMCNC: 28.9 PG — SIGNIFICANT CHANGE UP (ref 27–34)
MCHC RBC-ENTMCNC: 33.2 G/DL — SIGNIFICANT CHANGE UP (ref 32–36)
MCV RBC AUTO: 86.9 FL — SIGNIFICANT CHANGE UP (ref 80–100)
MONOCYTES # BLD AUTO: 0.34 K/UL — SIGNIFICANT CHANGE UP (ref 0–0.9)
MONOCYTES NFR BLD AUTO: 8.7 % — SIGNIFICANT CHANGE UP (ref 2–14)
NEUTROPHILS # BLD AUTO: 2.07 K/UL — SIGNIFICANT CHANGE UP (ref 1.8–7.4)
NEUTROPHILS NFR BLD AUTO: 52.5 % — SIGNIFICANT CHANGE UP (ref 43–77)
NRBC # BLD: 0 /100 WBCS — SIGNIFICANT CHANGE UP (ref 0–0)
PLATELET # BLD AUTO: 253 K/UL — SIGNIFICANT CHANGE UP (ref 150–400)
POTASSIUM SERPL-MCNC: 3.7 MMOL/L — SIGNIFICANT CHANGE UP (ref 3.5–5.3)
POTASSIUM SERPL-SCNC: 3.7 MMOL/L — SIGNIFICANT CHANGE UP (ref 3.5–5.3)
PROT SERPL-MCNC: 7 G/DL — SIGNIFICANT CHANGE UP (ref 6–8.3)
RBC # BLD: 3.88 M/UL — SIGNIFICANT CHANGE UP (ref 3.8–5.2)
RBC # FLD: 12.9 % — SIGNIFICANT CHANGE UP (ref 10.3–14.5)
SODIUM SERPL-SCNC: 134 MMOL/L — LOW (ref 135–145)
WBC # BLD: 3.93 K/UL — SIGNIFICANT CHANGE UP (ref 3.8–10.5)
WBC # FLD AUTO: 3.93 K/UL — SIGNIFICANT CHANGE UP (ref 3.8–10.5)

## 2022-11-09 PROCEDURE — 99214 OFFICE O/P EST MOD 30 MIN: CPT

## 2022-11-09 NOTE — PHYSICAL EXAM
[Normal] : affect appropriate [de-identified] : non-toxic appearing [de-identified] : right CW port site-no erythema, swelling or tenderness [de-identified] : b/l CW without nodularity [de-identified] : No gross focal motor deficits

## 2022-11-09 NOTE — ASSESSMENT
[FreeTextEntry1] : Lab work reviewed.\par 62 year old female with history of T2N0M0 (2.2cm), Stage IIA RIGHT breast triple negative invasive ductal carcinoma, s/p adjuvant TC chemotherapy and RT-2011.\par \par 4/25/2022 - S/P bilateral mastectomies with left axillary sentinel node biopsy on 4/25/22. Pathology revealed:\par 1) RIGHT Breast: focal ADH\par 2) LEFT Breast: Invasive poorly differentiated ductal carcinoma (1.8 cm), DCIS, 2 benign left axillary lymph nodes (one of which was a sentinel LN).\par  ER 0%; MS 0%; HER 2 + by FISH.\par Completed Taxol portion of treatment regimen 8/31/22.\par Clinically stable for Bhavesh today. Brain MRI was ordered in light of persistent H/A's-results pending.\par Last echocardiogram 8/2022-LVEF 66%. Cardiology f/u planned for next cardiac echo.\par \par H/O leukopenia-clinically secondary to prior chemotherapy. Today's WBC WNL. Continue to monitor CBC with differential.\par \par Patient was given the opportunity to ask questions.  Her questions have been answered to her apparent satisfaction at this time.  She expressed her understanding and willingness to comply with recommended follow-up.\par \par

## 2022-11-09 NOTE — HISTORY OF PRESENT ILLNESS
[Disease: _____________________] : Disease: [unfilled] [T: ___] : T[unfilled] [N: ___] : N[unfilled] [AJCC Stage: ____] : AJCC Stage: [unfilled] [de-identified] : 2011-T2N0M0 (2.2cm), Stage IIA RIGHT breast triple negative invasive ductal carcinoma with [place] score 8 to 9. S/P right lumpectomy (Dr. Keyon Amezquita)--> adjuvant chemotherapy with taxotere and cytoxan for 4 cycles--> IMRT with 5000cGy in 25 fractions followed by boost to the cavity of 1000cGy in 5 fractions (11/9/11-12/21/11 with Dr. Veena Walls). \par \par 12/28/21-Bilateral mammo/US showed left breast 2-3:00 4cmfn 1.4cm irregular mass suspicious of malignancy, US core biopsy recommended; grouped heterogenous calcifications at right breast 10:00 anterior depth also suspicious for malignancy, bx recommended, [id number]. \par \par 1/31/2022-S/P left breast 3:00 4cmfn US guided biopsy which showed invasive poorly differentiated ductal carcinoma, taylor score 8/9 (3+3+2), invasive tumor at least 0.8cm, microcalcifications present, no LVI. ER negative <1%, MT negative <1%, HER2 2+ IHC, FISH+. \par \par 2/14/2022-MRI breast-left breast 3:00 biopsy proven cancer associated with 1.4cm mass enhancement;adjacent contiguous large area of linerar nonmassenhancement seen extending from biopsy site into retroareolar/central/posterior left breast up to 9.4cm, 2 site MRI biopsy rec.; [id number].\par \par 3/1/2022-MRI biopsy of both areas showed fibrocystic changes including sclerosing adenosis \par \par 2/15/2022-CT C/A/P-showed multiple mildly enlarged mediastinal and hilar LNs, increased in size compard to7/10/2020. \par Same day bone scan negative for osseous metastasis. \par \par 3/2/2022-PET scan-showed known left breast cancer,adjacent 4.4cm hematoma, indeterminate FDG avid left supraclavicular, mediastinal and hilar LAD new and/or enlarged since 7/10/2020, mediastinal and hilar LNs symmetric distribution suggests a benign etiology;left supraclavicular LN access to US guided biopsy; subcm minimallyFDGavid left axillary LN nonspecific, may be biopsied. \par \par 3/15/2022-Left supraclavicular LN US guided FNA was negative for malignant cells. \par \par Saw Dr. Anmol Sanford pulmonary on 4/11/22. Followup after surgery to assess the mediastinal LNs-felt unlikely related to breast cancer. S/P Dr. Sanford - biopsy negative for malignancy; + granulomatous inflammation\par \par 4/25/2022 - S/P bilateral mastectomies with left axillary sentinel node biopsy on 4/25/22. Pathology revealed:\par 1) RIGHT Breast: focal ADH\par 2) LEFT Breast: Invasive poorly differentiated ductal carcinoma (1.8 cm), DCIS, 2 benign left axillary lymph nodes (one of which was a sentinel LN).\par  ER 0%; MT 0%; HER 2 + by FISH.\par \par 6/9/2022-Taxol/Kanjinti begun. Taxol completed 8/31/2022. Continues with Kanjinti.\par  [de-identified] : Invasive poorly differentiated ductal carcinoma [de-identified] : ER-/SC-/Her 2+ [de-identified] : 11/2019-My Risk genetic testing negative. BRCA 1/BRCA 2 negative.\par \par Patient under the oncology care of Dr. Abernathy through 7/2022.\par \par Sister leukemia dx'd age 40. Mother had multiple myeloma. Brother stomach cancer age 68. Daughter with CLL age 30. Paternal 2nd cousin breast cancer age 49. Paternal 2nd cousin liver cancer age 40 had hepatitis. Family history is negative for cancer of the ovary, endometrium, prostate, pancreatic and melanoma. \par The patient is not of Ashkenazi ethnic background. \par \par  [de-identified] : Has h/o H/A's ~2-3 x/week-pre-dated chemo. No change in vision reported.\par No fevers.\par No current pulmonary/GI//complaints.\par Sees cardiologist for echo monitoring-last 8/4/22-to call for next one.\par Seeing endocrinologist ([facility]), Dr.Halis Brewster-plans to start either Zometa or Prolia for her osteoporosis.\par Daughter Shobha present (is a nurse at [employer], not working currently), and assisted in providing history.\par \par Has had COVID vaccines (Pfizer).

## 2022-11-09 NOTE — REVIEW OF SYSTEMS
[Confused] : no confusion [Dizziness] : no dizziness [Fainting] : no fainting [Negative] : Musculoskeletal

## 2022-11-28 NOTE — ED ADULT NURSE NOTE - NS ED NOTE ABUSE SUSPICION NEGLECT YN
11/28/22 0747   Unmeasured Output   Stool Occurrence 1     Pt had 1 BM this morning. Soft and brown. Visually, there is no blood present.
No

## 2022-11-29 ENCOUNTER — APPOINTMENT (OUTPATIENT)
Dept: ULTRASOUND IMAGING | Facility: IMAGING CENTER | Age: 62
End: 2022-11-29

## 2022-11-29 ENCOUNTER — OUTPATIENT (OUTPATIENT)
Dept: OUTPATIENT SERVICES | Facility: HOSPITAL | Age: 62
LOS: 1 days | End: 2022-11-29
Payer: MEDICAID

## 2022-11-29 DIAGNOSIS — Z98.890 OTHER SPECIFIED POSTPROCEDURAL STATES: Chronic | ICD-10-CM

## 2022-11-29 DIAGNOSIS — Z90.710 ACQUIRED ABSENCE OF BOTH CERVIX AND UTERUS: Chronic | ICD-10-CM

## 2022-11-29 DIAGNOSIS — Z92.21 PERSONAL HISTORY OF ANTINEOPLASTIC CHEMOTHERAPY: Chronic | ICD-10-CM

## 2022-11-29 DIAGNOSIS — I87.2 VENOUS INSUFFICIENCY (CHRONIC) (PERIPHERAL): ICD-10-CM

## 2022-11-29 PROCEDURE — 93970 EXTREMITY STUDY: CPT

## 2022-11-29 PROCEDURE — 93970 EXTREMITY STUDY: CPT | Mod: 26

## 2022-11-30 ENCOUNTER — APPOINTMENT (OUTPATIENT)
Dept: INFUSION THERAPY | Facility: HOSPITAL | Age: 62
End: 2022-11-30

## 2022-11-30 ENCOUNTER — RESULT REVIEW (OUTPATIENT)
Age: 62
End: 2022-11-30

## 2022-11-30 LAB
ALBUMIN SERPL ELPH-MCNC: 4.4 G/DL — SIGNIFICANT CHANGE UP (ref 3.3–5)
ALP SERPL-CCNC: 99 U/L — SIGNIFICANT CHANGE UP (ref 40–120)
ALT FLD-CCNC: 19 U/L — SIGNIFICANT CHANGE UP (ref 10–45)
ANION GAP SERPL CALC-SCNC: 14 MMOL/L — SIGNIFICANT CHANGE UP (ref 5–17)
AST SERPL-CCNC: 16 U/L — SIGNIFICANT CHANGE UP (ref 10–40)
BASOPHILS # BLD AUTO: 0.04 K/UL — SIGNIFICANT CHANGE UP (ref 0–0.2)
BASOPHILS NFR BLD AUTO: 0.7 % — SIGNIFICANT CHANGE UP (ref 0–2)
BILIRUB SERPL-MCNC: <0.2 MG/DL — SIGNIFICANT CHANGE UP (ref 0.2–1.2)
BUN SERPL-MCNC: 15 MG/DL — SIGNIFICANT CHANGE UP (ref 7–23)
CALCIUM SERPL-MCNC: 9.4 MG/DL — SIGNIFICANT CHANGE UP (ref 8.4–10.5)
CHLORIDE SERPL-SCNC: 99 MMOL/L — SIGNIFICANT CHANGE UP (ref 96–108)
CO2 SERPL-SCNC: 23 MMOL/L — SIGNIFICANT CHANGE UP (ref 22–31)
CREAT SERPL-MCNC: 0.56 MG/DL — SIGNIFICANT CHANGE UP (ref 0.5–1.3)
EGFR: 103 ML/MIN/1.73M2 — SIGNIFICANT CHANGE UP
EOSINOPHIL # BLD AUTO: 0.19 K/UL — SIGNIFICANT CHANGE UP (ref 0–0.5)
EOSINOPHIL NFR BLD AUTO: 3.3 % — SIGNIFICANT CHANGE UP (ref 0–6)
GLUCOSE SERPL-MCNC: 138 MG/DL — HIGH (ref 70–99)
HCT VFR BLD CALC: 36.8 % — SIGNIFICANT CHANGE UP (ref 34.5–45)
HGB BLD-MCNC: 12.2 G/DL — SIGNIFICANT CHANGE UP (ref 11.5–15.5)
IMM GRANULOCYTES NFR BLD AUTO: 0.5 % — SIGNIFICANT CHANGE UP (ref 0–0.9)
LYMPHOCYTES # BLD AUTO: 2.1 K/UL — SIGNIFICANT CHANGE UP (ref 1–3.3)
LYMPHOCYTES # BLD AUTO: 36.3 % — SIGNIFICANT CHANGE UP (ref 13–44)
MCHC RBC-ENTMCNC: 27.9 PG — SIGNIFICANT CHANGE UP (ref 27–34)
MCHC RBC-ENTMCNC: 33.2 G/DL — SIGNIFICANT CHANGE UP (ref 32–36)
MCV RBC AUTO: 84.2 FL — SIGNIFICANT CHANGE UP (ref 80–100)
MONOCYTES # BLD AUTO: 0.59 K/UL — SIGNIFICANT CHANGE UP (ref 0–0.9)
MONOCYTES NFR BLD AUTO: 10.2 % — SIGNIFICANT CHANGE UP (ref 2–14)
NEUTROPHILS # BLD AUTO: 2.84 K/UL — SIGNIFICANT CHANGE UP (ref 1.8–7.4)
NEUTROPHILS NFR BLD AUTO: 49 % — SIGNIFICANT CHANGE UP (ref 43–77)
NRBC # BLD: 0 /100 WBCS — SIGNIFICANT CHANGE UP (ref 0–0)
PLATELET # BLD AUTO: 257 K/UL — SIGNIFICANT CHANGE UP (ref 150–400)
POTASSIUM SERPL-MCNC: 4.1 MMOL/L — SIGNIFICANT CHANGE UP (ref 3.5–5.3)
POTASSIUM SERPL-SCNC: 4.1 MMOL/L — SIGNIFICANT CHANGE UP (ref 3.5–5.3)
PROT SERPL-MCNC: 6.8 G/DL — SIGNIFICANT CHANGE UP (ref 6–8.3)
RBC # BLD: 4.37 M/UL — SIGNIFICANT CHANGE UP (ref 3.8–5.2)
RBC # FLD: 12.8 % — SIGNIFICANT CHANGE UP (ref 10.3–14.5)
SODIUM SERPL-SCNC: 135 MMOL/L — SIGNIFICANT CHANGE UP (ref 135–145)
WBC # BLD: 5.79 K/UL — SIGNIFICANT CHANGE UP (ref 3.8–10.5)
WBC # FLD AUTO: 5.79 K/UL — SIGNIFICANT CHANGE UP (ref 3.8–10.5)

## 2022-12-01 ENCOUNTER — APPOINTMENT (OUTPATIENT)
Dept: PULMONOLOGY | Facility: CLINIC | Age: 62
End: 2022-12-01

## 2022-12-01 VITALS
SYSTOLIC BLOOD PRESSURE: 120 MMHG | HEART RATE: 53 BPM | BODY MASS INDEX: 33.23 KG/M2 | TEMPERATURE: 97.2 F | HEIGHT: 61 IN | WEIGHT: 176 LBS | OXYGEN SATURATION: 98 % | RESPIRATION RATE: 16 BRPM | DIASTOLIC BLOOD PRESSURE: 80 MMHG

## 2022-12-01 PROCEDURE — 99214 OFFICE O/P EST MOD 30 MIN: CPT | Mod: 25

## 2022-12-01 PROCEDURE — 95012 NITRIC OXIDE EXP GAS DETER: CPT

## 2022-12-01 PROCEDURE — 94010 BREATHING CAPACITY TEST: CPT

## 2022-12-01 PROCEDURE — 94729 DIFFUSING CAPACITY: CPT

## 2022-12-01 RX ORDER — AZITHROMYCIN 250 MG/1
250 TABLET, FILM COATED ORAL
Qty: 6 | Refills: 0 | Status: DISCONTINUED | COMMUNITY
Start: 2022-04-03 | End: 2022-12-01

## 2022-12-01 NOTE — ASSESSMENT
[FreeTextEntry1] : Ms. LOYA is a 62 year old female originally [place] from with a history of non smoking, breast cancer originally on the right 2011, s/p lumpectomy with radiation and chemo, arthritis, HTN, ELVIN, newly diagnosed left sided breast tumor (intraductal carcinoma) 2022 presenting to the office today for f/p pulmonary evaluation for SOB, ?asthma, allergies, GERD, untreated ELVIN, abnormal CT of the chest (adenopathy) c/w sarcoidosis active reflux (still) - awaiting dental device (insurance issues)\par \par \par Her shortness of breath is multifactorial due to:\par -poor mechanics of breathing \par -out of shape / overweight\par -pulmonary disease\par    -mild asthma \par -?cardiac disease \par \par problem 1:mild asthma (quiet)\par -add Breo Ellipta 200 at 1 inhalation QD or Symbicort 160 2 inhalations BID \par -Add Symbicort 160 2 BID \par -Asthma is  believed to be caused by inherited (genetic) and environmental factor, but its exact cause is unknown. Asthma may be triggered by allergens, lung infections, or irritants in the air. Asthma triggers are different for each person \par -Inhaler technique reviewed as well as oral hygiene techniques reviewed with patient. Avoidance of cold air, extremes of temperature, rescue inhaler should be used before exercise. Order of medication reviewed with patient. Recommended use of a cool mist humidifier in the bedroom. \par \par problem 2:allergies/sinus \par -get Blood work to include: asthma panel, food IgE panel, IgE level, eosinophil level, vitamin D level (NC)\par -Environmental measures for allergies were encouraged including mattress and pillow covers, air purifier, and environmental controls. \par \par problem 3:GERD (active) (still)\par -Add Pepcid 40 mg QHS \par -Reglan 5 mg pre meal qHS \par -continue Omeprazole 40 mg QAM, pre-meal, dinner\par -Rule of 2s: avoid eating too much, eating too late, eating too spicy, eating two hours before bed.\par -Things to avoid including overeating, spicy foods, tight clothing, eating within three hours of bed, this list is not all inclusive. \par -For treatment of reflux, possible options discussed including diet control, H2 blockers, PPIs, as well as coating motility agents discussed as treatment options. Timing of meals and proximity of last meal to sleep were discussed. If symptoms persist, a formal gastrointestinal evaluation is needed. \par \par Problem 4:ELVIN (elevated Mallampati class) \par -recommended DD because she is intolerant of CPAP (Fan/Tobin)\par -s/p Home sleep study (AHI = 16, Lowest O2 saturation = 77)\par Sleep apnea is associated with adverse clinical consequences which can affect most organ systems.  Cardiovascular disease risk includes arrhythmias, atrial fibrillation, hypertension, coronary artery disease, and stroke. Metabolic disorders include diabetes type 2, non-alcoholic fatty liver disease. Mood disorder especially depression; and cognitive decline especially in the elderly. Associations with  chronic reflux/Louis’s esophagus some but not all inclusive. \par -Reasons  include arousal consistent with hypopnea; respiratory events most prominent in REM sleep or supine position; therefore sleep staging and body position are important for accurate diagnosis and estimation of AHI. \par \par Problem 5: Abnormal CT (adenopathy that dates back to 2020) (?sarcoidosis less likely lymphoma and even less likely breast cancer)\par -s/p ACE \par -s/p ESR \par -complete bronchoscopy with EBUS with Dr Herzog (will not interfere with surgery)  c/w sarcoidosis\par -s/p opthalmology f/u \par CAT scans are the only radiological modality to identify abnormalities w/in the lungs with regards to nodules/masses/lymph nodes. Risks, benefits were reviewed in detail. The guidelines for abnormalities include follow up CT scans at various intervals which could range from 6 weeks to 1 year intervals. If there is a change for the worse then consideration for a biopsy will be considered if you are a candidate. Second opinion evaluation with a thoracic surgeon or an interventional radiologist could be offered. \par \par Problem: Sarcoidosis \par -Sarcoidosis is a medical mystery and can present with very serious illness or little consequence. The disease can affect any organ including skin, liver, lymph glands, spleen, eyes, nervous system, musculoskeletal system, heart, brain, kidneys, and including the lungs. Pulmonary sarcoidosis can cause loss of lung volume and abnormal lung stiffness. This disease is characterized by presence of granulomas, small areas of inflamed cells. Sarcoidosis patients should have regular cardiac and eye examinations as well as regular PFTs. \par \par problem 6: cardiac disease\par -recommended to continue to follow up with Cardiologist if needed\par \par problem 7: poor breathing mechanics\par -Proper breathing techniques were reviewed with an emphasis of exhalation. Patient instructed to breath in for 1 second and out for four seconds. Patient was encouraged to not talk while walking. \par \par problem 8: out of shape / overweight\par -Recommended Devyn Walter book on 10 day Detox \par -Weight loss, exercise, and diet control were discussed and are highly encouraged. Treatment options were given such as, aqua therapy, and contacting a nutritionist. Recommended to use the elliptical, stationary bike, less use of treadmill. Mindful eating was explained to the patient Obesity is associated with worsening asthma, shortness of breath, and potential for cardiac disease, diabetes, and other underlying medical conditions\par \par problem 9: health maintenance \par -recommended yearly flu shot after October 15 2022 refused\par -recommended strep pneumonia vaccines: Prevnar-13 vaccine, followed by Pneumo vaccine 23 one year following after 65\par -recommended early intervention for Upper Respiratory Infections (URIs)\par -recommended regular osteoporosis evaluations\par -recommended early dermatological evaluations\par -recommended after the age of 50 to the age of 70, colonoscopy every 5 years\par \par F/U in 6-8 weeks.\par She is encouraged to call with any changes, concerns, or questions\par

## 2022-12-01 NOTE — HISTORY OF PRESENT ILLNESS
[TextBox_4] : Ms. LOYA is a 62 year old female originally [place] from with a history of non smoking, breast cancer originally on the right 2011, s/p lumpectomy with radiation and chemo, arthritis, HTN, ELVIN, newly diagnosed left sided breast tumor (intraductal carcinoma) 2022 presenting to the office today for f/p pulmonary evaluation. Her chief complaint is\par \par -she notes not feeling as well as she could\par -she notes that she is not sleeping well\par -she notes a dental device was not covered by insurance and she is unable to pay out of pocket\par -her daughter notes her memory and concentration are worse lately \par -she notes severe heartburn daily\par -she notes taking omeprazole (40 mg) \par -she notes she is breathing normally\par -she notes her breathing is worse in the cold air\par -she notes taking Reglan before each meal (5 mg)\par -she notes no new lumps or bumps\par -she notes that she has not taken the pneumonia shots\par -s/p covid 19 vaccine x3\par -\par \par -patient denies any headaches, nausea, vomiting, fever, chills, sweats, chest pain, chest pressure, palpitations, coughing, wheezing, fatigue, diarrhea, constipation, dysphagia, myalgias, dizziness, leg swelling, leg pain, itchy eyes, itchy ears

## 2022-12-01 NOTE — PROCEDURE
[FreeTextEntry1] : Sleep study (9/23/2022) revealed moderate sleep apnea with an AHI/NANCY of 16 and a low oxygen saturation of 77% \par \par Full PFT revealed normal flows, with a FEV1 of 2.21L, which is 103% of predicted, normal lung volumes, and a diffusion of 16.6, which is 82% of predicted, with a normal flow volume loop\par \par Feno was 72; a normal value being less than 25. Fractional exhaled nitric oxide (FENO) is regarded as a simple, noninvasive method for assessing eosinophilic airway inflammation. Produced by a variety of cells within the lung, nitric oxide (NO) concentrations are generally low in healthy individuals. However, high concentrations of NO appear to be involved in nonspecific host defense mechanisms and chronic inflammatory  diseases such as asthma. The American Thoracic Society (ATS) therefore recommended using FENO to aid in the diagnosis and monitoring of eosinophilic airway inflammation and asthma, and for identifying steroid responsive individuals whose chronic respiratory symptoms may be caused by airway inflammation

## 2022-12-01 NOTE — ADDENDUM
[FreeTextEntry1] : Documented by Kaiser Mcintyre acting as a scribe for Dr. Justice Mcdonald on 12/01/2022.\par \par All medical record entries made by the Scribe were at my, Dr. Justice Mcdonald's, direction and personally dictated by me on 12/01/2022. I have reviewed the chart and agree that the record accurately reflects my personal performance of the history, physical exam, assessment and plan. I have also personally directed, reviewed, and agree with the discharge instructions.

## 2022-12-04 ENCOUNTER — APPOINTMENT (OUTPATIENT)
Dept: MRI IMAGING | Facility: IMAGING CENTER | Age: 62
End: 2022-12-04

## 2022-12-04 ENCOUNTER — OUTPATIENT (OUTPATIENT)
Dept: OUTPATIENT SERVICES | Facility: HOSPITAL | Age: 62
LOS: 1 days | End: 2022-12-04
Payer: MEDICAID

## 2022-12-04 DIAGNOSIS — Z90.710 ACQUIRED ABSENCE OF BOTH CERVIX AND UTERUS: Chronic | ICD-10-CM

## 2022-12-04 DIAGNOSIS — C50.919 MALIGNANT NEOPLASM OF UNSPECIFIED SITE OF UNSPECIFIED FEMALE BREAST: ICD-10-CM

## 2022-12-04 DIAGNOSIS — Z92.21 PERSONAL HISTORY OF ANTINEOPLASTIC CHEMOTHERAPY: Chronic | ICD-10-CM

## 2022-12-04 DIAGNOSIS — Z98.890 OTHER SPECIFIED POSTPROCEDURAL STATES: Chronic | ICD-10-CM

## 2022-12-04 PROCEDURE — A9585: CPT

## 2022-12-04 PROCEDURE — 70553 MRI BRAIN STEM W/O & W/DYE: CPT

## 2022-12-04 PROCEDURE — 70553 MRI BRAIN STEM W/O & W/DYE: CPT | Mod: 26,76

## 2022-12-05 ENCOUNTER — NON-APPOINTMENT (OUTPATIENT)
Age: 62
End: 2022-12-05

## 2022-12-07 ENCOUNTER — APPOINTMENT (OUTPATIENT)
Dept: CV DIAGNOSITCS | Facility: HOSPITAL | Age: 62
End: 2022-12-07

## 2022-12-14 ENCOUNTER — OUTPATIENT (OUTPATIENT)
Dept: OUTPATIENT SERVICES | Facility: HOSPITAL | Age: 62
LOS: 1 days | Discharge: HOME | End: 2022-12-14

## 2022-12-14 DIAGNOSIS — Z90.710 ACQUIRED ABSENCE OF BOTH CERVIX AND UTERUS: Chronic | ICD-10-CM

## 2022-12-14 DIAGNOSIS — Z98.890 OTHER SPECIFIED POSTPROCEDURAL STATES: Chronic | ICD-10-CM

## 2022-12-14 DIAGNOSIS — C50.919 MALIGNANT NEOPLASM OF UNSPECIFIED SITE OF UNSPECIFIED FEMALE BREAST: ICD-10-CM

## 2022-12-14 DIAGNOSIS — Z92.21 PERSONAL HISTORY OF ANTINEOPLASTIC CHEMOTHERAPY: Chronic | ICD-10-CM

## 2022-12-21 ENCOUNTER — APPOINTMENT (OUTPATIENT)
Dept: HEMATOLOGY ONCOLOGY | Facility: CLINIC | Age: 62
End: 2022-12-21

## 2022-12-21 ENCOUNTER — RESULT REVIEW (OUTPATIENT)
Age: 62
End: 2022-12-21

## 2022-12-21 ENCOUNTER — APPOINTMENT (OUTPATIENT)
Dept: INFUSION THERAPY | Facility: HOSPITAL | Age: 62
End: 2022-12-21

## 2022-12-21 VITALS
DIASTOLIC BLOOD PRESSURE: 73 MMHG | TEMPERATURE: 98 F | RESPIRATION RATE: 16 BRPM | SYSTOLIC BLOOD PRESSURE: 134 MMHG | OXYGEN SATURATION: 99 % | HEART RATE: 68 BPM

## 2022-12-21 LAB
ALBUMIN SERPL ELPH-MCNC: 4.4 G/DL — SIGNIFICANT CHANGE UP (ref 3.3–5)
ALP SERPL-CCNC: 90 U/L — SIGNIFICANT CHANGE UP (ref 40–120)
ALT FLD-CCNC: 16 U/L — SIGNIFICANT CHANGE UP (ref 10–45)
ANION GAP SERPL CALC-SCNC: 12 MMOL/L — SIGNIFICANT CHANGE UP (ref 5–17)
AST SERPL-CCNC: 15 U/L — SIGNIFICANT CHANGE UP (ref 10–40)
BASOPHILS # BLD AUTO: 0.03 K/UL — SIGNIFICANT CHANGE UP (ref 0–0.2)
BASOPHILS NFR BLD AUTO: 0.5 % — SIGNIFICANT CHANGE UP (ref 0–2)
BILIRUB SERPL-MCNC: 0.2 MG/DL — SIGNIFICANT CHANGE UP (ref 0.2–1.2)
BUN SERPL-MCNC: 14 MG/DL — SIGNIFICANT CHANGE UP (ref 7–23)
CALCIUM SERPL-MCNC: 9.5 MG/DL — SIGNIFICANT CHANGE UP (ref 8.4–10.5)
CHLORIDE SERPL-SCNC: 98 MMOL/L — SIGNIFICANT CHANGE UP (ref 96–108)
CO2 SERPL-SCNC: 24 MMOL/L — SIGNIFICANT CHANGE UP (ref 22–31)
CREAT SERPL-MCNC: 0.56 MG/DL — SIGNIFICANT CHANGE UP (ref 0.5–1.3)
EGFR: 103 ML/MIN/1.73M2 — SIGNIFICANT CHANGE UP
EOSINOPHIL # BLD AUTO: 0.28 K/UL — SIGNIFICANT CHANGE UP (ref 0–0.5)
EOSINOPHIL NFR BLD AUTO: 4.8 % — SIGNIFICANT CHANGE UP (ref 0–6)
GLUCOSE SERPL-MCNC: 157 MG/DL — HIGH (ref 70–99)
HCT VFR BLD CALC: 35.7 % — SIGNIFICANT CHANGE UP (ref 34.5–45)
HGB BLD-MCNC: 11.8 G/DL — SIGNIFICANT CHANGE UP (ref 11.5–15.5)
IMM GRANULOCYTES NFR BLD AUTO: 0.3 % — SIGNIFICANT CHANGE UP (ref 0–0.9)
LYMPHOCYTES # BLD AUTO: 1.71 K/UL — SIGNIFICANT CHANGE UP (ref 1–3.3)
LYMPHOCYTES # BLD AUTO: 29.3 % — SIGNIFICANT CHANGE UP (ref 13–44)
MCHC RBC-ENTMCNC: 27.6 PG — SIGNIFICANT CHANGE UP (ref 27–34)
MCHC RBC-ENTMCNC: 33.1 G/DL — SIGNIFICANT CHANGE UP (ref 32–36)
MCV RBC AUTO: 83.4 FL — SIGNIFICANT CHANGE UP (ref 80–100)
MONOCYTES # BLD AUTO: 0.48 K/UL — SIGNIFICANT CHANGE UP (ref 0–0.9)
MONOCYTES NFR BLD AUTO: 8.2 % — SIGNIFICANT CHANGE UP (ref 2–14)
NEUTROPHILS # BLD AUTO: 3.32 K/UL — SIGNIFICANT CHANGE UP (ref 1.8–7.4)
NEUTROPHILS NFR BLD AUTO: 56.9 % — SIGNIFICANT CHANGE UP (ref 43–77)
NRBC # BLD: 0 /100 WBCS — SIGNIFICANT CHANGE UP (ref 0–0)
PLATELET # BLD AUTO: 261 K/UL — SIGNIFICANT CHANGE UP (ref 150–400)
POTASSIUM SERPL-MCNC: 4 MMOL/L — SIGNIFICANT CHANGE UP (ref 3.5–5.3)
POTASSIUM SERPL-SCNC: 4 MMOL/L — SIGNIFICANT CHANGE UP (ref 3.5–5.3)
PROT SERPL-MCNC: 7.1 G/DL — SIGNIFICANT CHANGE UP (ref 6–8.3)
RBC # BLD: 4.28 M/UL — SIGNIFICANT CHANGE UP (ref 3.8–5.2)
RBC # FLD: 13.4 % — SIGNIFICANT CHANGE UP (ref 10.3–14.5)
SODIUM SERPL-SCNC: 134 MMOL/L — LOW (ref 135–145)
WBC # BLD: 5.84 K/UL — SIGNIFICANT CHANGE UP (ref 3.8–10.5)
WBC # FLD AUTO: 5.84 K/UL — SIGNIFICANT CHANGE UP (ref 3.8–10.5)

## 2022-12-21 PROCEDURE — 99214 OFFICE O/P EST MOD 30 MIN: CPT

## 2022-12-21 NOTE — REVIEW OF SYSTEMS
[Negative] : Allergic/Immunologic [Confused] : no confusion [Dizziness] : no dizziness [Fainting] : no fainting

## 2022-12-21 NOTE — HISTORY OF PRESENT ILLNESS
[Disease: _____________________] : Disease: [unfilled] [T: ___] : T[unfilled] [N: ___] : N[unfilled] [AJCC Stage: ____] : AJCC Stage: [unfilled] [de-identified] : 2011-T2N0M0 (2.2cm), Stage IIA RIGHT breast triple negative invasive ductal carcinoma with [place] score 8 to 9. S/P right lumpectomy (Dr. Keyon Amezquita)--> adjuvant chemotherapy with taxotere and cytoxan for 4 cycles--> IMRT with 5000cGy in 25 fractions followed by boost to the cavity of 1000cGy in 5 fractions (11/9/11-12/21/11 with Dr. Veena Walls). \par \par 12/28/21-Bilateral mammo/US showed left breast 2-3:00 4cmfn 1.4cm irregular mass suspicious of malignancy, US core biopsy recommended; grouped heterogenous calcifications at right breast 10:00 anterior depth also suspicious for malignancy, bx recommended, BIRADS4. \par \par 1/31/2022-S/P left breast 3:00 4cmfn US guided biopsy which showed invasive poorly differentiated ductal carcinoma, taylor score 8/9 (3+3+2), invasive tumor at least 0.8cm, microcalcifications present, no LVI. ER negative <1%, DC negative <1%, HER2 2+ IHC, FISH+. \par \par 2/14/2022-MRI breast-left breast 3:00 biopsy proven cancer associated with 1.4cm mass enhancement;adjacent contiguous large area of linerar nonmassenhancement seen extending from biopsy site into retroareolar/central/posterior left breast up to 9.4cm, 2 site MRI biopsy rec.; [id number].\par \par 3/1/2022-MRI biopsy of both areas showed fibrocystic changes including sclerosing adenosis \par \par 2/15/2022-CT C/A/P-showed multiple mildly enlarged mediastinal and hilar LNs, increased in size compard to7/10/2020. \par Same day bone scan negative for osseous metastasis. \par \par 3/2/2022-PET scan-showed known left breast cancer,adjacent 4.4cm hematoma, indeterminate FDG avid left supraclavicular, mediastinal and hilar LAD new and/or enlarged since 7/10/2020, mediastinal and hilar LNs symmetric distribution suggests a benign etiology;left supraclavicular LN access to US guided biopsy; subcm minimallyFDGavid left axillary LN nonspecific, may be biopsied. \par \par 3/15/2022-Left supraclavicular LN US guided FNA was negative for malignant cells. \par \par Saw Dr. Anmol Sanford pulmonary on 4/11/22. Followup after surgery to assess the mediastinal LNs-felt unlikely related to breast cancer. S/P Dr. Sanford - biopsy negative for malignancy; + granulomatous inflammation\par \par 4/25/2022 - S/P bilateral mastectomies with left axillary sentinel node biopsy on 4/25/22. Pathology revealed:\par 1) RIGHT Breast: focal ADH\par 2) LEFT Breast: Invasive poorly differentiated ductal carcinoma (1.8 cm), DCIS, 2 benign left axillary lymph nodes (one of which was a sentinel LN).\par  ER 0%; DC 0%; HER 2 + by FISH.\par \par 6/9/2022-Taxol/Kanjinti begun. Taxol completed 8/31/2022. Continues with Kanjinti.\par  [de-identified] : Invasive poorly differentiated ductal carcinoma [de-identified] : ER-/W[place]-/Her 2+ [de-identified] : 11/2019-My Risk genetic testing negative. BRCA 1/BRCA 2 negative.\par \par Patient under the oncology care of Dr. Abernathy through 7/2022.\par \par Sister leukemia dx'd age 40. Mother had multiple myeloma. Brother stomach cancer age 68. Daughter with CLL age 30. Paternal 2nd cousin breast cancer age 49. Paternal 2nd cousin liver cancer age 40 had hepatitis. Family history is negative for cancer of the ovary, endometrium, prostate, pancreatic and melanoma. \par The patient is not of Ashkenazi ethnic background. \par \par  [de-identified] : No fevers.\par No current pulmonary complaints. Has some right sided discomfort-no N/V/D. No hematuria, dysuria.\par Sees cardiologist for echo monitoring-last 8/4/22.\par \par Seeing endocrinologist ([facility]), Dr.Halis Brewster-plans to start either Zometa or Prolia for her osteoporosis.\par Daughter Shobha present (is a nurse at [employer], not working currently), and assisted in providing history.\par \par Has had COVID vaccines ([employer]).

## 2022-12-21 NOTE — PHYSICAL EXAM
[Normal] : affect appropriate [de-identified] : non-toxic appearing [de-identified] : right CW port site-no erythema, swelling or tenderness [de-identified] : b/l CW without nodularity [de-identified] : No gross focal motor deficits

## 2022-12-21 NOTE — ASSESSMENT
[FreeTextEntry1] : Lab work, brain MRI report reviewed.\par #1) 62 year old female with history of T2N0M0 (2.2cm), Stage IIA RIGHT breast triple negative invasive ductal carcinoma, s/p adjuvant TC chemotherapy and RT-2011.\par \par 4/25/2022 - S/P bilateral mastectomies with left axillary sentinel node biopsy on 4/25/22. Pathology revealed:\par 1) RIGHT Breast: focal ADH\par 2) LEFT Breast: Invasive poorly differentiated ductal carcinoma (1.8 cm), DCIS, 2 benign left axillary lymph nodes (one of which was a sentinel LN).\par  ER 0%; OH 0%; HER 2 + by FISH.\par Completed Taxol portion of treatment regimen 8/31/22.\par [facility], to which patient consents.\par Last echocardiogram 8/2022-LVEF 66%. Cardiology f/u planned for next cardiac echo.\par \par #2) H/O leukopenia-clinically secondary to prior chemotherapy. Continue to monitor CBC with differential.\par \par #3) right sided discomfort-?etiology (NT on exam today)-will obtain abdominal US.\par \par Patient was given the opportunity to ask questions.  Her questions have been answered to her apparent satisfaction at this time.  She expressed her understanding and willingness to comply with recommended follow-up.\par \par

## 2022-12-22 ENCOUNTER — APPOINTMENT (OUTPATIENT)
Dept: CV DIAGNOSITCS | Facility: HOSPITAL | Age: 62
End: 2022-12-22

## 2022-12-22 DIAGNOSIS — R11.2 NAUSEA WITH VOMITING, UNSPECIFIED: ICD-10-CM

## 2022-12-22 DIAGNOSIS — Z51.11 ENCOUNTER FOR ANTINEOPLASTIC CHEMOTHERAPY: ICD-10-CM

## 2022-12-28 ENCOUNTER — OUTPATIENT (OUTPATIENT)
Dept: OUTPATIENT SERVICES | Facility: HOSPITAL | Age: 62
LOS: 1 days | End: 2022-12-28
Payer: MEDICAID

## 2022-12-28 ENCOUNTER — APPOINTMENT (OUTPATIENT)
Dept: CV DIAGNOSITCS | Facility: HOSPITAL | Age: 62
End: 2022-12-28

## 2022-12-28 DIAGNOSIS — C50.919 MALIGNANT NEOPLASM OF UNSPECIFIED SITE OF UNSPECIFIED FEMALE BREAST: ICD-10-CM

## 2022-12-28 DIAGNOSIS — Z79.899 OTHER LONG TERM (CURRENT) DRUG THERAPY: ICD-10-CM

## 2022-12-28 DIAGNOSIS — Z92.21 PERSONAL HISTORY OF ANTINEOPLASTIC CHEMOTHERAPY: Chronic | ICD-10-CM

## 2022-12-28 DIAGNOSIS — Z90.710 ACQUIRED ABSENCE OF BOTH CERVIX AND UTERUS: Chronic | ICD-10-CM

## 2022-12-28 DIAGNOSIS — Z98.890 OTHER SPECIFIED POSTPROCEDURAL STATES: Chronic | ICD-10-CM

## 2022-12-28 PROCEDURE — 93306 TTE W/DOPPLER COMPLETE: CPT | Mod: 26

## 2023-01-01 NOTE — ED ADULT NURSE NOTE - CAS DISCH CONDITION
[facility]   Intensive Care Note    Name: (Male-Nancy Beltrán)        MRN [id number]  Parents: Nancy Beltrán  YOB: 2023 9:39 AM  Date of Admission: 2023  ____    History of Present Illness   Term, Gestational Age: 37w0d, small for gestational age, 5 lb 4.3 oz (2390 g), male infant born by , Low Transverse due to decelerations.  Asked by Luci Smith CNP to care for this infant born at [facility] .     The infant was admitted to the NICU for further evaluation, monitoring and management of poor feeding, concern for OMARI.    Patient Active Problem List   Diagnosis        SGA (small for gestational age), 2,000-2,499 grams     abstinence symptoms (H28)         Interval History   Missy scores 6-10.    Assessment & Plan     Overall Status:    10 day old, infant, now at 37w1d PMA.     This patient (whose weight is < 5000 grams) is not critically ill, but requires cardiac/respiratory monitoring, vital sign monitoring, temperature maintenance, enteral feeding adjustments, lab and/or oxygen monitoring and continuous assessment by the health care team under direct physician supervision.    Vascular Access:  None    SGA/IUGR  Symmetric, unknown as etiology. Additional evaluation indicated, including:  - uCMV - negative  - HUS - normal     FEN:    Vitals:    10/05/23 0000 10/06/23 0310 10/07/23 0130   Weight: 2.5 kg (5 lb 8.2 oz) 2.42 kg (5 lb 5.4 oz) 2.45 kg (5 lb 6.4 oz)     At risk for poor feeding due to possible withdrawal and poor feeding coordination.   Glucoses levels acceptable.     - PO ALD Similac Total Comfort Mother plans to formula feed. (Not a candidate for maternal breastmilk due to substance use; has not been discussed with mother as is not currently relevant but would need to discuss with mom if she expresses interest in breastfeeding in the future).   - Vit D  - Glycerin PRN  - Monitor weight trajectory      Respiratory:  No distress in RA.  - Routine CR monitoring with oximetry.    Cardiovascular:    Stable - good perfusion and BP.  No murmur present.  - Obtain CCHD screen, per protocol.   - Routine CR monitoring.    ID:    Low potential for sepsis  - Monitor clinically   IP Surveillance:  - routine IP surveillance test for MRSA  - Bacterial conjunctivitis (gram + cocci) Polytrim gtt 10/2 - 10/8     Jaundice: Resolved.  At risk for hyperbilirubinemia due to poor feeding.  Maternal blood type O+; baby blood type A positive.   Lab Results   Component Value Date    BILITOTAL 3.6 2023    BILITOTAL 2023    DBIL 0.31 (H) 2023    DBIL 0.31 (H) 2023        CNS/TOX:  NOWS: Mother reported use of unprescribed opiates during pregnancy (see communication note from  for details). Infant with significant withdrawal symptoms starting DOL 1. Infant urine tox positive for fentanyl and opiates (mother received fentanyl and dilaudid during admission). Cord tox screen positive for fentanyl. Missy scores 6-10 over past 24 hrs.  Prn X1 in past 24 hours.    - OMARI scoring and assessment per protocol.   - scheduled morphine 0.1mg Q8H + PRN     -Last wean 10/6   -  Will need to go 72 hours without any morphine prior to discharge  - Social Work Consult    HCM and Discharge Planning:  - Screening tests indicated PTD  [place]  - CCHD screen passed  - Hearing screen at/after 35wk GA  - OT input.  - wants circ, checking on insurance  - Continue standard NICU cares and family education plan.      Immunizations   Up to date (Hep B given at [facility])    Medications   Current Facility-Administered Medications   Medication    cholecalciferol (D-VI-SOL, Vitamin D3) 10 mcg/mL (400 units/mL) liquid 5 mcg    glycerin (PEDI-LAX) Suppository 0.25 suppository    hepatitis B vaccine previously administered    morphine solution 0.1 mg    morphine solution 0.1 mg    sucrose (SWEET-EASE)  solution 0.2-2 mL    trimethoprim-polymyxin b (POLYTRIM) ophthalmic solution 1 drop        Physical Exam   GEN: NAD, appearance appropriate for GA  RESP: Non-labored breathing, LCTAB  CV: RRR, no murmur.   ABD: Soft, non-tender, not distended. +BS  EXT: No deformity, MAEE  NEURO: Mild hypertonia (improved), calm throughout exam.     Communications   Parents:  Name Home Phone Work Phone Mobile Phone Relationship Lgl Grd   RAND WALKER [phone] Mother    Mother updated daily on/after rounds.       PCPs:   Infant PCP: [facility]  Maternal OB PCP:   Information for the patient's mother:  Rand Walker [[id number]]   Alfonzo Modi        Delivering Provider:   Haven Law MD   Admission note routed to all. Mother requested that no additional updates are sent to the referring facility.    Stable

## 2023-01-07 NOTE — ED PROVIDER NOTE - NSCAREINITIATED _GEN_ER
Ashli Melo(Attending)
please provide appt for pt this for right inguinal hernia repair this week with Dr. Tae Deleon

## 2023-01-10 ENCOUNTER — APPOINTMENT (OUTPATIENT)
Dept: SURGICAL ONCOLOGY | Facility: CLINIC | Age: 63
End: 2023-01-10
Payer: MEDICAID

## 2023-01-10 VITALS
SYSTOLIC BLOOD PRESSURE: 144 MMHG | HEIGHT: 61 IN | BODY MASS INDEX: 28.32 KG/M2 | DIASTOLIC BLOOD PRESSURE: 80 MMHG | WEIGHT: 150 LBS | HEART RATE: 54 BPM

## 2023-01-10 VITALS — OXYGEN SATURATION: 98 % | HEART RATE: 52 BPM

## 2023-01-10 PROCEDURE — 99214 OFFICE O/P EST MOD 30 MIN: CPT

## 2023-01-10 NOTE — REASON FOR VISIT
[Follow-Up Visit] : a follow-up visit for [Breast Cancer] : breast cancer [FreeTextEntry2] : s/p b/l mastectomies w/left axillary sentinel node biopsy 4/25/22

## 2023-01-10 NOTE — HISTORY OF PRESENT ILLNESS
[de-identified] : ARI LOYA  is a 62 year old female here for a follow-up visit, s/p Bilateral mastectomies with left axillary sentinel node biopsy on 4/25/22.\par \par In 2011, pt was diagnosed with T2N0M0 (2.2cm), stage IIA RIGHT breast triple negative invasive ductal carcinoma with Alexx score 8 to 9.  She underwent lumpectomy Dr. Keyon Amezquita and then received adjuvant chemotherapy with taxotere and cytoxan for 4 cycles.  She then received IMRT with 5000cGy in 25 fractions followed by boost to the cavity of 1000cGy in 5 fractions 11/9/11-12/21/11 with Dr. Veena Walls. \par \par Pt underwent left breast 3:00 4cmfn US guided biopsy on 1/31/22 which showed invasive poorly differentiated ductal carcinoma, Keavy score 8/9 (3+3+2), invasive tumor at least 0.8cm, micro calcifications present, no LVI.  ER-/DC-, HER2 equivocal pending CISH. \par \par MRI show an extensive area of enhancement area the biopsy site, CT (CAP) shows mediastinal adenopathy. Subsequent biopsies of the breast and the supraclavicular lymph node were negative. BRCA: no significant mutation. Plan for bilateral mastectomies & axillary sentinel node biopsy\par \par She is s/p bilateral mastectomies with left axillary sentinel node biopsy on 4/25/22.  Pathology revealed:\par 1) RIGHT Breast: focal ADH\par 2) LEFT Breast: Invasive poorly differentiated ductal carcinoma (1.8 cm), DCIS, 2 benign left axillary lymph nodes (one of which was a sentinel node), negative margins.  T1cN0, ER-DC-HER2 equivocal (POSITIVE by in situ hybridization).\par \par Post-op had left breast hematoma that was drained in office and was noted to be resorbing when seen by my colleague, Dr Fatima. \par Hematoma re-absorbing\par cellulitis on left chest wall 7/25/22 improving, gave Duricef 500 mg BID \par \par she is presently on Herceoptin q 3 weeks\par \par

## 2023-01-10 NOTE — PHYSICAL EXAM
[Normal] : supple, no neck mass and thyroid not enlarged [Normal Supraclavicular Lymph Nodes] : normal supraclavicular lymph nodes [Normal Axillary Lymph Nodes] : normal axillary lymph nodes [Normal] : oriented to person, place and time, with appropriate affect [de-identified] : S/P bilateral mastectomies without local recurrence

## 2023-01-10 NOTE — ASSESSMENT
[FreeTextEntry1] : IMP: \par \par She is s/p Bilateral mastectomies with left axillary sentinel node biopsy on 4/25/22.  \par Pathology revealed:\par 1) RIGHT Breast: focal ADH\par 2) LEFT Breast: Invasive poorly differentiated ductal carcinoma (1.8 cm), DCIS, 2 benign left axillary lymph nodes (one of which was a sentinel node), negative margins.  T1cN0, ER-FL-HER2 equivocal (POSITIVE by in situ hybridization).\par \par Adjuvant chemotherapy w/ Dr. Linnette Ortega\par Herceptin continues\par \par PLAN:\par RTO Q3 months\par

## 2023-01-11 ENCOUNTER — RESULT REVIEW (OUTPATIENT)
Age: 63
End: 2023-01-11

## 2023-01-11 ENCOUNTER — RX RENEWAL (OUTPATIENT)
Age: 63
End: 2023-01-11

## 2023-01-11 ENCOUNTER — APPOINTMENT (OUTPATIENT)
Dept: HEMATOLOGY ONCOLOGY | Facility: CLINIC | Age: 63
End: 2023-01-11
Payer: MEDICAID

## 2023-01-11 ENCOUNTER — APPOINTMENT (OUTPATIENT)
Dept: INFUSION THERAPY | Facility: HOSPITAL | Age: 63
End: 2023-01-11

## 2023-01-11 VITALS
TEMPERATURE: 98 F | HEART RATE: 51 BPM | DIASTOLIC BLOOD PRESSURE: 74 MMHG | OXYGEN SATURATION: 98 % | RESPIRATION RATE: 16 BRPM | SYSTOLIC BLOOD PRESSURE: 142 MMHG

## 2023-01-11 LAB
ALBUMIN SERPL ELPH-MCNC: 4.4 G/DL — SIGNIFICANT CHANGE UP (ref 3.3–5)
ALP SERPL-CCNC: 92 U/L — SIGNIFICANT CHANGE UP (ref 40–120)
ALT FLD-CCNC: 18 U/L — SIGNIFICANT CHANGE UP (ref 10–45)
ANION GAP SERPL CALC-SCNC: 10 MMOL/L — SIGNIFICANT CHANGE UP (ref 5–17)
AST SERPL-CCNC: 14 U/L — SIGNIFICANT CHANGE UP (ref 10–40)
BASOPHILS # BLD AUTO: 0.02 K/UL — SIGNIFICANT CHANGE UP (ref 0–0.2)
BASOPHILS NFR BLD AUTO: 0.4 % — SIGNIFICANT CHANGE UP (ref 0–2)
BILIRUB SERPL-MCNC: 0.2 MG/DL — SIGNIFICANT CHANGE UP (ref 0.2–1.2)
BUN SERPL-MCNC: 15 MG/DL — SIGNIFICANT CHANGE UP (ref 7–23)
CALCIUM SERPL-MCNC: 9 MG/DL — SIGNIFICANT CHANGE UP (ref 8.4–10.5)
CHLORIDE SERPL-SCNC: 98 MMOL/L — SIGNIFICANT CHANGE UP (ref 96–108)
CO2 SERPL-SCNC: 26 MMOL/L — SIGNIFICANT CHANGE UP (ref 22–31)
CREAT SERPL-MCNC: 0.6 MG/DL — SIGNIFICANT CHANGE UP (ref 0.5–1.3)
EGFR: 101 ML/MIN/1.73M2 — SIGNIFICANT CHANGE UP
EOSINOPHIL # BLD AUTO: 0.19 K/UL — SIGNIFICANT CHANGE UP (ref 0–0.5)
EOSINOPHIL NFR BLD AUTO: 3.9 % — SIGNIFICANT CHANGE UP (ref 0–6)
GLUCOSE SERPL-MCNC: 114 MG/DL — HIGH (ref 70–99)
HCT VFR BLD CALC: 33.1 % — LOW (ref 34.5–45)
HGB BLD-MCNC: 11 G/DL — LOW (ref 11.5–15.5)
IMM GRANULOCYTES NFR BLD AUTO: 0.2 % — SIGNIFICANT CHANGE UP (ref 0–0.9)
LYMPHOCYTES # BLD AUTO: 1.79 K/UL — SIGNIFICANT CHANGE UP (ref 1–3.3)
LYMPHOCYTES # BLD AUTO: 37 % — SIGNIFICANT CHANGE UP (ref 13–44)
MCHC RBC-ENTMCNC: 27.5 PG — SIGNIFICANT CHANGE UP (ref 27–34)
MCHC RBC-ENTMCNC: 33.2 G/DL — SIGNIFICANT CHANGE UP (ref 32–36)
MCV RBC AUTO: 82.8 FL — SIGNIFICANT CHANGE UP (ref 80–100)
MONOCYTES # BLD AUTO: 0.52 K/UL — SIGNIFICANT CHANGE UP (ref 0–0.9)
MONOCYTES NFR BLD AUTO: 10.7 % — SIGNIFICANT CHANGE UP (ref 2–14)
NEUTROPHILS # BLD AUTO: 2.31 K/UL — SIGNIFICANT CHANGE UP (ref 1.8–7.4)
NEUTROPHILS NFR BLD AUTO: 47.8 % — SIGNIFICANT CHANGE UP (ref 43–77)
NRBC # BLD: 0 /100 WBCS — SIGNIFICANT CHANGE UP (ref 0–0)
PLATELET # BLD AUTO: 246 K/UL — SIGNIFICANT CHANGE UP (ref 150–400)
POTASSIUM SERPL-MCNC: 3.9 MMOL/L — SIGNIFICANT CHANGE UP (ref 3.5–5.3)
POTASSIUM SERPL-SCNC: 3.9 MMOL/L — SIGNIFICANT CHANGE UP (ref 3.5–5.3)
PROT SERPL-MCNC: 6.7 G/DL — SIGNIFICANT CHANGE UP (ref 6–8.3)
RBC # BLD: 4 M/UL — SIGNIFICANT CHANGE UP (ref 3.8–5.2)
RBC # FLD: 14 % — SIGNIFICANT CHANGE UP (ref 10.3–14.5)
SODIUM SERPL-SCNC: 133 MMOL/L — LOW (ref 135–145)
WBC # BLD: 4.84 K/UL — SIGNIFICANT CHANGE UP (ref 3.8–10.5)
WBC # FLD AUTO: 4.84 K/UL — SIGNIFICANT CHANGE UP (ref 3.8–10.5)

## 2023-01-11 PROCEDURE — 99214 OFFICE O/P EST MOD 30 MIN: CPT

## 2023-01-11 NOTE — HISTORY OF PRESENT ILLNESS
[Disease: _____________________] : Disease: [unfilled] [T: ___] : T[unfilled] [N: ___] : N[unfilled] [AJCC Stage: ____] : AJCC Stage: [unfilled] [de-identified] : 2011-T2N0M0 (2.2cm), Stage IIA RIGHT breast triple negative invasive ductal carcinoma with [place] score 8 to 9. S/P right lumpectomy (Dr. Keyon Amezquita)--> adjuvant chemotherapy with taxotere and cytoxan for 4 cycles--> IMRT with 5000cGy in 25 fractions followed by boost to the cavity of 1000cGy in 5 fractions (11/9/11-12/21/11 with Dr. Veena Walls). \par \par 12/28/21-Bilateral mammo/US showed left breast 2-3:00 4cmfn 1.4cm irregular mass suspicious of malignancy, US core biopsy recommended; grouped heterogenous calcifications at right breast 10:00 anterior depth also suspicious for malignancy, bx recommended, BIRADS4. \par \par 1/31/2022-S/P left breast 3:00 4cmfn US guided biopsy which showed invasive poorly differentiated ductal carcinoma, taylor score 8/9 (3+3+2), invasive tumor at least 0.8cm, microcalcifications present, no LVI. ER negative <1%, ND negative <1%, HER2 2+ IHC, FISH+. \par \par 2/14/2022-MRI breast-left breast 3:00 biopsy proven cancer associated with 1.4cm mass enhancement;adjacent contiguous large area of linerar nonmassenhancement seen extending from biopsy site into retroareolar/central/posterior left breast up to 9.4cm, 2 site MRI biopsy rec.; [id number].\par \par 3/1/2022-MRI biopsy of both areas showed fibrocystic changes including sclerosing adenosis \par \par 2/15/2022-CT C/A/P-showed multiple mildly enlarged mediastinal and hilar LNs, increased in size compard to7/10/2020. \par Same day bone scan negative for osseous metastasis. \par \par 3/2/2022-PET scan-showed known left breast cancer,adjacent 4.4cm hematoma, indeterminate FDG avid left supraclavicular, mediastinal and hilar LAD new and/or enlarged since 7/10/2020, mediastinal and hilar LNs symmetric distribution suggests a benign etiology;left supraclavicular LN access to US guided biopsy; subcm minimallyFDGavid left axillary LN nonspecific, may be biopsied. \par \par 3/15/2022-Left supraclavicular LN US guided FNA was negative for malignant cells. \par \par Saw Dr. Anmol Sanford pulmonary on 4/11/22. Followup after surgery to assess the mediastinal LNs-felt unlikely related to breast cancer. S/P Dr. Sanford - biopsy negative for malignancy; + granulomatous inflammation\par \par 4/25/2022 - S/P bilateral mastectomies with left axillary sentinel node biopsy on 4/25/22. Pathology revealed:\par 1) RIGHT Breast: focal ADH\par 2) LEFT Breast: Invasive poorly differentiated ductal carcinoma (1.8 cm), DCIS, 2 benign left axillary lymph nodes (one of which was a sentinel LN).\par  ER 0%; ND 0%; HER 2 + by FISH.\par \par 6/9/2022-Taxol/Kanjinti begun. Taxol completed 8/31/2022. Continues with Kanjinti.\par  [de-identified] : Invasive poorly differentiated ductal carcinoma [de-identified] : ER-/[place]-/Her 2+ [de-identified] : 11/2019-My Risk genetic testing negative. BRCA 1/BRCA 2 negative.\par \par Patient under the oncology care of Dr. Abernathy through 7/2022.\par \par Sister leukemia dx'd age 40. Mother had multiple myeloma. Brother stomach cancer age 68. Daughter with CLL age 30. Paternal 2nd cousin breast cancer age 49. Paternal 2nd cousin liver cancer age 40 had hepatitis. Family history is negative for cancer of the ovary, endometrium, prostate, pancreatic and melanoma. \par The patient is not of Ashkenazi ethnic background. \par \par  [de-identified] : Going on trip to [facility].\par No current c/o flank/right side pain. Abdominal US not done yet-daughter to schedule.\par No fevers.\par No current pulmonary complaints. No N/V/D. No hematuria, dysuria.\par Wishes to get vitamin B 12 shots through our office.\par \par Seeing endocrinologist ([facility]), Dr.Halis Brewster-plans to start either Zometa or Prolia for her osteoporosis.\par Daughter Shobha present (is a nurse at [employer], not working currently), and assisted in providing history.\par \par Has had COVID vaccines (Pfizer).

## 2023-01-11 NOTE — PHYSICAL EXAM
[Normal] : affect appropriate [de-identified] : non-toxic appearing [de-identified] : right CW port site-no erythema, swelling or tenderness [de-identified] : b/l CW without nodularity [de-identified] : No gross focal motor deficits

## 2023-02-01 ENCOUNTER — RESULT REVIEW (OUTPATIENT)
Age: 63
End: 2023-02-01

## 2023-02-01 ENCOUNTER — APPOINTMENT (OUTPATIENT)
Dept: INFUSION THERAPY | Facility: HOSPITAL | Age: 63
End: 2023-02-01

## 2023-02-01 ENCOUNTER — APPOINTMENT (OUTPATIENT)
Dept: HEMATOLOGY ONCOLOGY | Facility: CLINIC | Age: 63
End: 2023-02-01
Payer: MEDICAID

## 2023-02-01 LAB
ALBUMIN SERPL ELPH-MCNC: 4.5 G/DL — SIGNIFICANT CHANGE UP (ref 3.3–5)
ALP SERPL-CCNC: 76 U/L — SIGNIFICANT CHANGE UP (ref 40–120)
ALT FLD-CCNC: 18 U/L — SIGNIFICANT CHANGE UP (ref 10–45)
ANION GAP SERPL CALC-SCNC: 15 MMOL/L — SIGNIFICANT CHANGE UP (ref 5–17)
AST SERPL-CCNC: 17 U/L — SIGNIFICANT CHANGE UP (ref 10–40)
BASOPHILS # BLD AUTO: 0.02 K/UL — SIGNIFICANT CHANGE UP (ref 0–0.2)
BASOPHILS NFR BLD AUTO: 0.4 % — SIGNIFICANT CHANGE UP (ref 0–2)
BILIRUB SERPL-MCNC: 0.3 MG/DL — SIGNIFICANT CHANGE UP (ref 0.2–1.2)
BUN SERPL-MCNC: 12 MG/DL — SIGNIFICANT CHANGE UP (ref 7–23)
CALCIUM SERPL-MCNC: 9.5 MG/DL — SIGNIFICANT CHANGE UP (ref 8.4–10.5)
CHLORIDE SERPL-SCNC: 100 MMOL/L — SIGNIFICANT CHANGE UP (ref 96–108)
CO2 SERPL-SCNC: 22 MMOL/L — SIGNIFICANT CHANGE UP (ref 22–31)
CREAT SERPL-MCNC: 0.56 MG/DL — SIGNIFICANT CHANGE UP (ref 0.5–1.3)
EGFR: 102 ML/MIN/1.73M2 — SIGNIFICANT CHANGE UP
EOSINOPHIL # BLD AUTO: 0.15 K/UL — SIGNIFICANT CHANGE UP (ref 0–0.5)
EOSINOPHIL NFR BLD AUTO: 3.2 % — SIGNIFICANT CHANGE UP (ref 0–6)
GLUCOSE SERPL-MCNC: 134 MG/DL — HIGH (ref 70–99)
HCT VFR BLD CALC: 34.9 % — SIGNIFICANT CHANGE UP (ref 34.5–45)
HGB BLD-MCNC: 11.4 G/DL — LOW (ref 11.5–15.5)
IMM GRANULOCYTES NFR BLD AUTO: 0.2 % — SIGNIFICANT CHANGE UP (ref 0–0.9)
LYMPHOCYTES # BLD AUTO: 1.72 K/UL — SIGNIFICANT CHANGE UP (ref 1–3.3)
LYMPHOCYTES # BLD AUTO: 36.9 % — SIGNIFICANT CHANGE UP (ref 13–44)
MCHC RBC-ENTMCNC: 27.2 PG — SIGNIFICANT CHANGE UP (ref 27–34)
MCHC RBC-ENTMCNC: 32.7 G/DL — SIGNIFICANT CHANGE UP (ref 32–36)
MCV RBC AUTO: 83.3 FL — SIGNIFICANT CHANGE UP (ref 80–100)
MONOCYTES # BLD AUTO: 0.35 K/UL — SIGNIFICANT CHANGE UP (ref 0–0.9)
MONOCYTES NFR BLD AUTO: 7.5 % — SIGNIFICANT CHANGE UP (ref 2–14)
NEUTROPHILS # BLD AUTO: 2.41 K/UL — SIGNIFICANT CHANGE UP (ref 1.8–7.4)
NEUTROPHILS NFR BLD AUTO: 51.8 % — SIGNIFICANT CHANGE UP (ref 43–77)
NRBC # BLD: 0 /100 WBCS — SIGNIFICANT CHANGE UP (ref 0–0)
PLATELET # BLD AUTO: 262 K/UL — SIGNIFICANT CHANGE UP (ref 150–400)
POTASSIUM SERPL-MCNC: 3.8 MMOL/L — SIGNIFICANT CHANGE UP (ref 3.5–5.3)
POTASSIUM SERPL-SCNC: 3.8 MMOL/L — SIGNIFICANT CHANGE UP (ref 3.5–5.3)
PROT SERPL-MCNC: 7 G/DL — SIGNIFICANT CHANGE UP (ref 6–8.3)
RBC # BLD: 4.19 M/UL — SIGNIFICANT CHANGE UP (ref 3.8–5.2)
RBC # FLD: 14.5 % — SIGNIFICANT CHANGE UP (ref 10.3–14.5)
SODIUM SERPL-SCNC: 138 MMOL/L — SIGNIFICANT CHANGE UP (ref 135–145)
WBC # BLD: 4.66 K/UL — SIGNIFICANT CHANGE UP (ref 3.8–10.5)
WBC # FLD AUTO: 4.66 K/UL — SIGNIFICANT CHANGE UP (ref 3.8–10.5)

## 2023-02-01 PROCEDURE — 99214 OFFICE O/P EST MOD 30 MIN: CPT

## 2023-02-01 NOTE — ASSESSMENT
[FreeTextEntry1] : Lab work reviewed.\par #1) 62 year old female with history of T2N0M0 (2.2cm), Stage IIA RIGHT breast triple negative invasive ductal carcinoma, s/p adjuvant TC chemotherapy and RT-2011.\par \par 4/25/2022 - S/P bilateral mastectomies with left axillary sentinel node biopsy on 4/25/22. Pathology revealed:\par 1) RIGHT Breast: focal ADH\par 2) LEFT Breast: Invasive poorly differentiated ductal carcinoma (1.8 cm), DCIS, 2 benign left axillary lymph nodes (one of which was a sentinel LN).\par  ER 0%; NJ 0%; HER 2 + by FISH.\par Completed Taxol portion of treatment regimen 8/31/22.\par Clinically stable for Bhavesh today.\par Last echocardiogram 12/2022-LVEF 66%. Cardiology f/u planned.\par \par #2) H/O leukopenia-clinically secondary to prior chemotherapy. Continue to monitor CBC with differential.\par \par Patient was given the opportunity to ask questions.  Her questions have been answered to her apparent satisfaction at this time.  She expressed her understanding and willingness to comply with recommended follow-up.\par \par

## 2023-02-01 NOTE — PHYSICAL EXAM
[Fully active, able to carry on all pre-disease performance without restriction] : Status 0 - Fully active, able to carry on all pre-disease performance without restriction [Normal] : affect appropriate [de-identified] : non-toxic appearing [de-identified] : right CW port site-no erythema, swelling or tenderness [de-identified] : b/l CW without nodularity [de-identified] : No gross focal motor deficits

## 2023-02-01 NOTE — HISTORY OF PRESENT ILLNESS
[Disease: _____________________] : Disease: [unfilled] [T: ___] : T[unfilled] [N: ___] : N[unfilled] [AJCC Stage: ____] : AJCC Stage: [unfilled] [de-identified] : 2011-T2N0M0 (2.2cm), Stage IIA RIGHT breast triple negative invasive ductal carcinoma with [place] score 8 to 9. S/P right lumpectomy (Dr. Keyon Amezquita)--> adjuvant chemotherapy with taxotere and cytoxan for 4 cycles--> IMRT with 5000cGy in 25 fractions followed by boost to the cavity of 1000cGy in 5 fractions (11/9/11-12/21/11 with Dr. Veena Walls). \par \par 12/28/21-Bilateral mammo/US showed left breast 2-3:00 4cmfn 1.4cm irregular mass suspicious of malignancy, US core biopsy recommended; grouped heterogenous calcifications at right breast 10:00 anterior depth also suspicious for malignancy, bx recommended, BIRADS4. \par \par 1/31/2022-S/P left breast 3:00 4cmfn US guided biopsy which showed invasive poorly differentiated ductal carcinoma, taylor score 8/9 (3+3+2), invasive tumor at least 0.8cm, microcalcifications present, no LVI. ER negative <1%, IL negative <1%, HER2 2+ IHC, FISH+. \par \par 2/14/2022-MRI breast-left breast 3:00 biopsy proven cancer associated with 1.4cm mass enhancement;adjacent contiguous large area of linerar nonmassenhancement seen extending from biopsy site into retroareolar/central/posterior left breast up to 9.4cm, 2 site MRI biopsy rec.; [id number].\par \par 3/1/2022-MRI biopsy of both areas showed fibrocystic changes including sclerosing adenosis \par \par 2/15/2022-CT C/A/P-showed multiple mildly enlarged mediastinal and hilar LNs, increased in size compard to7/10/2020. \par Same day bone scan negative for osseous metastasis. \par \par 3/2/2022-PET scan-showed known left breast cancer,adjacent 4.4cm hematoma, indeterminate FDG avid left supraclavicular, mediastinal and hilar LAD new and/or enlarged since 7/10/2020, mediastinal and hilar LNs symmetric distribution suggests a benign etiology;left supraclavicular LN access to US guided biopsy; subcm minimallyFDGavid left axillary LN nonspecific, may be biopsied. \par \par 3/15/2022-Left supraclavicular LN US guided FNA was negative for malignant cells. \par \par Saw Dr. Anmol Sanford pulmonary on 4/11/22. Followup after surgery to assess the mediastinal LNs-felt unlikely related to breast cancer. S/P Dr. Sanford - biopsy negative for malignancy; + granulomatous inflammation\par \par 4/25/2022 - S/P bilateral mastectomies with left axillary sentinel node biopsy on 4/25/22. Pathology revealed:\par 1) RIGHT Breast: focal ADH\par 2) LEFT Breast: Invasive poorly differentiated ductal carcinoma (1.8 cm), DCIS, 2 benign left axillary lymph nodes (one of which was a sentinel LN).\par  ER 0%; IL 0%; HER 2 + by FISH.\par \par 6/9/2022-Taxol/Kanjinti begun. Taxol completed 8/31/2022. Continues with Kanjinti.\par  [de-identified] : Invasive poorly differentiated ductal carcinoma [de-identified] : ER-/[place]-/Her 2+ [de-identified] : 11/2019-My Risk genetic testing negative. BRCA 1/BRCA 2 negative.\par \par Patient under the oncology care of Dr. Abernathy through 7/2022.\par \par Sister leukemia dx'd age 40. Mother had multiple myeloma. Brother stomach cancer age 68. Daughter with CLL age 30. Paternal 2nd cousin breast cancer age 49. Paternal 2nd cousin liver cancer age 40 had hepatitis. Family history is negative for cancer of the ovary, endometrium, prostate, pancreatic and melanoma. \par The patient is not of Ashkenazi ethnic background. \par \par  [de-identified] : No current c/o flank/right side pain. Abdominal US not done.\par No fevers.\par No current pulmonary complaints. No N/V/D. No hematuria, dysuria.\par Has appt. scheduled with cardiologist tomorrow.\par \par Seeing endocrinologist ([facility]), Dr.Halis Brewster-plans to start either Zometa or Prolia for her osteoporosis.\par Daughter Shobha present (is a nurse at [employer], not working currently), and assisted in providing history.\par \feng Has had COVID vaccines (Pfizer).

## 2023-02-02 ENCOUNTER — OUTPATIENT (OUTPATIENT)
Dept: OUTPATIENT SERVICES | Facility: HOSPITAL | Age: 63
LOS: 1 days | End: 2023-02-02
Payer: MEDICAID

## 2023-02-02 ENCOUNTER — APPOINTMENT (OUTPATIENT)
Dept: ULTRASOUND IMAGING | Facility: IMAGING CENTER | Age: 63
End: 2023-02-02
Payer: MEDICAID

## 2023-02-02 ENCOUNTER — APPOINTMENT (OUTPATIENT)
Dept: CARDIOLOGY | Facility: CLINIC | Age: 63
End: 2023-02-02
Payer: MEDICAID

## 2023-02-02 DIAGNOSIS — C50.919 MALIGNANT NEOPLASM OF UNSPECIFIED SITE OF UNSPECIFIED FEMALE BREAST: ICD-10-CM

## 2023-02-02 DIAGNOSIS — Z79.899 OTHER LONG TERM (CURRENT) DRUG THERAPY: ICD-10-CM

## 2023-02-02 PROCEDURE — 99214 OFFICE O/P EST MOD 30 MIN: CPT | Mod: 95

## 2023-02-02 PROCEDURE — 76700 US EXAM ABDOM COMPLETE: CPT | Mod: 26

## 2023-02-02 PROCEDURE — 76700 US EXAM ABDOM COMPLETE: CPT

## 2023-02-02 NOTE — REVIEW OF SYSTEMS
[Feeling Fatigued] : feeling fatigued [Dyspnea on exertion] : dyspnea during exertion [Lower Ext Edema] : lower extremity edema [Numbness (Hypoesthesia)] : numbness [Tingling (Paresthesia)] : tingling [Negative] : Psychiatric [Depression] : no depression [de-identified] : on left side.  [de-identified] : epistaxis

## 2023-02-02 NOTE — REASON FOR VISIT
[Family Member] : family member [FreeTextEntry3] : Dr. Ortega [FreeTextEntry1] : ARI LOYA is a 63 year woman with a history of triple negative right sided invasive ductal carcinoma in 2011, now with recurrent left intraductal carcinoma, seen for follow up dyspnea, edema, and hypertension.\par \par Prior Cancer Treatments:\par ------------------------------------------------------------------------\par Chemo/targeted therapy:\par 2011: adjuvant TH\par 5/2022-831/2022: adjuvant \par 9/10/2022 - Bhavesh q3 weeks to complete one year\par ------------------------------------------------------------------------\par Surgery:\par 2011: R lumpectomy\par 4/25/2022: Bilateral mastectomy with left axillary sentinel node biopsy\par ------------------------------------------------------------------------\par Radiation:\par 2011: IMRT 5000cGy, followed by 1000 cGy to cavity

## 2023-02-02 NOTE — HISTORY OF PRESENT ILLNESS
[FreeTextEntry1] : This visit was conducted using real-time two way audio and video technology. The patient, ARI LOYA, was located at [address] [place] [address] at the time of the visit. The provider, Tremayne Hernandez MD was located at the medical office in [place], [place]. Verbal consent given on 02/02/2023 at  10:15 by ARI LOYA.\par \par \par Interval History:\par In office BPs elevated ~ 120s-170s on 50 mg atenolol and 12.5 mg spironolactone daily.\par Recent echo normal EF and GLS.\par Tolerating atenolol 50, but concerned about elevated BP readings.\par She remains affected by lower extremity swelling. Using compression, but needs f/u with vascular medicine.\par \par \par History:\par T2N0M0- invasive ductal carcinoma. ER/[place] negative, HER2 negative. Received 4 cycles of adjuvant TC with Neulasta. Had bone pains from Neulasta. She then received IMRT with 5000cGy in 25 fractions followed by boost to the cavity of 1000cGy in 5 fractions- 11/9/11-12/21/11.\par \par She has a history of hypertension, ELVIN not able to tolerate CPAP, and chronic dyspnea on exertion for which she saw a Pulmonologist.  She denies chest pain. She had syncope on hot days in setting of dehydration she says. No known cardiovascular disease. She reports a treadmill exercise stress test done a few months ago by Dr. Whitney at [facility] [address] was normal. She can complete the flight of stairs up to her apartment without stopping.\par \par No history of diabetes, cigarette smoking. Several family members had heart disease - her brother had MI in his late 50s. Another brother had a pacemaker placed. A daughter required surgery for valve problem.\par \par 7/18/2022:\par Returns for follow up with daughter Shobha. Complains of bilateral lower extremity edema, worse since starting Taxol/Herceptin, but present for years prior. Notes painful varicose veins. A procedure was performed on the left leg previously. Started spironolactone 12.5 mg daily, but minimal improvement.. She used compression in the past. \par \par Still with CLARK of a few blocks. Echo prior to starting trastuzumab - normal EF and GLS and no structural heart disease. No chest pain. No palpitations. No orthopnea.\par \par Higher doses of BP meds caused low BP before.\par \par 10/17/2022:\par Saw Dr. Mc who prescribed compression hose. She is waiting for delivery.\par LE edema is about the same. She continues to be fatigued. No chest pain. No palpitations.\par She is on adjuvant Kajinti (trastuzumab bio-similar) \par \par \par Cardiovascular Testing:\par -----------------------------------------------------------\par ECG:\par 10/17/2022: NSR 66 bpm, normal ECG\par 7/18/2022: NSR 71 bpm, normal ECG\par 3/31/2022: NSR and normal ECG\par  ----------------------------------------------------------\par Echo:\par 12/28/2022: EF 66%, GLS -21, moderate TR, borderline PHTN\par 8/4/2022: EF 66 %, mild diastolic dysfunction.\par 5/20/2022: EF 60-65 %, -29.3 %, mild MR, normal aortic valve\par -----------------------------------------------------------\par Stress:\par 8/4/2022: submaximal HR response on treadmill exercise\par -----------------------------------------------------------\par CT\par 2/2022: hilar adenopathy, normal vessels. No CACs\par ----------------------------------------------------------

## 2023-02-02 NOTE — ASSESSMENT
[FreeTextEntry1] : 63 year old woman with recurrent breast cancer currently on adjuvant HER-2 targeted therapy (Pazjinlalitha) with complaints of dyspnea on exertion and venous insufficiency/lymphedema.\par \par \par Hypertension: BP elevated. Reportedly intolerant of antihypertensive therapy previously. Needs better control. Given prior dehydration, labile BP, will trial non-diuretic agents and titrate slowly.\par - continue spironolactone 12.5 mg daily for now (reports higher dose caused side effect)\par - atenolol 50 mg daily now for exertional HTN and because of side effect profile\par - add rampiril 2.5 mg daily now\par - follow up BP in 2 weeks and titrate further PRN to target SBP < 140\par \par Risk for cardiotoxicity during HER2 targeted therapy\par - optimize risk factors\par - continue screening with serial echo + GLS every 3 months - due for repeat ~ 2 months\par - follow up in 6 months with me\par \par Venous insufficiency with varicose veins:\par - saw Vascular, trial of compression prior to phlebectomy\par - will reach out to Vascular to arrange f/u appointment\par \par \par Above recommendations discussed with the patient and her daughter and all questions answered to the best of my ability and to their apparent satisfaction.

## 2023-02-03 ENCOUNTER — APPOINTMENT (OUTPATIENT)
Dept: CARDIOLOGY | Facility: CLINIC | Age: 63
End: 2023-02-03

## 2023-02-07 ENCOUNTER — NON-APPOINTMENT (OUTPATIENT)
Age: 63
End: 2023-02-07

## 2023-02-12 ENCOUNTER — OUTPATIENT (OUTPATIENT)
Dept: OUTPATIENT SERVICES | Facility: HOSPITAL | Age: 63
LOS: 1 days | Discharge: HOME | End: 2023-02-12

## 2023-02-12 DIAGNOSIS — Z92.21 PERSONAL HISTORY OF ANTINEOPLASTIC CHEMOTHERAPY: Chronic | ICD-10-CM

## 2023-02-12 DIAGNOSIS — Z90.710 ACQUIRED ABSENCE OF BOTH CERVIX AND UTERUS: Chronic | ICD-10-CM

## 2023-02-12 DIAGNOSIS — Z98.890 OTHER SPECIFIED POSTPROCEDURAL STATES: Chronic | ICD-10-CM

## 2023-02-12 DIAGNOSIS — C50.919 MALIGNANT NEOPLASM OF UNSPECIFIED SITE OF UNSPECIFIED FEMALE BREAST: ICD-10-CM

## 2023-02-18 ENCOUNTER — NON-APPOINTMENT (OUTPATIENT)
Age: 63
End: 2023-02-18

## 2023-02-20 ENCOUNTER — RX RENEWAL (OUTPATIENT)
Age: 63
End: 2023-02-20

## 2023-02-21 ENCOUNTER — RX RENEWAL (OUTPATIENT)
Age: 63
End: 2023-02-21

## 2023-02-22 ENCOUNTER — RESULT REVIEW (OUTPATIENT)
Age: 63
End: 2023-02-22

## 2023-02-22 ENCOUNTER — APPOINTMENT (OUTPATIENT)
Dept: INFUSION THERAPY | Facility: HOSPITAL | Age: 63
End: 2023-02-22

## 2023-02-22 LAB
BASOPHILS # BLD AUTO: 0.03 K/UL — SIGNIFICANT CHANGE UP (ref 0–0.2)
BASOPHILS NFR BLD AUTO: 0.5 % — SIGNIFICANT CHANGE UP (ref 0–2)
EOSINOPHIL # BLD AUTO: 0.11 K/UL — SIGNIFICANT CHANGE UP (ref 0–0.5)
EOSINOPHIL NFR BLD AUTO: 1.8 % — SIGNIFICANT CHANGE UP (ref 0–6)
HCT VFR BLD CALC: 33 % — LOW (ref 34.5–45)
HGB BLD-MCNC: 10.9 G/DL — LOW (ref 11.5–15.5)
IMM GRANULOCYTES NFR BLD AUTO: 1.4 % — HIGH (ref 0–0.9)
LYMPHOCYTES # BLD AUTO: 1.94 K/UL — SIGNIFICANT CHANGE UP (ref 1–3.3)
LYMPHOCYTES # BLD AUTO: 31.2 % — SIGNIFICANT CHANGE UP (ref 13–44)
MCHC RBC-ENTMCNC: 27.3 PG — SIGNIFICANT CHANGE UP (ref 27–34)
MCHC RBC-ENTMCNC: 33 G/DL — SIGNIFICANT CHANGE UP (ref 32–36)
MCV RBC AUTO: 82.5 FL — SIGNIFICANT CHANGE UP (ref 80–100)
MONOCYTES # BLD AUTO: 0.56 K/UL — SIGNIFICANT CHANGE UP (ref 0–0.9)
MONOCYTES NFR BLD AUTO: 9 % — SIGNIFICANT CHANGE UP (ref 2–14)
NEUTROPHILS # BLD AUTO: 3.48 K/UL — SIGNIFICANT CHANGE UP (ref 1.8–7.4)
NEUTROPHILS NFR BLD AUTO: 56.1 % — SIGNIFICANT CHANGE UP (ref 43–77)
NRBC # BLD: 0 /100 WBCS — SIGNIFICANT CHANGE UP (ref 0–0)
PLATELET # BLD AUTO: 333 K/UL — SIGNIFICANT CHANGE UP (ref 150–400)
RBC # BLD: 4 M/UL — SIGNIFICANT CHANGE UP (ref 3.8–5.2)
RBC # FLD: 14.4 % — SIGNIFICANT CHANGE UP (ref 10.3–14.5)
WBC # BLD: 6.21 K/UL — SIGNIFICANT CHANGE UP (ref 3.8–10.5)
WBC # FLD AUTO: 6.21 K/UL — SIGNIFICANT CHANGE UP (ref 3.8–10.5)

## 2023-02-23 DIAGNOSIS — Z51.11 ENCOUNTER FOR ANTINEOPLASTIC CHEMOTHERAPY: ICD-10-CM

## 2023-02-23 DIAGNOSIS — R11.2 NAUSEA WITH VOMITING, UNSPECIFIED: ICD-10-CM

## 2023-02-23 LAB
ALBUMIN SERPL ELPH-MCNC: 4.1 G/DL — SIGNIFICANT CHANGE UP (ref 3.3–5)
ALP SERPL-CCNC: 83 U/L — SIGNIFICANT CHANGE UP (ref 40–120)
ALT FLD-CCNC: 17 U/L — SIGNIFICANT CHANGE UP (ref 10–45)
ANION GAP SERPL CALC-SCNC: 11 MMOL/L — SIGNIFICANT CHANGE UP (ref 5–17)
AST SERPL-CCNC: 15 U/L — SIGNIFICANT CHANGE UP (ref 10–40)
BILIRUB SERPL-MCNC: 0.2 MG/DL — SIGNIFICANT CHANGE UP (ref 0.2–1.2)
BUN SERPL-MCNC: 12 MG/DL — SIGNIFICANT CHANGE UP (ref 7–23)
CALCIUM SERPL-MCNC: 9.1 MG/DL — SIGNIFICANT CHANGE UP (ref 8.4–10.5)
CHLORIDE SERPL-SCNC: 96 MMOL/L — SIGNIFICANT CHANGE UP (ref 96–108)
CO2 SERPL-SCNC: 25 MMOL/L — SIGNIFICANT CHANGE UP (ref 22–31)
CREAT SERPL-MCNC: 0.51 MG/DL — SIGNIFICANT CHANGE UP (ref 0.5–1.3)
EGFR: 105 ML/MIN/1.73M2 — SIGNIFICANT CHANGE UP
GLUCOSE SERPL-MCNC: 134 MG/DL — HIGH (ref 70–99)
POTASSIUM SERPL-MCNC: 4.3 MMOL/L — SIGNIFICANT CHANGE UP (ref 3.5–5.3)
POTASSIUM SERPL-SCNC: 4.3 MMOL/L — SIGNIFICANT CHANGE UP (ref 3.5–5.3)
PROT SERPL-MCNC: 6.8 G/DL — SIGNIFICANT CHANGE UP (ref 6–8.3)
SODIUM SERPL-SCNC: 132 MMOL/L — LOW (ref 135–145)

## 2023-03-02 ENCOUNTER — APPOINTMENT (OUTPATIENT)
Dept: PLASTIC SURGERY | Facility: CLINIC | Age: 63
End: 2023-03-02
Payer: MEDICAID

## 2023-03-02 VITALS
SYSTOLIC BLOOD PRESSURE: 160 MMHG | DIASTOLIC BLOOD PRESSURE: 80 MMHG | BODY MASS INDEX: 27.95 KG/M2 | RESPIRATION RATE: 16 BRPM | HEIGHT: 62 IN | OXYGEN SATURATION: 99 % | HEART RATE: 65 BPM | TEMPERATURE: 97 F | WEIGHT: 151.9 LBS

## 2023-03-02 PROCEDURE — 99204 OFFICE O/P NEW MOD 45 MIN: CPT

## 2023-03-02 NOTE — CONSULT LETTER
[Dear  ___] : Dear  [unfilled], [Consult Letter:] : I had the pleasure of evaluating your patient, [unfilled]. [Please see my note below.] : Please see my note below. [Consult Closing:] : Thank you very much for allowing me to participate in the care of this patient.  If you have any questions, please do not hesitate to contact me. [Sincerely,] : Sincerely, [FreeTextEntry3] : Derek Wan MD, FACS

## 2023-03-02 NOTE — REASON FOR VISIT
[Consultation] : a consultation visit [Family Member] : family member [FreeTextEntry1] : Dr. Nick Leon (Surgical Oncology)

## 2023-03-15 ENCOUNTER — APPOINTMENT (OUTPATIENT)
Dept: HEMATOLOGY ONCOLOGY | Facility: CLINIC | Age: 63
End: 2023-03-15
Payer: MEDICAID

## 2023-03-15 ENCOUNTER — RESULT REVIEW (OUTPATIENT)
Age: 63
End: 2023-03-15

## 2023-03-15 ENCOUNTER — APPOINTMENT (OUTPATIENT)
Dept: INFUSION THERAPY | Facility: HOSPITAL | Age: 63
End: 2023-03-15

## 2023-03-15 LAB
ALBUMIN SERPL ELPH-MCNC: 4.2 G/DL — SIGNIFICANT CHANGE UP (ref 3.3–5)
ALP SERPL-CCNC: 70 U/L — SIGNIFICANT CHANGE UP (ref 40–120)
ALT FLD-CCNC: 18 U/L — SIGNIFICANT CHANGE UP (ref 10–45)
ANION GAP SERPL CALC-SCNC: 12 MMOL/L — SIGNIFICANT CHANGE UP (ref 5–17)
AST SERPL-CCNC: 13 U/L — SIGNIFICANT CHANGE UP (ref 10–40)
BASOPHILS # BLD AUTO: 0.04 K/UL — SIGNIFICANT CHANGE UP (ref 0–0.2)
BASOPHILS NFR BLD AUTO: 0.7 % — SIGNIFICANT CHANGE UP (ref 0–2)
BILIRUB SERPL-MCNC: 0.2 MG/DL — SIGNIFICANT CHANGE UP (ref 0.2–1.2)
BUN SERPL-MCNC: 10 MG/DL — SIGNIFICANT CHANGE UP (ref 7–23)
CALCIUM SERPL-MCNC: 9 MG/DL — SIGNIFICANT CHANGE UP (ref 8.4–10.5)
CHLORIDE SERPL-SCNC: 95 MMOL/L — LOW (ref 96–108)
CO2 SERPL-SCNC: 25 MMOL/L — SIGNIFICANT CHANGE UP (ref 22–31)
CREAT SERPL-MCNC: 0.56 MG/DL — SIGNIFICANT CHANGE UP (ref 0.5–1.3)
EGFR: 102 ML/MIN/1.73M2 — SIGNIFICANT CHANGE UP
EOSINOPHIL # BLD AUTO: 0.19 K/UL — SIGNIFICANT CHANGE UP (ref 0–0.5)
EOSINOPHIL NFR BLD AUTO: 3.4 % — SIGNIFICANT CHANGE UP (ref 0–6)
GLUCOSE SERPL-MCNC: 113 MG/DL — HIGH (ref 70–99)
HCT VFR BLD CALC: 36.4 % — SIGNIFICANT CHANGE UP (ref 34.5–45)
HGB BLD-MCNC: 12.3 G/DL — SIGNIFICANT CHANGE UP (ref 11.5–15.5)
IMM GRANULOCYTES NFR BLD AUTO: 0.7 % — SIGNIFICANT CHANGE UP (ref 0–0.9)
LYMPHOCYTES # BLD AUTO: 2.16 K/UL — SIGNIFICANT CHANGE UP (ref 1–3.3)
LYMPHOCYTES # BLD AUTO: 38.8 % — SIGNIFICANT CHANGE UP (ref 13–44)
MCHC RBC-ENTMCNC: 27.8 PG — SIGNIFICANT CHANGE UP (ref 27–34)
MCHC RBC-ENTMCNC: 33.8 G/DL — SIGNIFICANT CHANGE UP (ref 32–36)
MCV RBC AUTO: 82.4 FL — SIGNIFICANT CHANGE UP (ref 80–100)
MONOCYTES # BLD AUTO: 0.54 K/UL — SIGNIFICANT CHANGE UP (ref 0–0.9)
MONOCYTES NFR BLD AUTO: 9.7 % — SIGNIFICANT CHANGE UP (ref 2–14)
NEUTROPHILS # BLD AUTO: 2.6 K/UL — SIGNIFICANT CHANGE UP (ref 1.8–7.4)
NEUTROPHILS NFR BLD AUTO: 46.7 % — SIGNIFICANT CHANGE UP (ref 43–77)
NRBC # BLD: 0 /100 WBCS — SIGNIFICANT CHANGE UP (ref 0–0)
PLATELET # BLD AUTO: 236 K/UL — SIGNIFICANT CHANGE UP (ref 150–400)
POTASSIUM SERPL-MCNC: 4.3 MMOL/L — SIGNIFICANT CHANGE UP (ref 3.5–5.3)
POTASSIUM SERPL-SCNC: 4.3 MMOL/L — SIGNIFICANT CHANGE UP (ref 3.5–5.3)
PROT SERPL-MCNC: 7 G/DL — SIGNIFICANT CHANGE UP (ref 6–8.3)
RBC # BLD: 4.42 M/UL — SIGNIFICANT CHANGE UP (ref 3.8–5.2)
RBC # FLD: 14.5 % — SIGNIFICANT CHANGE UP (ref 10.3–14.5)
SODIUM SERPL-SCNC: 132 MMOL/L — LOW (ref 135–145)
WBC # BLD: 5.57 K/UL — SIGNIFICANT CHANGE UP (ref 3.8–10.5)
WBC # FLD AUTO: 5.57 K/UL — SIGNIFICANT CHANGE UP (ref 3.8–10.5)

## 2023-03-15 PROCEDURE — 99213 OFFICE O/P EST LOW 20 MIN: CPT | Mod: 95

## 2023-03-15 NOTE — ASSESSMENT
[FreeTextEntry1] : Lab work, 3/3/23 plastic surgery note reviewed.\par #1) 62 year old female with history of T2N0M0 (2.2cm), Stage IIA RIGHT breast triple negative invasive ductal carcinoma, s/p adjuvant TC chemotherapy and RT-2011.\par \par 4/25/2022 - S/P bilateral mastectomies with left axillary sentinel node biopsy on 4/25/22. Pathology revealed:\par 1) RIGHT Breast: focal ADH\par 2) LEFT Breast: Invasive poorly differentiated ductal carcinoma (1.8 cm), DCIS, 2 benign left axillary lymph nodes (one of which was a sentinel LN).\par  ER 0%; [place] 0%; HER 2 + by FISH.\par Completed Taxol portion of treatment regimen 8/31/22.\par Receiving Kanjinti.\par Last echocardiogram 12/2022-LVEF 66%. Cardiology f/u planned.\par \par #2) H/O leukopenia-clinically secondary to prior chemotherapy. Continue to monitor CBC with differential.\par \par Patient was given the opportunity to ask questions.  Her questions have been answered to her apparent satisfaction at this time.  She expressed her understanding and willingness to comply with recommended follow-up.\par \par

## 2023-03-15 NOTE — HISTORY OF PRESENT ILLNESS
[Disease: _____________________] : Disease: [unfilled] [T: ___] : T[unfilled] [N: ___] : N[unfilled] [AJCC Stage: ____] : AJCC Stage: [unfilled] [de-identified] : 2011-T2N0M0 (2.2cm), Stage IIA RIGHT breast triple negative invasive ductal carcinoma with [place] score 8 to 9. S/P right lumpectomy (Dr. Keyon Amezquita)--> adjuvant chemotherapy with taxotere and cytoxan for 4 cycles--> IMRT with 5000cGy in 25 fractions followed by boost to the cavity of 1000cGy in 5 fractions (11/9/11-12/21/11 with Dr. Veena Walls). \par \par 12/28/21-Bilateral mammo/US showed left breast 2-3:00 4cmfn 1.4cm irregular mass suspicious of malignancy, US core biopsy recommended; grouped heterogenous calcifications at right breast 10:00 anterior depth also suspicious for malignancy, bx recommended, BIRADS4. \par \par 1/31/2022-S/P left breast 3:00 4cmfn US guided biopsy which showed invasive poorly differentiated ductal carcinoma, taylor score 8/9 (3+3+2), invasive tumor at least 0.8cm, microcalcifications present, no LVI. ER negative <1%, [place] negative <1%, HER2 2+ IHC, FISH+. \par \par 2/14/2022-MRI breast-left breast 3:00 biopsy proven cancer associated with 1.4cm mass enhancement;adjacent contiguous large area of linerar nonmassenhancement seen extending from biopsy site into retroareolar/central/posterior left breast up to 9.4cm, 2 site MRI biopsy rec.; [id number].\par \par 3/1/2022-MRI biopsy of both areas showed fibrocystic changes including sclerosing adenosis \par \par 2/15/2022-CT C/A/P-showed multiple mildly enlarged mediastinal and hilar LNs, increased in size compard to7/10/2020. \par Same day bone scan negative for osseous metastasis. \par \par 3/2/2022-PET scan-showed known left breast cancer,adjacent 4.4cm hematoma, indeterminate FDG avid left supraclavicular, mediastinal and hilar LAD new and/or enlarged since 7/10/2020, mediastinal and hilar LNs symmetric distribution suggests a benign etiology;left supraclavicular LN access to US guided biopsy; subcm minimallyFDGavid left axillary LN nonspecific, may be biopsied. \par \par 3/15/2022-Left supraclavicular LN US guided FNA was negative for malignant cells. \par \par Saw Dr. Anmol Sanford pulmonary on 4/11/22. Followup after surgery to assess the mediastinal LNs-felt unlikely related to breast cancer. S/P Dr. Sanford - biopsy negative for malignancy; + granulomatous inflammation\par \par 4/25/2022 - S/P bilateral mastectomies with left axillary sentinel node biopsy on 4/25/22. Pathology revealed:\par 1) RIGHT Breast: focal ADH\par 2) LEFT Breast: Invasive poorly differentiated ductal carcinoma (1.8 cm), DCIS, 2 benign left axillary lymph nodes (one of which was a sentinel LN).\par  ER 0%; [place] 0%; HER 2 + by FISH.\par \par 6/9/2022-Taxol/Kanjinti begun. Taxol completed 8/31/2022. Continues with Kanjinti.\par  [de-identified] : Invasive poorly differentiated ductal carcinoma [de-identified] : ER-/[place]-/Her 2+ [de-identified] : 11/2019-My Risk genetic testing negative. BRCA 1/BRCA 2 negative.\par \par Patient under the oncology care of Dr. Abernathy through 7/2022.\par \par Sister leukemia dx'd age 40. Mother had multiple myeloma. Brother stomach cancer age 68. Daughter with CLL age 30. Paternal 2nd cousin breast cancer age 49. Paternal 2nd cousin liver cancer age 40 had hepatitis. Family history is negative for cancer of the ovary, endometrium, prostate, pancreatic and melanoma. \par The patient is not of Ashkenazi ethnic background. \par \par  [de-identified] : Daughter Shobha (is a nurse at [employer], not working currently), and provided history.\par Had treatment today. Feeling well.\par Saw plastic surgeon and was planning on breast implant reconstruction in the future, though is not decided on this.\par Saw cardiologist-added new BP med.\par No fevers.\par No current pulmonary complaints. No N/V/D. No hematuria, dysuria.\par \par Seeing endocrinologist ([facility]), Dr.Halis Brewster-plans to start either Zometa or Prolia for her osteoporosis.\par \par

## 2023-03-15 NOTE — REASON FOR VISIT
[Home] : at home, [unfilled] , at the time of the visit. [Medical Office: ([facility])___] : at the medical office located in  [Follow-Up Visit] : a follow-up [Family Member] : family member [FreeTextEntry3] : daughter [FreeTextEntry2] : breast cancer

## 2023-03-17 NOTE — ED PROVIDER NOTE - CROS ED CARDIOVAS ALL NEG
- - - Bilobed Transposition Flap Text: The defect edges were debeveled with a #15 scalpel blade. Given the location of the defect and the proximity to free margins a bilobed transposition flap was deemed most appropriate. Using a sterile surgical marker, an appropriate bilobe flap drawn around the defect. The area thus outlined was incised deep to adipose tissue with a #15 scalpel blade. The skin margins were undermined to an appropriate distance in all directions utilizing iris scissors. Following this, the designed flap was carried over into the primary defect and sutured into place.

## 2023-04-05 ENCOUNTER — APPOINTMENT (OUTPATIENT)
Dept: INFUSION THERAPY | Facility: HOSPITAL | Age: 63
End: 2023-04-05

## 2023-04-05 ENCOUNTER — RESULT REVIEW (OUTPATIENT)
Age: 63
End: 2023-04-05

## 2023-04-05 ENCOUNTER — APPOINTMENT (OUTPATIENT)
Dept: HEMATOLOGY ONCOLOGY | Facility: CLINIC | Age: 63
End: 2023-04-05
Payer: MEDICAID

## 2023-04-05 VITALS
RESPIRATION RATE: 16 BRPM | TEMPERATURE: 97.3 F | HEART RATE: 65 BPM | OXYGEN SATURATION: 98 % | DIASTOLIC BLOOD PRESSURE: 78 MMHG | SYSTOLIC BLOOD PRESSURE: 137 MMHG

## 2023-04-05 LAB
ALBUMIN SERPL ELPH-MCNC: 4.4 G/DL — SIGNIFICANT CHANGE UP (ref 3.3–5)
ALP SERPL-CCNC: 75 U/L — SIGNIFICANT CHANGE UP (ref 40–120)
ALT FLD-CCNC: 15 U/L — SIGNIFICANT CHANGE UP (ref 10–45)
ANION GAP SERPL CALC-SCNC: 10 MMOL/L — SIGNIFICANT CHANGE UP (ref 5–17)
AST SERPL-CCNC: 13 U/L — SIGNIFICANT CHANGE UP (ref 10–40)
BASOPHILS # BLD AUTO: 0.03 K/UL — SIGNIFICANT CHANGE UP (ref 0–0.2)
BASOPHILS NFR BLD AUTO: 0.7 % — SIGNIFICANT CHANGE UP (ref 0–2)
BILIRUB SERPL-MCNC: 0.2 MG/DL — SIGNIFICANT CHANGE UP (ref 0.2–1.2)
BUN SERPL-MCNC: 14 MG/DL — SIGNIFICANT CHANGE UP (ref 7–23)
CALCIUM SERPL-MCNC: 9.6 MG/DL — SIGNIFICANT CHANGE UP (ref 8.4–10.5)
CHLORIDE SERPL-SCNC: 98 MMOL/L — SIGNIFICANT CHANGE UP (ref 96–108)
CO2 SERPL-SCNC: 25 MMOL/L — SIGNIFICANT CHANGE UP (ref 22–31)
CREAT SERPL-MCNC: 0.55 MG/DL — SIGNIFICANT CHANGE UP (ref 0.5–1.3)
EGFR: 103 ML/MIN/1.73M2 — SIGNIFICANT CHANGE UP
EOSINOPHIL # BLD AUTO: 0.11 K/UL — SIGNIFICANT CHANGE UP (ref 0–0.5)
EOSINOPHIL NFR BLD AUTO: 2.4 % — SIGNIFICANT CHANGE UP (ref 0–6)
GLUCOSE SERPL-MCNC: 127 MG/DL — HIGH (ref 70–99)
HCT VFR BLD CALC: 33.2 % — LOW (ref 34.5–45)
HGB BLD-MCNC: 11.2 G/DL — LOW (ref 11.5–15.5)
IMM GRANULOCYTES NFR BLD AUTO: 1.1 % — HIGH (ref 0–0.9)
LYMPHOCYTES # BLD AUTO: 1.55 K/UL — SIGNIFICANT CHANGE UP (ref 1–3.3)
LYMPHOCYTES # BLD AUTO: 34.4 % — SIGNIFICANT CHANGE UP (ref 13–44)
MCHC RBC-ENTMCNC: 28.2 PG — SIGNIFICANT CHANGE UP (ref 27–34)
MCHC RBC-ENTMCNC: 33.7 G/DL — SIGNIFICANT CHANGE UP (ref 32–36)
MCV RBC AUTO: 83.6 FL — SIGNIFICANT CHANGE UP (ref 80–100)
MONOCYTES # BLD AUTO: 0.44 K/UL — SIGNIFICANT CHANGE UP (ref 0–0.9)
MONOCYTES NFR BLD AUTO: 9.8 % — SIGNIFICANT CHANGE UP (ref 2–14)
NEUTROPHILS # BLD AUTO: 2.32 K/UL — SIGNIFICANT CHANGE UP (ref 1.8–7.4)
NEUTROPHILS NFR BLD AUTO: 51.6 % — SIGNIFICANT CHANGE UP (ref 43–77)
NRBC # BLD: 0 /100 WBCS — SIGNIFICANT CHANGE UP (ref 0–0)
PLATELET # BLD AUTO: 281 K/UL — SIGNIFICANT CHANGE UP (ref 150–400)
POTASSIUM SERPL-MCNC: 4.8 MMOL/L — SIGNIFICANT CHANGE UP (ref 3.5–5.3)
POTASSIUM SERPL-SCNC: 4.8 MMOL/L — SIGNIFICANT CHANGE UP (ref 3.5–5.3)
PROT SERPL-MCNC: 6.5 G/DL — SIGNIFICANT CHANGE UP (ref 6–8.3)
RBC # BLD: 3.97 M/UL — SIGNIFICANT CHANGE UP (ref 3.8–5.2)
RBC # FLD: 14.3 % — SIGNIFICANT CHANGE UP (ref 10.3–14.5)
SODIUM SERPL-SCNC: 133 MMOL/L — LOW (ref 135–145)
WBC # BLD: 4.5 K/UL — SIGNIFICANT CHANGE UP (ref 3.8–10.5)
WBC # FLD AUTO: 4.5 K/UL — SIGNIFICANT CHANGE UP (ref 3.8–10.5)

## 2023-04-05 PROCEDURE — 99214 OFFICE O/P EST MOD 30 MIN: CPT

## 2023-04-05 NOTE — ASSESSMENT
[FreeTextEntry1] : Lab work reviewed.\par #1) 62 year old female with history of T2N0M0 (2.2cm), Stage IIA RIGHT breast triple negative invasive ductal carcinoma, s/p adjuvant TC chemotherapy and RT-2011.\par \par 4/25/2022 - S/P bilateral mastectomies with left axillary sentinel node biopsy on 4/25/22. Pathology revealed:\par 1) RIGHT Breast: focal ADH\par 2) LEFT Breast: Invasive poorly differentiated ductal carcinoma (1.8 cm), DCIS, 2 benign left axillary lymph nodes (one of which was a sentinel LN).\par  ER 0%; [place] 0%; HER 2 + by FISH.\par Completed Taxol portion of treatment regimen 8/31/22.\par Receiving Kanjinti. Clinically stable for treatment today.\par Last echocardiogram 12/2022-LVEF 66%. Next echocardiogram ordered.\par \par #2) H/O leukopenia-clinically secondary to prior chemotherapy. Continue to monitor CBC with differential.\par \par #3) muscle cramps/spasms-cyclobenzaprine ordered-potential side effect profile reviewed.\par \par Patient was given the opportunity to ask questions.  Her questions have been answered to her apparent satisfaction at this time.  She expressed her understanding and willingness to comply with recommended follow-up.\par \par

## 2023-04-05 NOTE — HISTORY OF PRESENT ILLNESS
[Disease: _____________________] : Disease: [unfilled] [T: ___] : T[unfilled] [N: ___] : N[unfilled] [AJCC Stage: ____] : AJCC Stage: [unfilled] [de-identified] : 2011-T2N0M0 (2.2cm), Stage IIA RIGHT breast triple negative invasive ductal carcinoma with [place] score 8 to 9. S/P right lumpectomy (Dr. Keyon Amezquita)--> adjuvant chemotherapy with taxotere and cytoxan for 4 cycles--> IMRT with 5000cGy in 25 fractions followed by boost to the cavity of 1000cGy in 5 fractions (11/9/11-12/21/11 with Dr. Veena Walls). \par \par 12/28/21-Bilateral mammo/US showed left breast 2-3:00 4cmfn 1.4cm irregular mass suspicious of malignancy, US core biopsy recommended; grouped heterogenous calcifications at right breast 10:00 anterior depth also suspicious for malignancy, bx recommended, BIRADS4. \par \par 1/31/2022-S/P left breast 3:00 4cmfn US guided biopsy which showed invasive poorly differentiated ductal carcinoma, taylor score 8/9 (3+3+2), invasive tumor at least 0.8cm, microcalcifications present, no LVI. ER negative <1%, IN negative <1%, HER2 2+ IHC, FISH+. \par \par 2/14/2022-MRI breast-left breast 3:00 biopsy proven cancer associated with 1.4cm mass enhancement;adjacent contiguous large area of linerar nonmassenhancement seen extending from biopsy site into retroareolar/central/posterior left breast up to 9.4cm, 2 site MRI biopsy rec.; [id number].\par \par 3/1/2022-MRI biopsy of both areas showed fibrocystic changes including sclerosing adenosis \par \par 2/15/2022-CT C/A/P-showed multiple mildly enlarged mediastinal and hilar LNs, increased in size compard to7/10/2020. \par Same day bone scan negative for osseous metastasis. \par \par 3/2/2022-PET scan-showed known left breast cancer,adjacent 4.4cm hematoma, indeterminate FDG avid left supraclavicular, mediastinal and hilar LAD new and/or enlarged since 7/10/2020, mediastinal and hilar LNs symmetric distribution suggests a benign etiology;left supraclavicular LN access to US guided biopsy; subcm minimallyFDGavid left axillary LN nonspecific, may be biopsied. \par \par 3/15/2022-Left supraclavicular LN US guided FNA was negative for malignant cells. \par \par Saw Dr. Anmol Sanford pulmonary on 4/11/22. Followup after surgery to assess the mediastinal LNs-felt unlikely related to breast cancer. S/P Dr. Sanford - biopsy negative for malignancy; + granulomatous inflammation\par \par 4/25/2022 - S/P bilateral mastectomies with left axillary sentinel node biopsy on 4/25/22. Pathology revealed:\par 1) RIGHT Breast: focal ADH\par 2) LEFT Breast: Invasive poorly differentiated ductal carcinoma (1.8 cm), DCIS, 2 benign left axillary lymph nodes (one of which was a sentinel LN).\par  ER 0%; IN 0%; HER 2 + by FISH.\par \par 6/9/2022-Taxol/Kanjinti begun. Taxol completed 8/31/2022. Continues with Kanjinti.\par  [de-identified] : Invasive poorly differentiated ductal carcinoma [de-identified] : ER-/[place]-/Her 2+ [de-identified] : 11/2019-My Risk genetic testing negative. BRCA 1/BRCA 2 negative.\par \par Patient under the oncology care of Dr. Abernathy through 7/2022.\par \par Sister leukemia dx'd age 40. Mother had multiple myeloma. Brother stomach cancer age 68. Daughter with CLL age 30. Paternal 2nd cousin breast cancer age 49. Paternal 2nd cousin liver cancer age 40 had hepatitis. Family history is negative for cancer of the ovary, endometrium, prostate, pancreatic and melanoma. \par The patient is not of Ashkenazi ethnic background. \par \par  [de-identified] : +Intermittent muscle spasms.\par Daughter Shobha (is a nurse at [employer], not working currently), and provided history-on speakerphone for discussion.\par Has seen plastic surgeon and was planning on breast implant reconstruction in the future, though is not decided on this.\par No fevers.\par No current pulmonary complaints. No N/V/D. No hematuria, dysuria.\par \par Seeing endocrinologist ([facility]), Dr.Halis Brewster-plans to start either Zometa or Prolia for her osteoporosis.\par \par

## 2023-04-06 ENCOUNTER — APPOINTMENT (OUTPATIENT)
Dept: SURGICAL ONCOLOGY | Facility: CLINIC | Age: 63
End: 2023-04-06

## 2023-04-10 NOTE — ED PROVIDER NOTE - NS ED MD DISPO DISCHARGE
Home
soft
O-T Advancement Flap Text: The defect edges were debeveled with a #15 scalpel blade.  Given the location of the defect, shape of the defect and the proximity to free margins an O-T advancement flap was deemed most appropriate.  Using a sterile surgical marker, an appropriate advancement flap was drawn incorporating the defect and placing the expected incisions within the relaxed skin tension lines where possible.    The area thus outlined was incised deep to adipose tissue with a #15 scalpel blade.  The skin margins were undermined to an appropriate distance in all directions utilizing iris scissors.

## 2023-04-13 ENCOUNTER — APPOINTMENT (OUTPATIENT)
Dept: PULMONOLOGY | Facility: CLINIC | Age: 63
End: 2023-04-13

## 2023-04-19 ENCOUNTER — OUTPATIENT (OUTPATIENT)
Dept: OUTPATIENT SERVICES | Facility: HOSPITAL | Age: 63
LOS: 1 days | Discharge: HOME | End: 2023-04-19

## 2023-04-19 DIAGNOSIS — Z90.710 ACQUIRED ABSENCE OF BOTH CERVIX AND UTERUS: Chronic | ICD-10-CM

## 2023-04-19 DIAGNOSIS — Z92.21 PERSONAL HISTORY OF ANTINEOPLASTIC CHEMOTHERAPY: Chronic | ICD-10-CM

## 2023-04-19 DIAGNOSIS — C50.919 MALIGNANT NEOPLASM OF UNSPECIFIED SITE OF UNSPECIFIED FEMALE BREAST: ICD-10-CM

## 2023-04-19 DIAGNOSIS — Z98.890 OTHER SPECIFIED POSTPROCEDURAL STATES: Chronic | ICD-10-CM

## 2023-04-26 ENCOUNTER — RESULT REVIEW (OUTPATIENT)
Age: 63
End: 2023-04-26

## 2023-04-26 ENCOUNTER — APPOINTMENT (OUTPATIENT)
Dept: INFUSION THERAPY | Facility: HOSPITAL | Age: 63
End: 2023-04-26

## 2023-04-26 ENCOUNTER — APPOINTMENT (OUTPATIENT)
Dept: HEMATOLOGY ONCOLOGY | Facility: CLINIC | Age: 63
End: 2023-04-26
Payer: MEDICAID

## 2023-04-26 VITALS
DIASTOLIC BLOOD PRESSURE: 64 MMHG | HEART RATE: 50 BPM | SYSTOLIC BLOOD PRESSURE: 131 MMHG | OXYGEN SATURATION: 98 % | TEMPERATURE: 97.3 F | RESPIRATION RATE: 16 BRPM

## 2023-04-26 LAB
ALBUMIN SERPL ELPH-MCNC: 4.5 G/DL — SIGNIFICANT CHANGE UP (ref 3.3–5)
ALP SERPL-CCNC: 71 U/L — SIGNIFICANT CHANGE UP (ref 40–120)
ALT FLD-CCNC: 16 U/L — SIGNIFICANT CHANGE UP (ref 10–45)
ANION GAP SERPL CALC-SCNC: 11 MMOL/L — SIGNIFICANT CHANGE UP (ref 5–17)
AST SERPL-CCNC: 17 U/L — SIGNIFICANT CHANGE UP (ref 10–40)
BASOPHILS # BLD AUTO: 0.03 K/UL — SIGNIFICANT CHANGE UP (ref 0–0.2)
BASOPHILS NFR BLD AUTO: 0.7 % — SIGNIFICANT CHANGE UP (ref 0–2)
BILIRUB SERPL-MCNC: 0.3 MG/DL — SIGNIFICANT CHANGE UP (ref 0.2–1.2)
BUN SERPL-MCNC: 12 MG/DL — SIGNIFICANT CHANGE UP (ref 7–23)
CALCIUM SERPL-MCNC: 9 MG/DL — SIGNIFICANT CHANGE UP (ref 8.4–10.5)
CHLORIDE SERPL-SCNC: 97 MMOL/L — SIGNIFICANT CHANGE UP (ref 96–108)
CO2 SERPL-SCNC: 24 MMOL/L — SIGNIFICANT CHANGE UP (ref 22–31)
CREAT SERPL-MCNC: 0.61 MG/DL — SIGNIFICANT CHANGE UP (ref 0.5–1.3)
EGFR: 100 ML/MIN/1.73M2 — SIGNIFICANT CHANGE UP
EOSINOPHIL # BLD AUTO: 0.13 K/UL — SIGNIFICANT CHANGE UP (ref 0–0.5)
EOSINOPHIL NFR BLD AUTO: 3.2 % — SIGNIFICANT CHANGE UP (ref 0–6)
GLUCOSE SERPL-MCNC: 125 MG/DL — HIGH (ref 70–99)
HCT VFR BLD CALC: 34.8 % — SIGNIFICANT CHANGE UP (ref 34.5–45)
HGB BLD-MCNC: 11.4 G/DL — LOW (ref 11.5–15.5)
IMM GRANULOCYTES NFR BLD AUTO: 0.5 % — SIGNIFICANT CHANGE UP (ref 0–0.9)
LYMPHOCYTES # BLD AUTO: 1.59 K/UL — SIGNIFICANT CHANGE UP (ref 1–3.3)
LYMPHOCYTES # BLD AUTO: 39.3 % — SIGNIFICANT CHANGE UP (ref 13–44)
MCHC RBC-ENTMCNC: 27.9 PG — SIGNIFICANT CHANGE UP (ref 27–34)
MCHC RBC-ENTMCNC: 32.8 G/DL — SIGNIFICANT CHANGE UP (ref 32–36)
MCV RBC AUTO: 85.1 FL — SIGNIFICANT CHANGE UP (ref 80–100)
MONOCYTES # BLD AUTO: 0.41 K/UL — SIGNIFICANT CHANGE UP (ref 0–0.9)
MONOCYTES NFR BLD AUTO: 10.1 % — SIGNIFICANT CHANGE UP (ref 2–14)
NEUTROPHILS # BLD AUTO: 1.87 K/UL — SIGNIFICANT CHANGE UP (ref 1.8–7.4)
NEUTROPHILS NFR BLD AUTO: 46.2 % — SIGNIFICANT CHANGE UP (ref 43–77)
NRBC # BLD: 0 /100 WBCS — SIGNIFICANT CHANGE UP (ref 0–0)
PLATELET # BLD AUTO: 227 K/UL — SIGNIFICANT CHANGE UP (ref 150–400)
POTASSIUM SERPL-MCNC: 4.3 MMOL/L — SIGNIFICANT CHANGE UP (ref 3.5–5.3)
POTASSIUM SERPL-SCNC: 4.3 MMOL/L — SIGNIFICANT CHANGE UP (ref 3.5–5.3)
PROT SERPL-MCNC: 6.9 G/DL — SIGNIFICANT CHANGE UP (ref 6–8.3)
RBC # BLD: 4.09 M/UL — SIGNIFICANT CHANGE UP (ref 3.8–5.2)
RBC # FLD: 14.2 % — SIGNIFICANT CHANGE UP (ref 10.3–14.5)
SODIUM SERPL-SCNC: 132 MMOL/L — LOW (ref 135–145)
WBC # BLD: 4.05 K/UL — SIGNIFICANT CHANGE UP (ref 3.8–10.5)
WBC # FLD AUTO: 4.05 K/UL — SIGNIFICANT CHANGE UP (ref 3.8–10.5)

## 2023-04-26 PROCEDURE — 99214 OFFICE O/P EST MOD 30 MIN: CPT

## 2023-04-26 RX ORDER — OMEPRAZOLE 10 MG/1
1 CAPSULE, DELAYED RELEASE ORAL
Qty: 0 | Refills: 0 | DISCHARGE

## 2023-04-26 RX ORDER — DEXLANSOPRAZOLE 30 MG/1
1 CAPSULE, DELAYED RELEASE ORAL
Qty: 0 | Refills: 0 | DISCHARGE

## 2023-04-26 RX ORDER — ACETAMINOPHEN 500 MG
2 TABLET ORAL
Qty: 0 | Refills: 0 | DISCHARGE

## 2023-04-26 RX ORDER — CHOLECALCIFEROL (VITAMIN D3) 125 MCG
1 CAPSULE ORAL
Qty: 0 | Refills: 0 | DISCHARGE

## 2023-04-26 RX ORDER — SPIRONOLACTONE 25 MG/1
0.5 TABLET, FILM COATED ORAL
Qty: 0 | Refills: 0 | DISCHARGE

## 2023-04-26 RX ORDER — PITAVASTATIN CALCIUM 1.04 MG/1
1 TABLET, FILM COATED ORAL
Qty: 0 | Refills: 0 | DISCHARGE

## 2023-04-26 NOTE — ASSESSMENT
[FreeTextEntry1] : CBC with diff., CMP reviewed.\par #1) 62 year old female with history of T2N0M0 (2.2cm), Stage IIA RIGHT breast triple negative invasive ductal carcinoma, s/p adjuvant TC chemotherapy and RT-2011.\par \par 4/25/2022 - S/P bilateral mastectomies with left axillary sentinel node biopsy on 4/25/22. Pathology revealed:\par 1) RIGHT Breast: focal ADH\par 2) LEFT Breast: Invasive poorly differentiated ductal carcinoma (1.8 cm), DCIS, 2 benign left axillary lymph nodes (one of which was a sentinel LN).\par  ER 0%; NV 0%; HER 2 + by FISH.\par Completed Taxol portion of treatment regimen 8/31/22.\par Receiving Kanjinti. Clinically stable for treatment today.\par Have ordered f/u bone scan in light of bony complaints.\par Last echocardiogram 12/2022-LVEF 66%. Next echocardiogram was ordered-pending.\par \par #2) H/O leukopenia-clinically secondary to prior chemotherapy. Continue to monitor CBC with differential.\par \par #3) epistaxis-patient to f/u with ENT MD-referral made. Saline nose drops.\par \par Patient was given the opportunity to ask questions.  Her questions have been answered to her apparent satisfaction at this time.  She expressed her understanding and willingness to comply with recommended follow-up.\par \par

## 2023-04-26 NOTE — REVIEW OF SYSTEMS
[Negative] : Allergic/Immunologic [Joint Pain] : joint pain [Confused] : no confusion [Fainting] : no fainting

## 2023-04-26 NOTE — HISTORY OF PRESENT ILLNESS
[Disease: _____________________] : Disease: [unfilled] [T: ___] : T[unfilled] [N: ___] : N[unfilled] [AJCC Stage: ____] : AJCC Stage: [unfilled] [de-identified] : 2011-T2N0M0 (2.2cm), Stage IIA RIGHT breast triple negative invasive ductal carcinoma with [place] score 8 to 9. S/P right lumpectomy (Dr. Keyon Amezquita)--> adjuvant chemotherapy with taxotere and cytoxan for 4 cycles--> IMRT with 5000cGy in 25 fractions followed by boost to the cavity of 1000cGy in 5 fractions (11/9/11-12/21/11 with Dr. Veena Walls). \par \par 12/28/21-Bilateral mammo/US showed left breast 2-3:00 4cmfn 1.4cm irregular mass suspicious of malignancy, US core biopsy recommended; grouped heterogenous calcifications at right breast 10:00 anterior depth also suspicious for malignancy, bx recommended, BIRADS4. \par \par 1/31/2022-S/P left breast 3:00 4cmfn US guided biopsy which showed invasive poorly differentiated ductal carcinoma, taylor score 8/9 (3+3+2), invasive tumor at least 0.8cm, microcalcifications present, no LVI. ER negative <1%, LA negative <1%, HER2 2+ IHC, FISH+. \par \par 2/14/2022-MRI breast-left breast 3:00 biopsy proven cancer associated with 1.4cm mass enhancement;adjacent contiguous large area of linerar nonmassenhancement seen extending from biopsy site into retroareolar/central/posterior left breast up to 9.4cm, 2 site MRI biopsy rec.; [id number].\par \par 3/1/2022-MRI biopsy of both areas showed fibrocystic changes including sclerosing adenosis \par \par 2/15/2022-CT C/A/P-showed multiple mildly enlarged mediastinal and hilar LNs, increased in size compard to7/10/2020. \par Same day bone scan negative for osseous metastasis. \par \par 3/2/2022-PET scan-showed known left breast cancer,adjacent 4.4cm hematoma, indeterminate FDG avid left supraclavicular, mediastinal and hilar LAD new and/or enlarged since 7/10/2020, mediastinal and hilar LNs symmetric distribution suggests a benign etiology;left supraclavicular LN access to US guided biopsy; subcm minimallyFDGavid left axillary LN nonspecific, may be biopsied. \par \par 3/15/2022-Left supraclavicular LN US guided FNA was negative for malignant cells. \par \par Saw Dr. Anmol Sanford pulmonary on 4/11/22. Followup after surgery to assess the mediastinal LNs-felt unlikely related to breast cancer. S/P Dr. Sanford - biopsy negative for malignancy; + granulomatous inflammation\par \par 4/25/2022 - S/P bilateral mastectomies with left axillary sentinel node biopsy on 4/25/22. Pathology revealed:\par 1) RIGHT Breast: focal ADH\par 2) LEFT Breast: Invasive poorly differentiated ductal carcinoma (1.8 cm), DCIS, 2 benign left axillary lymph nodes (one of which was a sentinel LN).\par  ER 0%; LA 0%; HER 2 + by FISH.\par \par 6/9/2022-Taxol/Kanjinti begun. Taxol completed 8/31/2022. Continues with Kanjinti.\par  [de-identified] : Invasive poorly differentiated ductal carcinoma [de-identified] : ER-/[place]-/Her 2+ [de-identified] : 11/2019-My Risk genetic testing negative. BRCA 1/BRCA 2 negative.\par \par Patient under the oncology care of Dr. Abernathy through 7/2022.\par \par Sister leukemia dx'd age 40. Mother had multiple myeloma. Brother stomach cancer age 68. Daughter with CLL age 30. Paternal 2nd cousin breast cancer age 49. Paternal 2nd cousin liver cancer age 40 had hepatitis. Family history is negative for cancer of the ovary, endometrium, prostate, pancreatic and melanoma. \par The patient is not of Ashkenazi ethnic background. \par \par  [de-identified] : Right shoulder and back pain-has had x 1 year-no acute worsening. No paresthesias.\par Had right nares nose bleeds a few times since last visit. No c/o H/A.\par No fevers.\par No current pulmonary complaints. No N/V/D. No hematuria, dysuria.\par \par Daughter Shobha (is a nurse at [employer], not working currently), and assisted in providing history.\par \par Seeing endocrinologist ([facility]), Dr.Halis Brewster-plans to start either Zometa or Prolia for her osteoporosis.\par \par

## 2023-04-27 ENCOUNTER — APPOINTMENT (OUTPATIENT)
Dept: SURGICAL ONCOLOGY | Facility: CLINIC | Age: 63
End: 2023-04-27
Payer: MEDICAID

## 2023-04-27 DIAGNOSIS — Z51.11 ENCOUNTER FOR ANTINEOPLASTIC CHEMOTHERAPY: ICD-10-CM

## 2023-04-27 DIAGNOSIS — R11.2 NAUSEA WITH VOMITING, UNSPECIFIED: ICD-10-CM

## 2023-05-04 ENCOUNTER — APPOINTMENT (OUTPATIENT)
Dept: CARDIOLOGY | Facility: CLINIC | Age: 63
End: 2023-05-04
Payer: MEDICAID

## 2023-05-04 PROCEDURE — 93306 TTE W/DOPPLER COMPLETE: CPT

## 2023-05-04 PROCEDURE — 93356 MYOCRD STRAIN IMG SPCKL TRCK: CPT

## 2023-05-08 NOTE — ED PROVIDER NOTE - DOMESTIC TRAVEL HIGH RISK QUESTION
Detail Level: Detailed Depth Of Biopsy: dermis Was A Bandage Applied: Yes Size Of Lesion In Cm: 0 Biopsy Type: H and E Biopsy Method: Dermablade Anesthesia Type: 1% lidocaine with epinephrine Anesthesia Volume In Cc (Will Not Render If 0): 1 Hemostasis: Drysol Wound Care: Petrolatum Dressing: bandage Destruction After The Procedure: No Type Of Destruction Used: Curettage Curettage Text: The wound bed was treated with curettage after the biopsy was performed. Cryotherapy Text: The wound bed was treated with cryotherapy after the biopsy was performed. Electrodesiccation Text: The wound bed was treated with electrodesiccation after the biopsy was performed. Electrodesiccation And Curettage Text: The wound bed was treated with electrodesiccation and curettage after the biopsy was performed. Silver Nitrate Text: The wound bed was treated with silver nitrate after the biopsy was performed. Lab: [id number] Lab Facility: [id number] Consent: Written consent was obtained and risks were reviewed including but not limited to scarring, infection, bleeding, scabbing, incomplete removal, nerve damage and allergy to anesthesia. Post-Care Instructions: In detail post-care instructions were provided to the patient. Patient is to keep the biopsy site dry overnight, and then apply Vaseline twice daily until healed. Keep covered with bandage for the first week. No Notification Instructions: Patient will be notified of biopsy results. However, patient instructed to call the office if not contacted within 2 weeks. Billing Type: Third-Party Bill Information: Selecting Yes will display possible errors in your note based on the variables you have selected. This validation is only offered as a suggestion for you. PLEASE NOTE THAT THE VALIDATION TEXT WILL BE REMOVED WHEN YOU FINALIZE YOUR NOTE. IF YOU WANT TO FAX A PRELIMINARY NOTE YOU WILL NEED TO TOGGLE THIS TO 'NO' IF YOU DO NOT WANT IT IN YOUR FAXED NOTE.

## 2023-05-11 ENCOUNTER — APPOINTMENT (OUTPATIENT)
Dept: OTOLARYNGOLOGY | Facility: CLINIC | Age: 63
End: 2023-05-11
Payer: MEDICAID

## 2023-05-11 VITALS
OXYGEN SATURATION: 98 % | DIASTOLIC BLOOD PRESSURE: 82 MMHG | HEART RATE: 52 BPM | TEMPERATURE: 97.3 F | HEIGHT: 62 IN | BODY MASS INDEX: 27.79 KG/M2 | SYSTOLIC BLOOD PRESSURE: 122 MMHG | WEIGHT: 151 LBS

## 2023-05-11 DIAGNOSIS — H90.5 UNSPECIFIED SENSORINEURAL HEARING LOSS: ICD-10-CM

## 2023-05-11 PROCEDURE — 31231 NASAL ENDOSCOPY DX: CPT

## 2023-05-11 PROCEDURE — 99203 OFFICE O/P NEW LOW 30 MIN: CPT | Mod: 25

## 2023-05-12 NOTE — ASSESSMENT
[FreeTextEntry1] : 63 year old female with right sided epistaxis - normal exam today but dry nasal mucosa. Discussed nasal moisture techniques - humidifier, nasal saline spray, ayr saline gel.

## 2023-05-12 NOTE — HISTORY OF PRESENT ILLNESS
[de-identified] : 63 year old female with right sided epistaxis that resolved on its own with pressure. She reports not using nasal sprays, was previously using flonase. Not on blood thinners. She is currently undergoing chemotherapy, and this started three months ago. \par \par Also reports hearing loss. Has not had recent audio. Denies vertigo, otalgia, otorrhea.

## 2023-05-12 NOTE — PROCEDURE
[FreeTextEntry1] : Nasal endoscopy [FreeTextEntry2] : Epistaxis [FreeTextEntry3] : Procedure: nasal endoscopy \par \par Pre-operative diagnosis: \par \par Indication: Anterior rhinoscopy insufficient to diagnose pathology\par \par Details:\par After decongestant and lidocaine was sprayed in the bilateral nasal cavities, a flexible laryngoscope was inserted into the right nares. The nasal cavity, middle meatus, ETO, nasopharynx, and glottis were visualized. The endoscope was then inserted into the left nares and the nasal cavity, middle meatus, and ETO was visualized. The patient tolerated procedure well.\par \par Findings:\par dry nasal mucosa, crusting

## 2023-05-17 ENCOUNTER — RESULT REVIEW (OUTPATIENT)
Age: 63
End: 2023-05-17

## 2023-05-17 ENCOUNTER — APPOINTMENT (OUTPATIENT)
Dept: HEMATOLOGY ONCOLOGY | Facility: CLINIC | Age: 63
End: 2023-05-17
Payer: MEDICAID

## 2023-05-17 ENCOUNTER — APPOINTMENT (OUTPATIENT)
Dept: INFUSION THERAPY | Facility: HOSPITAL | Age: 63
End: 2023-05-17

## 2023-05-17 VITALS
HEART RATE: 51 BPM | OXYGEN SATURATION: 100 % | SYSTOLIC BLOOD PRESSURE: 135 MMHG | RESPIRATION RATE: 16 BRPM | DIASTOLIC BLOOD PRESSURE: 80 MMHG | TEMPERATURE: 97.5 F

## 2023-05-17 LAB
ALBUMIN SERPL ELPH-MCNC: 4.8 G/DL — SIGNIFICANT CHANGE UP (ref 3.3–5)
ALP SERPL-CCNC: 75 U/L — SIGNIFICANT CHANGE UP (ref 40–120)
ALT FLD-CCNC: 15 U/L — SIGNIFICANT CHANGE UP (ref 10–45)
ANION GAP SERPL CALC-SCNC: 13 MMOL/L — SIGNIFICANT CHANGE UP (ref 5–17)
AST SERPL-CCNC: 17 U/L — SIGNIFICANT CHANGE UP (ref 10–40)
BASOPHILS # BLD AUTO: 0.03 K/UL — SIGNIFICANT CHANGE UP (ref 0–0.2)
BASOPHILS NFR BLD AUTO: 0.7 % — SIGNIFICANT CHANGE UP (ref 0–2)
BILIRUB SERPL-MCNC: 0.4 MG/DL — SIGNIFICANT CHANGE UP (ref 0.2–1.2)
BUN SERPL-MCNC: 12 MG/DL — SIGNIFICANT CHANGE UP (ref 7–23)
CALCIUM SERPL-MCNC: 9.9 MG/DL — SIGNIFICANT CHANGE UP (ref 8.4–10.5)
CHLORIDE SERPL-SCNC: 100 MMOL/L — SIGNIFICANT CHANGE UP (ref 96–108)
CO2 SERPL-SCNC: 23 MMOL/L — SIGNIFICANT CHANGE UP (ref 22–31)
CREAT SERPL-MCNC: 0.58 MG/DL — SIGNIFICANT CHANGE UP (ref 0.5–1.3)
EGFR: 102 ML/MIN/1.73M2 — SIGNIFICANT CHANGE UP
EOSINOPHIL # BLD AUTO: 0.11 K/UL — SIGNIFICANT CHANGE UP (ref 0–0.5)
EOSINOPHIL NFR BLD AUTO: 2.7 % — SIGNIFICANT CHANGE UP (ref 0–6)
GLUCOSE SERPL-MCNC: 115 MG/DL — HIGH (ref 70–99)
HCT VFR BLD CALC: 36.1 % — SIGNIFICANT CHANGE UP (ref 34.5–45)
HGB BLD-MCNC: 12 G/DL — SIGNIFICANT CHANGE UP (ref 11.5–15.5)
IMM GRANULOCYTES NFR BLD AUTO: 0.2 % — SIGNIFICANT CHANGE UP (ref 0–0.9)
LYMPHOCYTES # BLD AUTO: 1.58 K/UL — SIGNIFICANT CHANGE UP (ref 1–3.3)
LYMPHOCYTES # BLD AUTO: 38.7 % — SIGNIFICANT CHANGE UP (ref 13–44)
MCHC RBC-ENTMCNC: 28.4 PG — SIGNIFICANT CHANGE UP (ref 27–34)
MCHC RBC-ENTMCNC: 33.2 G/DL — SIGNIFICANT CHANGE UP (ref 32–36)
MCV RBC AUTO: 85.3 FL — SIGNIFICANT CHANGE UP (ref 80–100)
MONOCYTES # BLD AUTO: 0.38 K/UL — SIGNIFICANT CHANGE UP (ref 0–0.9)
MONOCYTES NFR BLD AUTO: 9.3 % — SIGNIFICANT CHANGE UP (ref 2–14)
NEUTROPHILS # BLD AUTO: 1.97 K/UL — SIGNIFICANT CHANGE UP (ref 1.8–7.4)
NEUTROPHILS NFR BLD AUTO: 48.4 % — SIGNIFICANT CHANGE UP (ref 43–77)
NRBC # BLD: 0 /100 WBCS — SIGNIFICANT CHANGE UP (ref 0–0)
PLATELET # BLD AUTO: 257 K/UL — SIGNIFICANT CHANGE UP (ref 150–400)
POTASSIUM SERPL-MCNC: 3.8 MMOL/L — SIGNIFICANT CHANGE UP (ref 3.5–5.3)
POTASSIUM SERPL-SCNC: 3.8 MMOL/L — SIGNIFICANT CHANGE UP (ref 3.5–5.3)
PROT SERPL-MCNC: 7.4 G/DL — SIGNIFICANT CHANGE UP (ref 6–8.3)
RBC # BLD: 4.23 M/UL — SIGNIFICANT CHANGE UP (ref 3.8–5.2)
RBC # FLD: 14 % — SIGNIFICANT CHANGE UP (ref 10.3–14.5)
SODIUM SERPL-SCNC: 137 MMOL/L — SIGNIFICANT CHANGE UP (ref 135–145)
WBC # BLD: 4.08 K/UL — SIGNIFICANT CHANGE UP (ref 3.8–10.5)
WBC # FLD AUTO: 4.08 K/UL — SIGNIFICANT CHANGE UP (ref 3.8–10.5)

## 2023-05-17 PROCEDURE — 99214 OFFICE O/P EST MOD 30 MIN: CPT

## 2023-05-17 NOTE — ASSESSMENT
[FreeTextEntry1] : CBC with diff., CMP, cardiac echo report, 5/11/23 ENT note reviewed.\par #1) 62 year old female with history of T2N0M0 (2.2cm), Stage IIA RIGHT breast triple negative invasive ductal carcinoma, s/p adjuvant TC chemotherapy and RT-2011.\par \par 4/25/2022 - S/P bilateral mastectomies with left axillary sentinel node biopsy on 4/25/22. Pathology revealed:\par 1) RIGHT Breast: focal ADH\par 2) LEFT Breast: Invasive poorly differentiated ductal carcinoma (1.8 cm), DCIS, 2 benign left axillary lymph nodes (one of which was a sentinel LN).\par  ER 0%; IA 0%; HER 2 + by FISH.\par Completed Taxol portion of treatment regimen 8/31/22.\par Receiving Kanjinti. Clinically stable for treatment today.\par Had ordered f/u bone scan in light of h/o bony complaints-patient has scheduled for this week.\par Last echocardiogram 5/4/2023-LVEF 60-65%. \par \par #2) H/O leukopenia-clinically secondary to prior chemotherapy. Continue to monitor CBC with differential.\par \par Patient was given the opportunity to ask questions.  Her questions have been answered to her apparent satisfaction at this time.  She expressed her understanding and willingness to comply with recommended follow-up.\par \par

## 2023-05-17 NOTE — HISTORY OF PRESENT ILLNESS
[Disease: _____________________] : Disease: [unfilled] [T: ___] : T[unfilled] [N: ___] : N[unfilled] [AJCC Stage: ____] : AJCC Stage: [unfilled] [de-identified] : 2011-T2N0M0 (2.2cm), Stage IIA RIGHT breast triple negative invasive ductal carcinoma with [place] score 8 to 9. S/P right lumpectomy (Dr. Keyon Amezquita)--> adjuvant chemotherapy with taxotere and cytoxan for 4 cycles--> IMRT with 5000cGy in 25 fractions followed by boost to the cavity of 1000cGy in 5 fractions (11/9/11-12/21/11 with Dr. Veena Walls). \par \par 12/28/21-Bilateral mammo/US showed left breast 2-3:00 4cmfn 1.4cm irregular mass suspicious of malignancy, US core biopsy recommended; grouped heterogenous calcifications at right breast 10:00 anterior depth also suspicious for malignancy, bx recommended, BIRADS4. \par \par 1/31/2022-S/P left breast 3:00 4cmfn US guided biopsy which showed invasive poorly differentiated ductal carcinoma, taylor score 8/9 (3+3+2), invasive tumor at least 0.8cm, microcalcifications present, no LVI. ER negative <1%, MO negative <1%, HER2 2+ IHC, FISH+. \par \par 2/14/2022-MRI breast-left breast 3:00 biopsy proven cancer associated with 1.4cm mass enhancement;adjacent contiguous large area of linerar nonmassenhancement seen extending from biopsy site into retroareolar/central/posterior left breast up to 9.4cm, 2 site MRI biopsy rec.; [id number].\par \par 3/1/2022-MRI biopsy of both areas showed fibrocystic changes including sclerosing adenosis \par \par 2/15/2022-CT C/A/P-showed multiple mildly enlarged mediastinal and hilar LNs, increased in size compard to7/10/2020. \par Same day bone scan negative for osseous metastasis. \par \par 3/2/2022-PET scan-showed known left breast cancer,adjacent 4.4cm hematoma, indeterminate FDG avid left supraclavicular, mediastinal and hilar LAD new and/or enlarged since 7/10/2020, mediastinal and hilar LNs symmetric distribution suggests a benign etiology;left supraclavicular LN access to US guided biopsy; subcm minimallyFDGavid left axillary LN nonspecific, may be biopsied. \par \par 3/15/2022-Left supraclavicular LN US guided FNA was negative for malignant cells. \par \par Saw Dr. Anmol Sanford pulmonary on 4/11/22. Followup after surgery to assess the mediastinal LNs-felt unlikely related to breast cancer. S/P Dr. Sanford - biopsy negative for malignancy; + granulomatous inflammation\par \par 4/25/2022 - S/P bilateral mastectomies with left axillary sentinel node biopsy on 4/25/22. Pathology revealed:\par 1) RIGHT Breast: focal ADH\par 2) LEFT Breast: Invasive poorly differentiated ductal carcinoma (1.8 cm), DCIS, 2 benign left axillary lymph nodes (one of which was a sentinel LN).\par  ER 0%; MO 0%; HER 2 + by FISH.\par \par 6/9/2022-Taxol/Kanjinti begun. Taxol completed 8/31/2022. Continues with Kanjinti.\par  [de-identified] : Invasive poorly differentiated ductal carcinoma [de-identified] : ER-/[place]-/Her 2+ [de-identified] : 11/2019-My Risk genetic testing negative. BRCA 1/BRCA 2 negative.\par \par Patient under the oncology care of Dr. Abernathy through 7/2022.\par \par Sister leukemia dx'd age 40. Mother had multiple myeloma. Brother stomach cancer age 68. Daughter with CLL age 30. Paternal 2nd cousin breast cancer age 49. Paternal 2nd cousin liver cancer age 40 had hepatitis. Family history is negative for cancer of the ovary, endometrium, prostate, pancreatic and melanoma. \par The patient is not of Ashkenazi ethnic background. \par \par  [de-identified] : Overall, feels well currently. \par Sees surgeon in f/u tomorrow.\par Saw ENT MD-told nares dry; no further epistaxis.\par No fevers.\par No current pulmonary complaints. No N/V/D. No hematuria, dysuria.\par \par Daughter Shobha (is a nurse at [employer], not working currently), and assisted in providing history.\par \par Seeing endocrinologist ([facility]), Dr.Halis Brewster-plans to start either Zometa or Prolia for her osteoporosis.\par \par

## 2023-05-18 ENCOUNTER — APPOINTMENT (OUTPATIENT)
Dept: SURGICAL ONCOLOGY | Facility: CLINIC | Age: 63
End: 2023-05-18
Payer: MEDICAID

## 2023-05-18 VITALS
SYSTOLIC BLOOD PRESSURE: 132 MMHG | WEIGHT: 147 LBS | HEART RATE: 54 BPM | DIASTOLIC BLOOD PRESSURE: 70 MMHG | OXYGEN SATURATION: 99 % | HEIGHT: 62 IN | BODY MASS INDEX: 27.05 KG/M2 | RESPIRATION RATE: 14 BRPM

## 2023-05-18 PROCEDURE — 99214 OFFICE O/P EST MOD 30 MIN: CPT

## 2023-05-18 NOTE — PHYSICAL EXAM
[Normal] : supple, no neck mass and thyroid not enlarged [Normal Supraclavicular Lymph Nodes] : normal supraclavicular lymph nodes [Normal Axillary Lymph Nodes] : normal axillary lymph nodes [Normal] : oriented to person, place and time, with appropriate affect [de-identified] : S/p bilateral mastectomies with loose skin bilaterally but no masses

## 2023-05-18 NOTE — ASSESSMENT
[FreeTextEntry1] : IMP: \par \par She is s/p Bilateral mastectomies with left axillary sentinel node biopsy on 4/25/22.  \par Pathology revealed:\par 1) RIGHT Breast: focal ADH\par 2) LEFT Breast: Invasive poorly differentiated ductal carcinoma (1.8 cm), DCIS, 2 benign left axillary lymph nodes (one of which was a sentinel node), negative margins.  T1cN0, ER-IN-HER2 equivocal (POSITIVE by in situ hybridization).\par \par Adjuvant chemotherapy w/ Dr. Linnette Ortega\par Herceptin for one more dose\par \par PLAN:\par RTO Q4 months\par possible excess skin excision at that time\par

## 2023-05-18 NOTE — HISTORY OF PRESENT ILLNESS
[de-identified] : ARI LOYA  is a 63 year old female here for a follow-up visit, s/p Bilateral mastectomies with left axillary sentinel node biopsy on 4/25/22.\par \par In 2011, pt was diagnosed with T2N0M0 (2.2cm), stage IIA RIGHT breast triple negative invasive ductal carcinoma with [place] score 8 to 9.  She underwent lumpectomy Dr. Keyon Amezquita and then received adjuvant chemotherapy with taxotere and cytoxan for 4 cycles.  She then received IMRT with 5000cGy in 25 fractions followed by boost to the cavity of 1000cGy in 5 fractions 11/9/11-12/21/11 with Dr. Veena Walls. \par \par Pt underwent left breast 3:00 4cmfn US guided biopsy on 1/31/22 which showed invasive poorly differentiated ductal carcinoma, [place] score 8/9 (3+3+2), invasive tumor at least 0.8cm, micro calcifications present, no LVI.  ER-/LA-, HER2 equivocal pending CISH. \par \par MRI show an extensive area of enhancement area the biopsy site, CT (CAP) shows mediastinal adenopathy. Subsequent biopsies of the breast and the supraclavicular lymph node were negative. BRCA: no significant mutation. Plan for bilateral mastectomies & axillary sentinel node biopsy\par \par She is s/p bilateral mastectomies with left axillary sentinel node biopsy on 4/25/22.  Pathology revealed:\par 1) RIGHT Breast: focal ADH\par 2) LEFT Breast: Invasive poorly differentiated ductal carcinoma (1.8 cm), DCIS, 2 benign left axillary lymph nodes (one of which was a sentinel node), negative margins.  T1cN0, ER-LA-HER2 equivocal (POSITIVE by in situ hybridization).\par \par Post-op had left breast hematoma that was drained in office and was noted to be resorbing when seen by my colleague, Dr Fatima. \par Hematoma re-absorbing\par cellulitis on left chest wall 7/25/22 improving, gave Duricef 500 mg BID \par \par she is presently on Herceoptin q 3 weeks\par \par

## 2023-05-19 ENCOUNTER — NON-APPOINTMENT (OUTPATIENT)
Age: 63
End: 2023-05-19

## 2023-05-19 ENCOUNTER — APPOINTMENT (OUTPATIENT)
Dept: NUCLEAR MEDICINE | Facility: IMAGING CENTER | Age: 63
End: 2023-05-19
Payer: MEDICAID

## 2023-05-19 ENCOUNTER — OUTPATIENT (OUTPATIENT)
Dept: OUTPATIENT SERVICES | Facility: HOSPITAL | Age: 63
LOS: 1 days | End: 2023-05-19
Payer: MEDICAID

## 2023-05-19 DIAGNOSIS — Z92.21 PERSONAL HISTORY OF ANTINEOPLASTIC CHEMOTHERAPY: Chronic | ICD-10-CM

## 2023-05-19 DIAGNOSIS — Z98.890 OTHER SPECIFIED POSTPROCEDURAL STATES: Chronic | ICD-10-CM

## 2023-05-19 DIAGNOSIS — Z90.710 ACQUIRED ABSENCE OF BOTH CERVIX AND UTERUS: Chronic | ICD-10-CM

## 2023-05-19 DIAGNOSIS — C50.919 MALIGNANT NEOPLASM OF UNSPECIFIED SITE OF UNSPECIFIED FEMALE BREAST: ICD-10-CM

## 2023-05-19 PROCEDURE — 78306 BONE IMAGING WHOLE BODY: CPT | Mod: 26

## 2023-05-19 PROCEDURE — A9561: CPT

## 2023-05-19 PROCEDURE — 78306 BONE IMAGING WHOLE BODY: CPT

## 2023-06-07 ENCOUNTER — RESULT REVIEW (OUTPATIENT)
Age: 63
End: 2023-06-07

## 2023-06-07 ENCOUNTER — APPOINTMENT (OUTPATIENT)
Dept: INFUSION THERAPY | Facility: HOSPITAL | Age: 63
End: 2023-06-07

## 2023-06-07 LAB
ALBUMIN SERPL ELPH-MCNC: 4.5 G/DL — SIGNIFICANT CHANGE UP (ref 3.3–5)
ALP SERPL-CCNC: 73 U/L — SIGNIFICANT CHANGE UP (ref 40–120)
ALT FLD-CCNC: 11 U/L — SIGNIFICANT CHANGE UP (ref 10–45)
ANION GAP SERPL CALC-SCNC: 11 MMOL/L — SIGNIFICANT CHANGE UP (ref 5–17)
AST SERPL-CCNC: 13 U/L — SIGNIFICANT CHANGE UP (ref 10–40)
BASOPHILS # BLD AUTO: 0.04 K/UL — SIGNIFICANT CHANGE UP (ref 0–0.2)
BASOPHILS NFR BLD AUTO: 0.9 % — SIGNIFICANT CHANGE UP (ref 0–2)
BILIRUB SERPL-MCNC: 0.2 MG/DL — SIGNIFICANT CHANGE UP (ref 0.2–1.2)
BUN SERPL-MCNC: 9 MG/DL — SIGNIFICANT CHANGE UP (ref 7–23)
CALCIUM SERPL-MCNC: 9.1 MG/DL — SIGNIFICANT CHANGE UP (ref 8.4–10.5)
CHLORIDE SERPL-SCNC: 100 MMOL/L — SIGNIFICANT CHANGE UP (ref 96–108)
CO2 SERPL-SCNC: 25 MMOL/L — SIGNIFICANT CHANGE UP (ref 22–31)
CREAT SERPL-MCNC: 0.53 MG/DL — SIGNIFICANT CHANGE UP (ref 0.5–1.3)
EGFR: 104 ML/MIN/1.73M2 — SIGNIFICANT CHANGE UP
EOSINOPHIL # BLD AUTO: 0.09 K/UL — SIGNIFICANT CHANGE UP (ref 0–0.5)
EOSINOPHIL NFR BLD AUTO: 2 % — SIGNIFICANT CHANGE UP (ref 0–6)
GLUCOSE SERPL-MCNC: 111 MG/DL — HIGH (ref 70–99)
HCT VFR BLD CALC: 34.8 % — SIGNIFICANT CHANGE UP (ref 34.5–45)
HGB BLD-MCNC: 11.3 G/DL — LOW (ref 11.5–15.5)
IMM GRANULOCYTES NFR BLD AUTO: 0.4 % — SIGNIFICANT CHANGE UP (ref 0–0.9)
LYMPHOCYTES # BLD AUTO: 1.61 K/UL — SIGNIFICANT CHANGE UP (ref 1–3.3)
LYMPHOCYTES # BLD AUTO: 35.4 % — SIGNIFICANT CHANGE UP (ref 13–44)
MCHC RBC-ENTMCNC: 28 PG — SIGNIFICANT CHANGE UP (ref 27–34)
MCHC RBC-ENTMCNC: 32.5 G/DL — SIGNIFICANT CHANGE UP (ref 32–36)
MCV RBC AUTO: 86.1 FL — SIGNIFICANT CHANGE UP (ref 80–100)
MONOCYTES # BLD AUTO: 0.43 K/UL — SIGNIFICANT CHANGE UP (ref 0–0.9)
MONOCYTES NFR BLD AUTO: 9.5 % — SIGNIFICANT CHANGE UP (ref 2–14)
NEUTROPHILS # BLD AUTO: 2.36 K/UL — SIGNIFICANT CHANGE UP (ref 1.8–7.4)
NEUTROPHILS NFR BLD AUTO: 51.8 % — SIGNIFICANT CHANGE UP (ref 43–77)
NRBC # BLD: 0 /100 WBCS — SIGNIFICANT CHANGE UP (ref 0–0)
PLATELET # BLD AUTO: 262 K/UL — SIGNIFICANT CHANGE UP (ref 150–400)
POTASSIUM SERPL-MCNC: 3.9 MMOL/L — SIGNIFICANT CHANGE UP (ref 3.5–5.3)
POTASSIUM SERPL-SCNC: 3.9 MMOL/L — SIGNIFICANT CHANGE UP (ref 3.5–5.3)
PROT SERPL-MCNC: 6.9 G/DL — SIGNIFICANT CHANGE UP (ref 6–8.3)
RBC # BLD: 4.04 M/UL — SIGNIFICANT CHANGE UP (ref 3.8–5.2)
RBC # FLD: 13.6 % — SIGNIFICANT CHANGE UP (ref 10.3–14.5)
SODIUM SERPL-SCNC: 137 MMOL/L — SIGNIFICANT CHANGE UP (ref 135–145)
WBC # BLD: 4.55 K/UL — SIGNIFICANT CHANGE UP (ref 3.8–10.5)
WBC # FLD AUTO: 4.55 K/UL — SIGNIFICANT CHANGE UP (ref 3.8–10.5)

## 2023-06-16 ENCOUNTER — RX RENEWAL (OUTPATIENT)
Age: 63
End: 2023-06-16

## 2023-07-07 ENCOUNTER — APPOINTMENT (OUTPATIENT)
Dept: PULMONOLOGY | Facility: CLINIC | Age: 63
End: 2023-07-07
Payer: MEDICAID

## 2023-07-07 VITALS
TEMPERATURE: 97.2 F | WEIGHT: 147 LBS | SYSTOLIC BLOOD PRESSURE: 130 MMHG | HEIGHT: 62 IN | OXYGEN SATURATION: 98 % | HEART RATE: 46 BPM | RESPIRATION RATE: 16 BRPM | DIASTOLIC BLOOD PRESSURE: 70 MMHG | BODY MASS INDEX: 27.05 KG/M2

## 2023-07-07 PROCEDURE — 99214 OFFICE O/P EST MOD 30 MIN: CPT

## 2023-07-07 RX ORDER — FAMOTIDINE 40 MG/1
40 TABLET, FILM COATED ORAL
Qty: 90 | Refills: 1 | Status: ACTIVE | COMMUNITY
Start: 2023-07-07 | End: 1900-01-01

## 2023-07-07 NOTE — HISTORY OF PRESENT ILLNESS
[TextBox_4] : Ms. LOYA is a 63 year old female originally [place] from with a history of non smoking, breast cancer originally on the right 2011, s/p lumpectomy with radiation and chemo, arthritis, HTN, ELVIN, newly diagnosed left sided breast tumor (intraductal carcinoma) 2022 presents to the office for follow up. She is accompanied by her daughter Shobha.\par \par Her chief complaint is follow up. \par \par Patient reports she is in normal state of health. She reports her breathing has good. Patient reports she tried to get oral appliance, she would have to pay $3000 out of pocket. She admits to snoring, un refreshed sleep, EDS, AM HA and fatigue. \par Patient reports she is willing to move forward with APAP for ELVIN. \par Patient reports she completed chemotherapy for Breast CA one month ago and will have infusion port removed in August. \par Patient reports she is compliant with Pepcid for GERD with benefit. \par \par Patient denies fever, chills, SOB @ rest or exertion, cough, wheezing, nasal congestion, runny nose, PND, or heartburn/reflux.\par

## 2023-07-07 NOTE — REASON FOR VISIT
[Follow-Up] : a follow-up visit [Asthma] : asthma [Sleep Apnea] : sleep apnea [Family Member] : family member [TextBox_44] :  allergies, GERD, abnormal CT of the chest (adenopathy)

## 2023-07-07 NOTE — REVIEW OF SYSTEMS
[Fatigue] : fatigue [EDS] : EDS [GERD] : gerd [Negative] : Psychiatric [Fever] : no fever [Recent Wt Gain (___ Lbs)] : ~T no recent weight gain [Chills] : no chills [Poor Appetite] : no poor appetite [Recent Wt Loss (___ Lbs)] : ~T no recent weight loss [Abdominal Pain] : no abdominal pain [Nausea] : no nausea [Vomiting] : no vomiting [Diarrhea] : no diarrhea [Constipation] : no constipation [Dysphagia] : no dysphagia [Bleeding] : no bleeding [Food Intolerance] : no food intolerance [Hepatic Disease] : no hepatic disease

## 2023-07-26 NOTE — H&P PST ADULT - NS PRO FEM REPRO HEALTH SCREEN
Subjective   Tracy is a 5 year old female who is accompanied by:mother     Well Child Assessment:  History was provided by the mother. Lives with: separate house holds.   Nutrition  Types of intake include cereals, eggs, fish, cow's milk, fruits, meats and vegetables.   Dental  The patient has a dental home. The patient brushes teeth regularly. Last dental exam was less than 6 months ago.   Elimination  Elimination problems do not include constipation. Toilet training is complete.   Behavioral  (No concerns)   Sleep  The patient does not snore. There are no sleep problems.   Safety  There is no smoking in the home. Home has working smoke alarms? yes. Home has working carbon monoxide alarms? yes.   Screening  Immunizations are up-to-date. There are no risk factors for hearing loss. There are no risk factors for anemia. There are no risk factors for tuberculosis.       She is doing well, no acute illness.  She attends an in home  with her 3 year old sister with 8 other children and will start  in fall.  Socializing well.  Her language is well developed, well understood, knows numbers and letters, drawing and painting, very active out side, swimming and on bikes.        Review of Systems   Constitutional: Negative.    HENT: Negative.    Eyes: Negative.    Respiratory: Negative.  Negative for snoring.    Cardiovascular: Negative.    Gastrointestinal: Negative.  Negative for constipation.   Endocrine: Negative.    Genitourinary: Negative.    Musculoskeletal: Negative.    Skin: Negative.    Allergic/Immunologic: Negative.    Neurological: Negative.    Hematological: Negative.    Psychiatric/Behavioral: Negative.  Negative for sleep disturbance.       Tracy has a past medical history of Brachycephaly, COVID-19 (7/11/2022), History of 2019 novel coronavirus disease (COVID-19) (12/1/2020), No abnormality of hip joint detected on examination, Non-recurrent acute serous otitis media of both ears (10/1/2022),  Plagiocephaly, Torticollis, and Urticaria (10/1/2022).  Tracy does not have any active problems on file.  Tracy has no past surgical history on file.  Her family history includes Alcohol Abuse in her mother; Hyperlipidemia in her mother; Myocardial Infarction in her paternal grandfather; Other in her mother; Strabismus in her father.  Tracy currently has no medications in their medication list.  Tracy has No Known Allergies.  Social History     Social History Narrative    Lives with dad and pat grandparents.  Dad and pt and sister,   due to maternal alcohol abuse.     NO pets, No smokers; no guns    Seeing also mother       Objective   /62 (BP Location: RUE - Right upper extremity, Patient Position: Sitting)   Pulse 94   Temp 98.3 °F (36.8 °C) (Temporal)   Ht 3' 8.57\" (1.132 m)   Wt 19.8 kg (43 lb 10.4 oz)   BMI 15.45 kg/m²   BSA 0.79 m²   BMI Percentile: 59 %ile (Z= 0.22) based on CDC (Girls, 2-20 Years) BMI-for-age based on BMI available as of 7/26/2023.  Growth parameters appropriate for age? Yes    Physical Exam  Vitals reviewed.   Constitutional:       General: She is active.      Appearance: Normal appearance. She is well-developed and normal weight.   HENT:      Head: Atraumatic.      Right Ear: Tympanic membrane normal.      Left Ear: Tympanic membrane normal.      Nose: Nose normal.      Mouth/Throat:      Mouth: Mucous membranes are moist.      Pharynx: Oropharynx is clear.      Neck: Normal range of motion and neck supple.   Eyes:      Extraocular Movements: Extraocular movements intact.      Conjunctiva/sclera: Conjunctivae normal.      Pupils: Pupils are equal, round, and reactive to light.   Cardiovascular:      Rate and Rhythm: Normal rate and regular rhythm.      Heart sounds: Normal heart sounds. No murmur heard.  Pulmonary:      Effort: Pulmonary effort is normal. No respiratory distress.      Breath sounds: Normal breath sounds.   Abdominal:      General: There is no  distension.      Palpations: Abdomen is soft. There is no mass.      Tenderness: There is no abdominal tenderness.      Hernia: No hernia is present.   Genitourinary:     Comments: Normal exam  Musculoskeletal:         General: No swelling, tenderness, deformity or signs of injury.   Lymphadenopathy:      Cervical: No cervical adenopathy.   Skin:     General: Skin is warm.      Findings: No rash.   Neurological:      General: No focal deficit present.      Mental Status: She is alert.      Cranial Nerves: No cranial nerve deficit.      Sensory: No sensory deficit.      Motor: No weakness.      Coordination: Coordination normal.      Gait: Gait normal.   Psychiatric:         Mood and Affect: Mood normal.         Behavior: Behavior normal.         Assessment     Well child  Growing and developing well      Counseling  Nutrition/Weight Management:  Assessment and discussion of current Nutrition behaviors performed:  Yes  Assessment and discussion of current Physical Activity behaviors performed:   Yes  Stimulation: reading daily, creative activities, limited screen time  Safety: car seat, child proofing home, sun block  Handouts provided  Immunization History   Administered Date(s) Administered   • COVID Moderna 6M-5Y 11/10/2022, 12/08/2022   • DTaP, 5 Pertussis Antigens (DAPTACEL) 12/04/2019   • DTaP/HIB/IPV 2018, 2018, 2018   • DTaP/IPV 09/02/2022   • Hep A, ped/adol, 2 dose 09/18/2019, 08/05/2020   • Hep B, adolescent or pediatric 2018, 2018, 02/02/2019   • Hib (PRP-T) 09/18/2019   • Influenza, injectable, quadrivalent, preservative free, pediatric 2018, 02/02/2019   • Influenza, quadrivalent, preserve-free 09/18/2019, 10/05/2020, 11/10/2022   • MMR 06/12/2019   • Measles Mumps Rubella Varicella 09/02/2022   • Pneumococcal Conjugate 13 Valent Vacc (Prevnar 13) 2018, 2018, 2018, 06/12/2019   • Rotavirus - monovalent 2018, 2018   • Varicella 06/12/2019          Follow-up yearly for a well visit, or sooner as needed.    Jennifer Ivy MD   mammogram

## 2023-07-27 ENCOUNTER — OUTPATIENT (OUTPATIENT)
Dept: OUTPATIENT SERVICES | Facility: HOSPITAL | Age: 63
LOS: 1 days | Discharge: HOME | End: 2023-07-27

## 2023-07-27 DIAGNOSIS — Z90.710 ACQUIRED ABSENCE OF BOTH CERVIX AND UTERUS: Chronic | ICD-10-CM

## 2023-07-27 DIAGNOSIS — C50.919 MALIGNANT NEOPLASM OF UNSPECIFIED SITE OF UNSPECIFIED FEMALE BREAST: ICD-10-CM

## 2023-07-27 DIAGNOSIS — Z98.890 OTHER SPECIFIED POSTPROCEDURAL STATES: Chronic | ICD-10-CM

## 2023-07-27 DIAGNOSIS — Z92.21 PERSONAL HISTORY OF ANTINEOPLASTIC CHEMOTHERAPY: Chronic | ICD-10-CM

## 2023-08-07 ENCOUNTER — APPOINTMENT (OUTPATIENT)
Dept: HEMATOLOGY ONCOLOGY | Facility: CLINIC | Age: 63
End: 2023-08-07
Payer: MEDICAID

## 2023-08-07 ENCOUNTER — APPOINTMENT (OUTPATIENT)
Dept: INFUSION THERAPY | Facility: HOSPITAL | Age: 63
End: 2023-08-07

## 2023-08-07 ENCOUNTER — RESULT REVIEW (OUTPATIENT)
Age: 63
End: 2023-08-07

## 2023-08-07 ENCOUNTER — RX RENEWAL (OUTPATIENT)
Age: 63
End: 2023-08-07

## 2023-08-07 VITALS
SYSTOLIC BLOOD PRESSURE: 134 MMHG | RESPIRATION RATE: 16 BRPM | OXYGEN SATURATION: 99 % | WEIGHT: 144.82 LBS | DIASTOLIC BLOOD PRESSURE: 81 MMHG | HEART RATE: 48 BPM | TEMPERATURE: 96.6 F | BODY MASS INDEX: 26.49 KG/M2

## 2023-08-07 LAB
ALBUMIN SERPL ELPH-MCNC: 4.8 G/DL — SIGNIFICANT CHANGE UP (ref 3.3–5)
ALP SERPL-CCNC: 70 U/L — SIGNIFICANT CHANGE UP (ref 40–120)
ALT FLD-CCNC: 11 U/L — SIGNIFICANT CHANGE UP (ref 10–45)
ANION GAP SERPL CALC-SCNC: 10 MMOL/L — SIGNIFICANT CHANGE UP (ref 5–17)
AST SERPL-CCNC: 26 U/L — SIGNIFICANT CHANGE UP (ref 10–40)
BASOPHILS # BLD AUTO: 0.04 K/UL — SIGNIFICANT CHANGE UP (ref 0–0.2)
BASOPHILS NFR BLD AUTO: 0.9 % — SIGNIFICANT CHANGE UP (ref 0–2)
BILIRUB SERPL-MCNC: 0.2 MG/DL — SIGNIFICANT CHANGE UP (ref 0.2–1.2)
BUN SERPL-MCNC: 12 MG/DL — SIGNIFICANT CHANGE UP (ref 7–23)
CALCIUM SERPL-MCNC: 9.3 MG/DL — SIGNIFICANT CHANGE UP (ref 8.4–10.5)
CHLORIDE SERPL-SCNC: 100 MMOL/L — SIGNIFICANT CHANGE UP (ref 96–108)
CO2 SERPL-SCNC: 25 MMOL/L — SIGNIFICANT CHANGE UP (ref 22–31)
CREAT SERPL-MCNC: 0.55 MG/DL — SIGNIFICANT CHANGE UP (ref 0.5–1.3)
EGFR: 103 ML/MIN/1.73M2 — SIGNIFICANT CHANGE UP
EOSINOPHIL # BLD AUTO: 0.16 K/UL — SIGNIFICANT CHANGE UP (ref 0–0.5)
EOSINOPHIL NFR BLD AUTO: 3.5 % — SIGNIFICANT CHANGE UP (ref 0–6)
GLUCOSE SERPL-MCNC: 106 MG/DL — HIGH (ref 70–99)
HCT VFR BLD CALC: 35.2 % — SIGNIFICANT CHANGE UP (ref 34.5–45)
HGB BLD-MCNC: 11.7 G/DL — SIGNIFICANT CHANGE UP (ref 11.5–15.5)
IMM GRANULOCYTES NFR BLD AUTO: 0.2 % — SIGNIFICANT CHANGE UP (ref 0–0.9)
LYMPHOCYTES # BLD AUTO: 1.83 K/UL — SIGNIFICANT CHANGE UP (ref 1–3.3)
LYMPHOCYTES # BLD AUTO: 40 % — SIGNIFICANT CHANGE UP (ref 13–44)
MCHC RBC-ENTMCNC: 28.7 PG — SIGNIFICANT CHANGE UP (ref 27–34)
MCHC RBC-ENTMCNC: 33.2 G/DL — SIGNIFICANT CHANGE UP (ref 32–36)
MCV RBC AUTO: 86.3 FL — SIGNIFICANT CHANGE UP (ref 80–100)
MONOCYTES # BLD AUTO: 0.52 K/UL — SIGNIFICANT CHANGE UP (ref 0–0.9)
MONOCYTES NFR BLD AUTO: 11.4 % — SIGNIFICANT CHANGE UP (ref 2–14)
NEUTROPHILS # BLD AUTO: 2.02 K/UL — SIGNIFICANT CHANGE UP (ref 1.8–7.4)
NEUTROPHILS NFR BLD AUTO: 44 % — SIGNIFICANT CHANGE UP (ref 43–77)
NRBC # BLD: 0 /100 WBCS — SIGNIFICANT CHANGE UP (ref 0–0)
PLATELET # BLD AUTO: 259 K/UL — SIGNIFICANT CHANGE UP (ref 150–400)
POTASSIUM SERPL-MCNC: 4.2 MMOL/L — SIGNIFICANT CHANGE UP (ref 3.5–5.3)
POTASSIUM SERPL-SCNC: 4.2 MMOL/L — SIGNIFICANT CHANGE UP (ref 3.5–5.3)
PROT SERPL-MCNC: 7.3 G/DL — SIGNIFICANT CHANGE UP (ref 6–8.3)
RBC # BLD: 4.08 M/UL — SIGNIFICANT CHANGE UP (ref 3.8–5.2)
RBC # FLD: 13.3 % — SIGNIFICANT CHANGE UP (ref 10.3–14.5)
SODIUM SERPL-SCNC: 134 MMOL/L — LOW (ref 135–145)
WBC # BLD: 4.58 K/UL — SIGNIFICANT CHANGE UP (ref 3.8–10.5)
WBC # FLD AUTO: 4.58 K/UL — SIGNIFICANT CHANGE UP (ref 3.8–10.5)

## 2023-08-07 PROCEDURE — 99214 OFFICE O/P EST MOD 30 MIN: CPT

## 2023-08-07 NOTE — REVIEW OF SYSTEMS
[Negative] : Allergic/Immunologic [Confused] : no confusion [Fainting] : no fainting [FreeTextEntry9] : general bony pains-patient states pre-dated cancer diagnosis

## 2023-08-07 NOTE — ASSESSMENT
[FreeTextEntry1] : CBC with diff., CMP, bone scan report reviewed. #1) 62 year old female with history of T2N0M0 (2.2cm), Stage IIA RIGHT breast triple negative invasive ductal carcinoma, s/p adjuvant TC chemotherapy and RT-2011.  4/25/2022 - S/P bilateral mastectomies with left axillary sentinel node biopsy on 4/25/22. Pathology revealed: 1) RIGHT Breast: focal ADH 2) LEFT Breast: Invasive poorly differentiated ductal carcinoma (1.8 cm), DCIS, 2 benign left axillary lymph nodes (one of which was a sentinel LN).  ER 0%; [place] 0%; HER 2 + by FISH. Completed Taxol portion of treatment regimen 8/31/22. Completed 1 year Kanjinti 7/2023. Clinically AZALEA. Last echocardiogram 5/4/2023-LVEF 60-65%. Next echocardiogram ordered.  #2) H/O leukopenia-clinically secondary to prior chemotherapy. Continue to monitor CBC with differential.  #3) vascular access-patient consents to port maintenance while it remains in.  Patient was given the opportunity to ask questions.  Her questions have been answered to her apparent satisfaction at this time.  She expressed her understanding and willingness to comply with recommended follow-up.  -->POF/labs today; POF 2 months and 4 months; RTO 4 months.

## 2023-08-07 NOTE — CONSULT LETTER
[Dear  ___] : Dear  [unfilled], [Courtesy Letter:] : I had the pleasure of seeing your patient, [unfilled], in my office today. [Please see my note below.] : Please see my note below. [Sincerely,] : Sincerely, [Consult Closing:] : Thank you very much for allowing me to participate in the care of this patient.  If you have any questions, please do not hesitate to contact me. [FreeTextEntry3] : Linnette Ortega MD

## 2023-08-07 NOTE — HISTORY OF PRESENT ILLNESS
[Disease: _____________________] : Disease: [unfilled] [T: ___] : T[unfilled] [N: ___] : N[unfilled] [AJCC Stage: ____] : AJCC Stage: [unfilled] [de-identified] : 2011-T2N0M0 (2.2cm), Stage IIA RIGHT breast triple negative invasive ductal carcinoma with Taylor score 8 to 9. S/P right lumpectomy (Dr. Keyon Amezquita)--> adjuvant chemotherapy with taxotere and cytoxan for 4 cycles--> IMRT with 5000cGy in 25 fractions followed by boost to the cavity of 1000cGy in 5 fractions (11/9/11-12/21/11 with Dr. Veena Walls).   12/28/21-Bilateral mammo/US showed left breast 2-3:00 4cmfn 1.4cm irregular mass suspicious of malignancy, US core biopsy recommended; grouped heterogenous calcifications at right breast 10:00 anterior depth also suspicious for malignancy, bx recommended, BIRADS4.   1/31/2022-S/P left breast 3:00 4cmfn US guided biopsy which showed invasive poorly differentiated ductal carcinoma, taylor score 8/9 (3+3+2), invasive tumor at least 0.8cm, microcalcifications present, no LVI. ER negative <1%, MS negative <1%, HER2 2+ IHC, FISH+.   2/14/2022-MRI breast-left breast 3:00 biopsy proven cancer associated with 1.4cm mass enhancement;adjacent contiguous large area of linerar nonmassenhancement seen extending from biopsy site into retroareolar/central/posterior left breast up to 9.4cm, 2 site MRI biopsy rec.; [id number].  3/1/2022-MRI biopsy of both areas showed fibrocystic changes including sclerosing adenosis   2/15/2022-CT C/A/P-showed multiple mildly enlarged mediastinal and hilar LNs, increased in size compard to7/10/2020.  Same day bone scan negative for osseous metastasis.   3/2/2022-PET scan-showed known left breast cancer,adjacent 4.4cm hematoma, indeterminate FDG avid left supraclavicular, mediastinal and hilar LAD new and/or enlarged since 7/10/2020, mediastinal and hilar LNs symmetric distribution suggests a benign etiology;left supraclavicular LN access to US guided biopsy; subcm minimallyFDGavid left axillary LN nonspecific, may be biopsied.   3/15/2022-Left supraclavicular LN US guided FNA was negative for malignant cells.   Saw Dr. Anmol Sanford pulmonary on 4/11/22. Followup after surgery to assess the mediastinal LNs-felt unlikely related to breast cancer. S/P Dr. Sanford - biopsy negative for malignancy; + granulomatous inflammation  4/25/2022 - S/P bilateral mastectomies with left axillary sentinel node biopsy on 4/25/22. Pathology revealed: 1) RIGHT Breast: focal ADH 2) LEFT Breast: Invasive poorly differentiated ductal carcinoma (1.8 cm), DCIS, 2 benign left axillary lymph nodes (one of which was a sentinel LN).  ER 0%; [place] 0%; HER 2 + by FISH.  6/9/2022-Taxol/Kanjinti begun. Taxol completed 8/31/2022. 1 Year Kanjinti completed 7/2023.  [de-identified] : Invasive poorly differentiated ductal carcinoma [de-identified] : ER-/MI-/Her 2+ [de-identified] : 11/2019-My Risk genetic testing negative. BRCA 1/BRCA 2 negative.\par  \par  Patient under the oncology care of Dr. Abernathy through 7/2022.\par  \par  Sister leukemia dx'd age 40. Mother had multiple myeloma. Brother stomach cancer age 68. Daughter with CLL age 30. Paternal 2nd cousin breast cancer age 49. Paternal 2nd cousin liver cancer age 40 had hepatitis. Family history is negative for cancer of the ovary, endometrium, prostate, pancreatic and melanoma. \par  The patient is not of Ashkenazi ethnic background. \par  \par   [de-identified] : Tired. Taking diuretic per PCP for pedal edema. Aide and patient's  with her today. No fevers. No current pulmonary complaints. No N/V/D. No hematuria, dysuria.  Daughter Shobha (is a nurse at [employer], not working currently).  Sees endocrinologist ([facility]), Dr.Halis Brewster-plans to start either Zometa or Prolia for her osteoporosis.

## 2023-08-22 ENCOUNTER — RX RENEWAL (OUTPATIENT)
Age: 63
End: 2023-08-22

## 2023-09-05 PROBLEM — Z90.13 S/P MASTECTOMY, BILATERAL: Status: ACTIVE | Noted: 2023-03-02

## 2023-09-07 ENCOUNTER — RX RENEWAL (OUTPATIENT)
Age: 63
End: 2023-09-07

## 2023-09-07 ENCOUNTER — APPOINTMENT (OUTPATIENT)
Dept: SURGICAL ONCOLOGY | Facility: CLINIC | Age: 63
End: 2023-09-07
Payer: MEDICAID

## 2023-09-07 VITALS
DIASTOLIC BLOOD PRESSURE: 73 MMHG | OXYGEN SATURATION: 99 % | WEIGHT: 14 LBS | BODY MASS INDEX: 2.58 KG/M2 | SYSTOLIC BLOOD PRESSURE: 137 MMHG | RESPIRATION RATE: 16 BRPM | HEIGHT: 62 IN | HEART RATE: 61 BPM

## 2023-09-07 DIAGNOSIS — Z90.13 ACQUIRED ABSENCE OF BILATERAL BREASTS AND NIPPLES: ICD-10-CM

## 2023-09-07 PROCEDURE — 99214 OFFICE O/P EST MOD 30 MIN: CPT

## 2023-09-07 NOTE — ASSESSMENT
[FreeTextEntry1] : IMP:   She is s/p Bilateral mastectomies with left axillary sentinel node biopsy on 4/25/22.   Pathology revealed: 1) RIGHT Breast: focal ADH 2) LEFT Breast: Invasive poorly differentiated ductal carcinoma (1.8 cm), DCIS, 2 benign left axillary lymph nodes (one of which was a sentinel node), negative margins.  T1cN0, ER-HI-HER2 equivocal (POSITIVE by in situ hybridization). Adjuvant chemotherapy w/ Dr. Linnette Ortega Herceptin completed 6/7/23   PLAN: RTO Q4 months excision of excess skin as an ambulatory patient

## 2023-09-07 NOTE — PHYSICAL EXAM
[Normal] : supple, no neck mass and thyroid not enlarged [Normal Supraclavicular Lymph Nodes] : normal supraclavicular lymph nodes [Normal Axillary Lymph Nodes] : normal axillary lymph nodes [Normal] : oriented to person, place and time, with appropriate affect [de-identified] : excess tissue lateral and medial aspect of right mastectomy scar and lateral aspect of left mastectomy scar

## 2023-09-07 NOTE — HISTORY OF PRESENT ILLNESS
[de-identified] : ARI LOYA  is a 63 year old female here for a follow-up visit, s/p Bilateral mastectomies with left axillary sentinel node biopsy on 4/25/22.  In 2011, pt was diagnosed with T2N0M0 (2.2cm), stage IIA RIGHT breast triple negative invasive ductal carcinoma with [place] score 8 to 9.  She underwent lumpectomy Dr. Keyon Amezquita and then received adjuvant chemotherapy with taxotere and cytoxan for 4 cycles.  She then received IMRT with 5000cGy in 25 fractions followed by boost to the cavity of 1000cGy in 5 fractions 11/9/11-12/21/11 with Dr. Veena Walls.   Pt underwent left breast 3:00 4cmfn US guided biopsy on 1/31/22 which showed invasive poorly differentiated ductal carcinoma, [place] score 8/9 (3+3+2), invasive tumor at least 0.8cm, micro calcifications present, no LVI.  ER-/[place]-, HER2 equivocal pending CISH.   MRI show an extensive area of enhancement area the biopsy site, CT (CAP) shows mediastinal adenopathy. Subsequent biopsies of the breast and the supraclavicular lymph node were negative. BRCA: no significant mutation. Plan for bilateral mastectomies & axillary sentinel node biopsy  She is s/p bilateral mastectomies with left axillary sentinel node biopsy on 4/25/22.  Pathology revealed: 1) RIGHT Breast: focal ADH 2) LEFT Breast: Invasive poorly differentiated ductal carcinoma (1.8 cm), DCIS, 2 benign left axillary lymph nodes (one of which was a sentinel node), negative margins.  T1cN0, ER-[place]-HER2 equivocal (POSITIVE by in situ hybridization).  Post-op had left breast hematoma that was drained in office and was noted to be resorbing when seen by my colleague, Dr Fatima.  Hematoma re-absorbing cellulitis on left chest wall 7/25/22 improving, gave Duricef 500 mg BID   She completed Herceoptin on 6/7/23.   She complaint today of excess chest wall skin. Denies any lumps in the chest wall

## 2023-09-28 ENCOUNTER — OUTPATIENT (OUTPATIENT)
Dept: OUTPATIENT SERVICES | Facility: HOSPITAL | Age: 63
LOS: 1 days | Discharge: HOME | End: 2023-09-28

## 2023-09-28 DIAGNOSIS — Z90.710 ACQUIRED ABSENCE OF BOTH CERVIX AND UTERUS: Chronic | ICD-10-CM

## 2023-09-28 DIAGNOSIS — Z92.21 PERSONAL HISTORY OF ANTINEOPLASTIC CHEMOTHERAPY: Chronic | ICD-10-CM

## 2023-09-28 DIAGNOSIS — Z98.890 OTHER SPECIFIED POSTPROCEDURAL STATES: Chronic | ICD-10-CM

## 2023-09-28 DIAGNOSIS — C50.919 MALIGNANT NEOPLASM OF UNSPECIFIED SITE OF UNSPECIFIED FEMALE BREAST: ICD-10-CM

## 2023-10-09 ENCOUNTER — APPOINTMENT (OUTPATIENT)
Dept: INFUSION THERAPY | Facility: HOSPITAL | Age: 63
End: 2023-10-09

## 2023-10-10 ENCOUNTER — NON-APPOINTMENT (OUTPATIENT)
Age: 63
End: 2023-10-10

## 2023-10-18 ENCOUNTER — APPOINTMENT (OUTPATIENT)
Dept: SURGICAL ONCOLOGY | Facility: AMBULATORY SURGERY CENTER | Age: 63
End: 2023-10-18

## 2023-10-20 ENCOUNTER — APPOINTMENT (OUTPATIENT)
Dept: CARDIOLOGY | Facility: CLINIC | Age: 63
End: 2023-10-20

## 2023-10-26 NOTE — H&P PST ADULT - INTERPRETER NAME
Review of Systems   Constitutional:  Positive for appetite change (doesnt have a good appetite) and fatigue (ongoing). Negative for fever. HENT:  Positive for voice change (gravely in the morning at times). Negative for hearing loss, tinnitus and trouble swallowing. Eyes: Negative. Negative for photophobia, pain and visual disturbance. Respiratory: Negative. Negative for shortness of breath. Cardiovascular: Negative. Negative for palpitations. Gastrointestinal: Negative. Negative for nausea and vomiting. Endocrine: Negative. Negative for cold intolerance. Genitourinary: Negative. Negative for dysuria, frequency and urgency. Musculoskeletal:  Positive for gait problem (Shuffles feet but has gotten better). Negative for back pain, myalgias and neck pain. Balance Issues at times     Skin: Negative. Negative for rash. Allergic/Immunologic: Negative. Neurological:  Positive for dizziness (When getting up too quick), tremors (Left hand a little bit) and weakness (General Weakness). Negative for seizures, syncope, facial asymmetry, speech difficulty, light-headedness, numbness and headaches. Hematological:  Bruises/bleeds easily (Bruise). Psychiatric/Behavioral:  Positive for confusion (At times). Negative for hallucinations and sleep disturbance. Memory Issues , has good days and bad days     All other systems reviewed and are negative. Shobha [facility] [phone]

## 2023-10-27 ENCOUNTER — APPOINTMENT (OUTPATIENT)
Dept: INFUSION THERAPY | Facility: HOSPITAL | Age: 63
End: 2023-10-27

## 2023-11-06 ENCOUNTER — RX RENEWAL (OUTPATIENT)
Age: 63
End: 2023-11-06

## 2023-11-10 ENCOUNTER — APPOINTMENT (OUTPATIENT)
Dept: CARDIOLOGY | Facility: CLINIC | Age: 63
End: 2023-11-10
Payer: MEDICAID

## 2023-11-10 VITALS
BODY MASS INDEX: 26.37 KG/M2 | OXYGEN SATURATION: 100 % | WEIGHT: 143.3 LBS | TEMPERATURE: 96.8 F | HEART RATE: 56 BPM | DIASTOLIC BLOOD PRESSURE: 66 MMHG | HEIGHT: 62 IN | SYSTOLIC BLOOD PRESSURE: 168 MMHG

## 2023-11-10 DIAGNOSIS — N64.59 OTHER SIGNS AND SYMPTOMS IN BREAST: ICD-10-CM

## 2023-11-10 DIAGNOSIS — I10 ESSENTIAL (PRIMARY) HYPERTENSION: ICD-10-CM

## 2023-11-10 DIAGNOSIS — Z01.810 ENCOUNTER FOR PREPROCEDURAL CARDIOVASCULAR EXAMINATION: ICD-10-CM

## 2023-11-10 DIAGNOSIS — Z92.3 PERSONAL HISTORY OF IRRADIATION: ICD-10-CM

## 2023-11-10 DIAGNOSIS — Z01.812 ENCOUNTER FOR PREPROCEDURAL LABORATORY EXAMINATION: ICD-10-CM

## 2023-11-10 DIAGNOSIS — I87.2 VENOUS INSUFFICIENCY (CHRONIC) (PERIPHERAL): ICD-10-CM

## 2023-11-10 DIAGNOSIS — Z71.2 PERSON CONSULTING FOR EXPLANATION OF EXAMINATION OR TEST FINDINGS: ICD-10-CM

## 2023-11-10 DIAGNOSIS — F41.8 OTHER SPECIFIED ANXIETY DISORDERS: ICD-10-CM

## 2023-11-10 DIAGNOSIS — R10.11 RIGHT UPPER QUADRANT PAIN: ICD-10-CM

## 2023-11-10 DIAGNOSIS — Z01.419 ENCOUNTER FOR GYNECOLOGICAL EXAMINATION (GENERAL) (ROUTINE) W/OUT ABNORMAL FINDINGS: ICD-10-CM

## 2023-11-10 DIAGNOSIS — Z92.21 PERSONAL HISTORY OF ANTINEOPLASTIC CHEMOTHERAPY: ICD-10-CM

## 2023-11-10 DIAGNOSIS — K13.79 OTHER LESIONS OF ORAL MUCOSA: ICD-10-CM

## 2023-11-10 DIAGNOSIS — R22.1 LOCALIZED SWELLING, MASS AND LUMP, NECK: ICD-10-CM

## 2023-11-10 DIAGNOSIS — Z87.39 PERSONAL HISTORY OF OTHER DISEASES OF THE MUSCULOSKELETAL SYSTEM AND CONNECTIVE TISSUE: ICD-10-CM

## 2023-11-10 PROCEDURE — 93010 ELECTROCARDIOGRAM REPORT: CPT

## 2023-11-10 PROCEDURE — 93000 ELECTROCARDIOGRAM COMPLETE: CPT

## 2023-11-10 PROCEDURE — 99214 OFFICE O/P EST MOD 30 MIN: CPT | Mod: 25

## 2023-11-10 RX ORDER — IRON HEME POLYPEPTIDE/FOLIC AC 12-1MG
125 MCG TABLET ORAL
Qty: 60 | Refills: 0 | Status: COMPLETED | COMMUNITY
Start: 2022-11-06 | End: 2023-11-10

## 2023-11-10 RX ORDER — DEXTRAN, HYPROMELLOSE 1; 3 MG/ML; MG/ML
0.1-0.3 SOLUTION/ DROPS OPHTHALMIC
Qty: 12 | Refills: 0 | Status: COMPLETED | COMMUNITY
Start: 2022-10-31 | End: 2023-11-10

## 2023-11-10 RX ORDER — ALBUTEROL SULFATE 90 UG/1
108 (90 BASE) INHALANT RESPIRATORY (INHALATION)
Qty: 1 | Refills: 3 | Status: COMPLETED | COMMUNITY
Start: 2023-07-07 | End: 2023-11-10

## 2023-11-27 ENCOUNTER — NON-APPOINTMENT (OUTPATIENT)
Age: 63
End: 2023-11-27

## 2023-11-27 RX ORDER — PANTOPRAZOLE 40 MG/1
40 TABLET, DELAYED RELEASE ORAL DAILY
Qty: 90 | Refills: 1 | Status: ACTIVE | COMMUNITY

## 2023-11-27 RX ORDER — CHROMIUM 200 MCG
TABLET ORAL DAILY
Refills: 0 | Status: DISCONTINUED | COMMUNITY
End: 2023-11-27

## 2023-11-27 RX ORDER — ATENOLOL 25 MG/1
25 TABLET ORAL DAILY
Refills: 0 | Status: ACTIVE | COMMUNITY

## 2023-11-27 RX ORDER — OMEPRAZOLE 40 MG/1
40 CAPSULE, DELAYED RELEASE ORAL
Qty: 90 | Refills: 2 | Status: DISCONTINUED | COMMUNITY
End: 2023-11-27

## 2023-11-27 RX ORDER — LIDOCAINE AND PRILOCAINE 25; 25 MG/G; MG/G
2.5-2.5 CREAM TOPICAL
Qty: 30 | Refills: 1 | Status: DISCONTINUED | COMMUNITY
Start: 2022-06-02 | End: 2023-11-27

## 2023-11-27 RX ORDER — SPIRONOLACTONE 25 MG/1
25 TABLET ORAL
Qty: 90 | Refills: 3 | Status: ACTIVE | COMMUNITY

## 2023-11-27 RX ORDER — ATENOLOL 50 MG/1
50 TABLET ORAL DAILY
Qty: 30 | Refills: 6 | Status: DISCONTINUED | COMMUNITY
Start: 2022-10-17 | End: 2023-11-27

## 2023-11-27 RX ORDER — ONDANSETRON 8 MG/1
8 TABLET ORAL EVERY 8 HOURS
Qty: 20 | Refills: 2 | Status: DISCONTINUED | COMMUNITY
Start: 2022-06-02 | End: 2023-11-27

## 2023-11-27 RX ORDER — PITAVASTATIN CALCIUM 2.09 MG/1
2 TABLET, FILM COATED ORAL DAILY
Refills: 0 | Status: ACTIVE | COMMUNITY

## 2023-11-27 RX ORDER — CYCLOBENZAPRINE HYDROCHLORIDE 5 MG/1
5 TABLET, FILM COATED ORAL
Qty: 30 | Refills: 0 | Status: DISCONTINUED | COMMUNITY
Start: 2023-04-05 | End: 2023-11-27

## 2023-11-30 ENCOUNTER — RX RENEWAL (OUTPATIENT)
Age: 63
End: 2023-11-30

## 2023-12-06 ENCOUNTER — OUTPATIENT (OUTPATIENT)
Dept: OUTPATIENT SERVICES | Facility: HOSPITAL | Age: 63
LOS: 1 days | Discharge: HOME | End: 2023-12-06
Payer: MEDICAID

## 2023-12-06 DIAGNOSIS — C50.919 MALIGNANT NEOPLASM OF UNSPECIFIED SITE OF UNSPECIFIED FEMALE BREAST: ICD-10-CM

## 2023-12-06 DIAGNOSIS — Z98.890 OTHER SPECIFIED POSTPROCEDURAL STATES: Chronic | ICD-10-CM

## 2023-12-06 DIAGNOSIS — Z90.710 ACQUIRED ABSENCE OF BOTH CERVIX AND UTERUS: Chronic | ICD-10-CM

## 2023-12-06 DIAGNOSIS — Z92.21 PERSONAL HISTORY OF ANTINEOPLASTIC CHEMOTHERAPY: Chronic | ICD-10-CM

## 2023-12-18 PROBLEM — Z85.3 HISTORY OF MALIGNANT NEOPLASM OF BREAST: Status: RESOLVED | Noted: 2022-05-20 | Resolved: 2023-12-18

## 2023-12-21 ENCOUNTER — RESULT REVIEW (OUTPATIENT)
Age: 63
End: 2023-12-21

## 2023-12-21 ENCOUNTER — APPOINTMENT (OUTPATIENT)
Dept: HEMATOLOGY ONCOLOGY | Facility: CLINIC | Age: 63
End: 2023-12-21
Payer: MEDICAID

## 2023-12-21 ENCOUNTER — APPOINTMENT (OUTPATIENT)
Dept: INFUSION THERAPY | Facility: HOSPITAL | Age: 63
End: 2023-12-21

## 2023-12-21 VITALS
OXYGEN SATURATION: 100 % | BODY MASS INDEX: 26.17 KG/M2 | DIASTOLIC BLOOD PRESSURE: 78 MMHG | WEIGHT: 143.08 LBS | HEART RATE: 57 BPM | RESPIRATION RATE: 16 BRPM | SYSTOLIC BLOOD PRESSURE: 123 MMHG | TEMPERATURE: 98 F

## 2023-12-21 DIAGNOSIS — Z45.2 ENCOUNTER FOR ADJUSTMENT AND MANAGEMENT OF VASCULAR ACCESS DEVICE: ICD-10-CM

## 2023-12-21 DIAGNOSIS — Z85.3 PERSONAL HISTORY OF MALIGNANT NEOPLASM OF BREAST: ICD-10-CM

## 2023-12-21 LAB
ALBUMIN SERPL ELPH-MCNC: 4.3 G/DL — SIGNIFICANT CHANGE UP (ref 3.3–5)
ALBUMIN SERPL ELPH-MCNC: 4.3 G/DL — SIGNIFICANT CHANGE UP (ref 3.3–5)
ALP SERPL-CCNC: 67 U/L — SIGNIFICANT CHANGE UP (ref 40–120)
ALP SERPL-CCNC: 67 U/L — SIGNIFICANT CHANGE UP (ref 40–120)
ALT FLD-CCNC: 10 U/L — SIGNIFICANT CHANGE UP (ref 10–45)
ALT FLD-CCNC: 10 U/L — SIGNIFICANT CHANGE UP (ref 10–45)
ANION GAP SERPL CALC-SCNC: 10 MMOL/L — SIGNIFICANT CHANGE UP (ref 5–17)
ANION GAP SERPL CALC-SCNC: 10 MMOL/L — SIGNIFICANT CHANGE UP (ref 5–17)
AST SERPL-CCNC: 21 U/L — SIGNIFICANT CHANGE UP (ref 10–40)
AST SERPL-CCNC: 21 U/L — SIGNIFICANT CHANGE UP (ref 10–40)
BASOPHILS # BLD AUTO: 0.03 K/UL — SIGNIFICANT CHANGE UP (ref 0–0.2)
BASOPHILS # BLD AUTO: 0.03 K/UL — SIGNIFICANT CHANGE UP (ref 0–0.2)
BASOPHILS NFR BLD AUTO: 0.7 % — SIGNIFICANT CHANGE UP (ref 0–2)
BASOPHILS NFR BLD AUTO: 0.7 % — SIGNIFICANT CHANGE UP (ref 0–2)
BILIRUB SERPL-MCNC: 0.2 MG/DL — SIGNIFICANT CHANGE UP (ref 0.2–1.2)
BILIRUB SERPL-MCNC: 0.2 MG/DL — SIGNIFICANT CHANGE UP (ref 0.2–1.2)
BUN SERPL-MCNC: 10 MG/DL — SIGNIFICANT CHANGE UP (ref 7–23)
BUN SERPL-MCNC: 10 MG/DL — SIGNIFICANT CHANGE UP (ref 7–23)
CALCIUM SERPL-MCNC: 8.9 MG/DL — SIGNIFICANT CHANGE UP (ref 8.4–10.5)
CALCIUM SERPL-MCNC: 8.9 MG/DL — SIGNIFICANT CHANGE UP (ref 8.4–10.5)
CHLORIDE SERPL-SCNC: 97 MMOL/L — SIGNIFICANT CHANGE UP (ref 96–108)
CHLORIDE SERPL-SCNC: 97 MMOL/L — SIGNIFICANT CHANGE UP (ref 96–108)
CO2 SERPL-SCNC: 24 MMOL/L — SIGNIFICANT CHANGE UP (ref 22–31)
CO2 SERPL-SCNC: 24 MMOL/L — SIGNIFICANT CHANGE UP (ref 22–31)
CREAT SERPL-MCNC: 0.54 MG/DL — SIGNIFICANT CHANGE UP (ref 0.5–1.3)
CREAT SERPL-MCNC: 0.54 MG/DL — SIGNIFICANT CHANGE UP (ref 0.5–1.3)
EGFR: 103 ML/MIN/1.73M2 — SIGNIFICANT CHANGE UP
EGFR: 103 ML/MIN/1.73M2 — SIGNIFICANT CHANGE UP
EOSINOPHIL # BLD AUTO: 0.08 K/UL — SIGNIFICANT CHANGE UP (ref 0–0.5)
EOSINOPHIL # BLD AUTO: 0.08 K/UL — SIGNIFICANT CHANGE UP (ref 0–0.5)
EOSINOPHIL NFR BLD AUTO: 1.8 % — SIGNIFICANT CHANGE UP (ref 0–6)
EOSINOPHIL NFR BLD AUTO: 1.8 % — SIGNIFICANT CHANGE UP (ref 0–6)
GLUCOSE SERPL-MCNC: 106 MG/DL — HIGH (ref 70–99)
GLUCOSE SERPL-MCNC: 106 MG/DL — HIGH (ref 70–99)
HCT VFR BLD CALC: 33.4 % — LOW (ref 34.5–45)
HCT VFR BLD CALC: 33.4 % — LOW (ref 34.5–45)
HGB BLD-MCNC: 11.5 G/DL — SIGNIFICANT CHANGE UP (ref 11.5–15.5)
HGB BLD-MCNC: 11.5 G/DL — SIGNIFICANT CHANGE UP (ref 11.5–15.5)
IMM GRANULOCYTES NFR BLD AUTO: 0.2 % — SIGNIFICANT CHANGE UP (ref 0–0.9)
IMM GRANULOCYTES NFR BLD AUTO: 0.2 % — SIGNIFICANT CHANGE UP (ref 0–0.9)
LYMPHOCYTES # BLD AUTO: 1.81 K/UL — SIGNIFICANT CHANGE UP (ref 1–3.3)
LYMPHOCYTES # BLD AUTO: 1.81 K/UL — SIGNIFICANT CHANGE UP (ref 1–3.3)
LYMPHOCYTES # BLD AUTO: 40 % — SIGNIFICANT CHANGE UP (ref 13–44)
LYMPHOCYTES # BLD AUTO: 40 % — SIGNIFICANT CHANGE UP (ref 13–44)
MCHC RBC-ENTMCNC: 29.2 PG — SIGNIFICANT CHANGE UP (ref 27–34)
MCHC RBC-ENTMCNC: 29.2 PG — SIGNIFICANT CHANGE UP (ref 27–34)
MCHC RBC-ENTMCNC: 34.4 G/DL — SIGNIFICANT CHANGE UP (ref 32–36)
MCHC RBC-ENTMCNC: 34.4 G/DL — SIGNIFICANT CHANGE UP (ref 32–36)
MCV RBC AUTO: 84.8 FL — SIGNIFICANT CHANGE UP (ref 80–100)
MCV RBC AUTO: 84.8 FL — SIGNIFICANT CHANGE UP (ref 80–100)
MONOCYTES # BLD AUTO: 0.43 K/UL — SIGNIFICANT CHANGE UP (ref 0–0.9)
MONOCYTES # BLD AUTO: 0.43 K/UL — SIGNIFICANT CHANGE UP (ref 0–0.9)
MONOCYTES NFR BLD AUTO: 9.5 % — SIGNIFICANT CHANGE UP (ref 2–14)
MONOCYTES NFR BLD AUTO: 9.5 % — SIGNIFICANT CHANGE UP (ref 2–14)
NEUTROPHILS # BLD AUTO: 2.16 K/UL — SIGNIFICANT CHANGE UP (ref 1.8–7.4)
NEUTROPHILS # BLD AUTO: 2.16 K/UL — SIGNIFICANT CHANGE UP (ref 1.8–7.4)
NEUTROPHILS NFR BLD AUTO: 47.8 % — SIGNIFICANT CHANGE UP (ref 43–77)
NEUTROPHILS NFR BLD AUTO: 47.8 % — SIGNIFICANT CHANGE UP (ref 43–77)
NRBC # BLD: 0 /100 WBCS — SIGNIFICANT CHANGE UP (ref 0–0)
NRBC # BLD: 0 /100 WBCS — SIGNIFICANT CHANGE UP (ref 0–0)
PLATELET # BLD AUTO: 256 K/UL — SIGNIFICANT CHANGE UP (ref 150–400)
PLATELET # BLD AUTO: 256 K/UL — SIGNIFICANT CHANGE UP (ref 150–400)
POTASSIUM SERPL-MCNC: 4.3 MMOL/L — SIGNIFICANT CHANGE UP (ref 3.5–5.3)
POTASSIUM SERPL-MCNC: 4.3 MMOL/L — SIGNIFICANT CHANGE UP (ref 3.5–5.3)
POTASSIUM SERPL-SCNC: 4.3 MMOL/L — SIGNIFICANT CHANGE UP (ref 3.5–5.3)
POTASSIUM SERPL-SCNC: 4.3 MMOL/L — SIGNIFICANT CHANGE UP (ref 3.5–5.3)
PROT SERPL-MCNC: 7 G/DL — SIGNIFICANT CHANGE UP (ref 6–8.3)
PROT SERPL-MCNC: 7 G/DL — SIGNIFICANT CHANGE UP (ref 6–8.3)
RBC # BLD: 3.94 M/UL — SIGNIFICANT CHANGE UP (ref 3.8–5.2)
RBC # BLD: 3.94 M/UL — SIGNIFICANT CHANGE UP (ref 3.8–5.2)
RBC # FLD: 13.3 % — SIGNIFICANT CHANGE UP (ref 10.3–14.5)
RBC # FLD: 13.3 % — SIGNIFICANT CHANGE UP (ref 10.3–14.5)
SODIUM SERPL-SCNC: 132 MMOL/L — LOW (ref 135–145)
SODIUM SERPL-SCNC: 132 MMOL/L — LOW (ref 135–145)
WBC # BLD: 4.52 K/UL — SIGNIFICANT CHANGE UP (ref 3.8–10.5)
WBC # BLD: 4.52 K/UL — SIGNIFICANT CHANGE UP (ref 3.8–10.5)
WBC # FLD AUTO: 4.52 K/UL — SIGNIFICANT CHANGE UP (ref 3.8–10.5)
WBC # FLD AUTO: 4.52 K/UL — SIGNIFICANT CHANGE UP (ref 3.8–10.5)

## 2023-12-21 PROCEDURE — 99213 OFFICE O/P EST LOW 20 MIN: CPT

## 2023-12-21 NOTE — CONSULT LETTER
[Dear  ___] : Dear  [unfilled], [Courtesy Letter:] : I had the pleasure of seeing your patient, [unfilled], in my office today. [Please see my note below.] : Please see my note below. [Consult Closing:] : Thank you very much for allowing me to participate in the care of this patient.  If you have any questions, please do not hesitate to contact me. [Sincerely,] : Sincerely, [FreeTextEntry3] : Linnette Ortega MD

## 2023-12-21 NOTE — REVIEW OF SYSTEMS
[Negative] : Allergic/Immunologic [Joint Pain] : joint pain [Confused] : no confusion [Fainting] : no fainting [FreeTextEntry9] : general bony pains-patient has stated pre-dated cancer diagnosis

## 2023-12-21 NOTE — PHYSICAL EXAM
[Fully active, able to carry on all pre-disease performance without restriction] : Status 0 - Fully active, able to carry on all pre-disease performance without restriction [Normal] : affect appropriate [de-identified] : no CW nodularity appreciated

## 2023-12-21 NOTE — HISTORY OF PRESENT ILLNESS
[Disease: _____________________] : Disease: [unfilled] [T: ___] : T[unfilled] [N: ___] : N[unfilled] [AJCC Stage: ____] : AJCC Stage: [unfilled] [de-identified] : 2011-T2N0M0 (2.2cm), Stage IIA RIGHT breast triple negative invasive ductal carcinoma with Taylor score 8 to 9. S/P right lumpectomy (Dr. Keyon Amezquita)--> adjuvant chemotherapy with taxotere and cytoxan for 4 cycles--> IMRT with 5000cGy in 25 fractions followed by boost to the cavity of 1000cGy in 5 fractions (11/9/11-12/21/11 with Dr. Veena Walls).   12/28/21-Bilateral mammo/US showed left breast 2-3:00 4cmfn 1.4cm irregular mass suspicious of malignancy, US core biopsy recommended; grouped heterogenous calcifications at right breast 10:00 anterior depth also suspicious for malignancy, bx recommended, BIRADS4.   1/31/2022-S/P left breast 3:00 4cmfn US guided biopsy which showed invasive poorly differentiated ductal carcinoma, taylor score 8/9 (3+3+2), invasive tumor at least 0.8cm, microcalcifications present, no LVI. ER negative <1%, [place] negative <1%, HER2 2+ IHC, FISH+.   2/14/2022-MRI breast-left breast 3:00 biopsy proven cancer associated with 1.4cm mass enhancement;adjacent contiguous large area of linerar nonmassenhancement seen extending from biopsy site into retroareolar/central/posterior left breast up to 9.4cm, 2 site MRI biopsy rec.; [id number].  3/1/2022-MRI biopsy of both areas showed fibrocystic changes including sclerosing adenosis   2/15/2022-CT C/A/P-showed multiple mildly enlarged mediastinal and hilar LNs, increased in size compard to7/10/2020.  Same day bone scan negative for osseous metastasis.   3/2/2022-PET scan-showed known left breast cancer,adjacent 4.4cm hematoma, indeterminate FDG avid left supraclavicular, mediastinal and hilar LAD new and/or enlarged since 7/10/2020, mediastinal and hilar LNs symmetric distribution suggests a benign etiology;left supraclavicular LN access to US guided biopsy; subcm minimallyFDGavid left axillary LN nonspecific, may be biopsied.   3/15/2022-Left supraclavicular LN US guided FNA was negative for malignant cells.   Saw Dr. Anmol Sanford pulmonary on 4/11/22. Followup after surgery to assess the mediastinal LNs-felt unlikely related to breast cancer. S/P Dr. Sanford - biopsy negative for malignancy; + granulomatous inflammation  4/25/2022 - S/P bilateral mastectomies with left axillary sentinel node biopsy on 4/25/22. Pathology revealed: 1) RIGHT Breast: focal ADH 2) LEFT Breast: Invasive poorly differentiated ductal carcinoma (1.8 cm), DCIS, 2 benign left axillary lymph nodes (one of which was a sentinel LN).  ER 0%; [place] 0%; HER 2 + by FISH.  6/9/2022-Taxol/Kanjinti begun. Taxol completed 8/31/2022. 1 Year Kanjinti completed 7/2023.  [de-identified] : Invasive poorly differentiated ductal carcinoma [de-identified] : ER-/MD-/Her 2+ [de-identified] : 11/2019-My Risk genetic testing negative. BRCA 1/BRCA 2 negative.\par  \par  Patient under the oncology care of Dr. Abernathy through 7/2022.\par  \par  Sister leukemia dx'd age 40. Mother had multiple myeloma. Brother stomach cancer age 68. Daughter with CLL age 30. Paternal 2nd cousin breast cancer age 49. Paternal 2nd cousin liver cancer age 40 had hepatitis. Family history is negative for cancer of the ovary, endometrium, prostate, pancreatic and melanoma. \par  The patient is not of Ashkenazi ethnic background. \par  \par   [de-identified] : Bony aches-not new. Diffuse arthralgias-wishes to see a rheumatologist. Taking diuretic per PCP for pedal edema. No fevers. No current pulmonary complaints. No N/V/D. No hematuria, dysuria.  Daughter Shobha (is a nurse at [employer], not working currently)-present today.  Sees endocrinologist ([facility]), Dr.Halis Brewster-plans to start either Zometa or Prolia for her osteoporosis.

## 2023-12-21 NOTE — ASSESSMENT
[FreeTextEntry1] : Lab work, 9/7/23 surgery note, 11/10/23 cardiology note reviewed. #1) 63 year old female with history of T2N0M0 (2.2cm), Stage IIA RIGHT breast triple negative invasive ductal carcinoma, s/p adjuvant TC chemotherapy and RT-2011.  4/25/2022 - S/P bilateral mastectomies with left axillary sentinel node biopsy on 4/25/22. Pathology revealed: 1) RIGHT Breast: focal ADH 2) LEFT Breast: Invasive poorly differentiated ductal carcinoma (1.8 cm), DCIS, 2 benign left axillary lymph nodes (one of which was a sentinel LN).  ER 0%; [place] 0%; HER 2 + by FISH. Completed Taxol portion of treatment regimen 8/31/22. Completed 1 year Kanjinti 7/2023. --Clinically AZALEA. Echocardiogram 5/4/2023-LVEF 60-65%. Now following with cardiology Dr. Jennings. Next echocardiogram ordered.  #2) H/O leukopenia-clinically secondary to prior chemotherapy. Continue to monitor CBC with differential.  #3) vascular access-patient consents to port maintenance while it remains in.  #4) referral made to rheumatology for arthritis, at patient request.  Patient was given the opportunity to ask questions. Her questions have been answered to her apparent satisfaction at this time. She expressed her understanding and willingness to comply with recommended follow-up.  -->POF/labs today; POF Q 2 months; RTO 4 months.

## 2023-12-26 ENCOUNTER — NON-APPOINTMENT (OUTPATIENT)
Age: 63
End: 2023-12-26

## 2024-01-03 ENCOUNTER — RX RENEWAL (OUTPATIENT)
Age: 64
End: 2024-01-03

## 2024-01-05 ENCOUNTER — APPOINTMENT (OUTPATIENT)
Dept: PULMONOLOGY | Facility: CLINIC | Age: 64
End: 2024-01-05
Payer: MEDICAID

## 2024-01-05 VITALS
HEIGHT: 62 IN | HEART RATE: 66 BPM | OXYGEN SATURATION: 98 % | SYSTOLIC BLOOD PRESSURE: 111 MMHG | BODY MASS INDEX: 27.05 KG/M2 | WEIGHT: 147 LBS | DIASTOLIC BLOOD PRESSURE: 64 MMHG

## 2024-01-05 PROCEDURE — 99214 OFFICE O/P EST MOD 30 MIN: CPT | Mod: 95

## 2024-01-05 NOTE — REASON FOR VISIT
[Follow-Up] : a follow-up visit [TextBox_44] : via video call- SOB, ?asthma, allergies, GERD, untreated ELVIN, abnormal CT of the chest (adenopathy)

## 2024-01-05 NOTE — HISTORY OF PRESENT ILLNESS
[Home] : at home, [unfilled] , at the time of the visit. [Medical Office: ([facility])___] : at the medical office located in  [Verbal consent obtained from patient] : the patient, [unfilled] [TextBox_4] : Ms. LOYA is a 63 year old female originally [place] from with a history of non smoking, breast cancer originally on the right 2011, s/p lumpectomy with radiation and chemo, arthritis, HTN, ELVIN, newly diagnosed left sided breast tumor (intraductal carcinoma) 2022 presenting to the office today via video call for follow-up pulmonary evaluation. Her chief complaint is  -she notes she has a URI at this time and she's coughing. She's on the mend -she notes she's using the CPAP and feeling like she's being choked with the face mask -she notes having chest pain at this time. She doesn't know if it's musculoskeletal -she notes using the CPAP 3-4 hours per night  -she denies any headaches, nausea, emesis, fever, chills, sweats, chest pressure, wheezing, palpitations, diarrhea, constipation, dysphagia, vertigo, arthralgias, myalgias, leg swelling, itchy eyes, itchy ears, heartburn, reflux, or sour taste in the mouth.

## 2024-01-05 NOTE — ADDENDUM
[FreeTextEntry1] : Documented by Aubrie Mccray acting as a scribe for Dr. Justice Mcdonald on 01/05/2024. All medical record entries made by the Scribe were at my, Dr. Justice Mcdonald's, direction and personally dictated by me on 01/05/2024. I have reviewed the chart and agree that the record accurately reflects my personal performance of the history, physical exam, assessment and plan. I have also personally directed, reviewed, and agree with the discharge instructions.

## 2024-01-05 NOTE — ASSESSMENT
[FreeTextEntry1] : Ms. LOYA is a 63 year old female originally [place] from with a history of non smoking, breast cancer originally on the right 2011, s/p lumpectomy with radiation and chemo, arthritis, HTN, ELVIN, newly diagnosed left sided breast tumor (intraductal carcinoma) 2022- SOB, ?asthma, allergies, GERD, ELVIN, abnormal CT of the chest (adenopathy) c/w sarcoidosis active reflux (still) - intolerant with current CPAP mask  Her shortness of breath is multifactorial due to: -poor mechanics of breathing -out of shape /overweight -pulmonary disease  -mild asthma -?cardiac disease  problem 1:mild asthma (quiet) -Breo Ellipta 200 at 1 inhalation QD or Symbicort 160 2 inhalations BID -Symbicort 160 2 BID  -Asthma is believed to be caused by inherited (genetic) and environmental factor, but its exact cause is unknown. Asthma may be triggered by allergens, lung infections, or irritants in the air. Asthma triggers are different for each person -Inhaler technique reviewed as well as oral hygiene techniques reviewed with patient. Avoidance of cold air, extremes of temperature, rescue inhaler should be used before exercise. Order of medication reviewed with patient. Recommended use of a cool mist humidifier in the bedroom.  problem 2:allergies/sinus -Blood work to include: asthma panel, food IgE panel, IgE level, eosinophil level, vitamin D level (NC)  -Environmental measures for allergies were encouraged including mattress and pillow covers, air purifier, and environmental controls.  problem 3:GERD (active) (still) -Pepcid 40 mg QHS -Reglan 5 mg pre meal qHS -continue Omeprazole 40 mg QAM, pre-meal, dinner  -Rule of 2s: avoid eating too much, eating too late, eating too spicy, eating two hours before bed. -Things to avoid including overeating, spicy foods, tight clothing, eating within three hours of bed, this list is not all inclusive. -For treatment of reflux, possible options discussed including diet control, H2 blockers, PPIs, as well as coating motility agents discussed as treatment options. Timing of meals and proximity of last meal to sleep were discussed. If symptoms persist, a formal gastrointestinal evaluation is needed.  Problem 4:ELVIN (elevated Mallampati class) -recommended DD because she is intolerant of CPAP (Fan/Tobin) -s/p Home sleep study (AHI = 16, Lowest O2 saturation = 77) -using CPAP- mask issues- getting 3-4 hours of sleep -using APAP setting of 4-20 cm H2O ([employer]) -Rx for [employer] FFM  Sleep apnea is associated with adverse clinical consequences which can affect most organ systems. Cardiovascular disease risk includes arrhythmias, atrial fibrillation, hypertension, coronary artery disease, and stroke. Metabolic disorders include diabetes type 2, non-alcoholic fatty liver disease. Mood disorder especially depression; and cognitive decline especially in the elderly. Associations with chronic reflux/Louis's esophagus some but not all inclusive. -Reasons include arousal consistent with hypopnea; respiratory events most prominent in REM sleep or supine position; therefore sleep staging and body position are important for accurate diagnosis and estimation of AHI.  Problem 5: Abnormal CT (adenopathy that dates back to 2020) (?sarcoidosis less likely lymphoma and even less likely breast cancer) -s/p ACE -s/p ESR -complete bronchoscopy with EBUS with Dr Herzog (will not interfere with surgery) c/w sarcoidosis -s/p opthalmology f/u  CAT scans are the only radiological modality to identify abnormalities w/in the lungs with regards to nodules/masses/lymph nodes. Risks, benefits were reviewed in detail. The guidelines for abnormalities include follow up CT scans at various intervals which could range from 6 weeks to 1 year intervals. If there is a change for the worse then consideration for a biopsy will be considered if you are a candidate. Second opinion evaluation with a thoracic surgeon or an interventional radiologist could be offered.  Problem 6: Sarcoidosis -Sarcoidosis is a medical mystery and can present with very serious illness or little consequence. The disease can affect any organ including skin, liver, lymph glands, spleen, eyes, nervous system, musculoskeletal system, heart, brain, kidneys, and including the lungs. Pulmonary sarcoidosis can cause loss of lung volume and abnormal lung stiffness. This disease is characterized by presence of granulomas, small areas of inflamed cells. Sarcoidosis patients should have regular cardiac and eye examinations as well as regular PFTs.  problem 7: cardiac disease -recommended to continue to follow up with Cardiologist (Michaela)  problem 8: poor breathing mechanics -Proper breathing techniques were reviewed with an emphasis of exhalation. Patient instructed to breath in for 1 second and out for four seconds. Patient was encouraged to not talk while walking.  problem 9: out of shape / overweight -Recommended Devyn Watson book on 10 day Detox -Weight loss, exercise, and diet control were discussed and are highly encouraged. Treatment options were given such as, aqua therapy, and contacting a nutritionist. Recommended to use the elliptical, stationary bike, less use of treadmill. Mindful eating was explained to the patient Obesity is associated with worsening asthma, shortness of breath, and potential for cardiac disease, diabetes, and other underlying medical conditions  problem 10: health maintenance -recommended yearly flu shot after October 15 2022 refused -recommended strep pneumonia vaccines: Prevnar-13 vaccine, followed by Pneumo vaccine 23 one year following after 65 -recommended early intervention for Upper Respiratory Infections (URIs) -recommended regular osteoporosis evaluations -recommended early dermatological evaluations -recommended after the age of 50 to the age of 70, colonoscopy every 5 years  F/P in 3 months She is encouraged to call with any changes, concerns, or questions

## 2024-01-05 NOTE — PROCEDURE
[FreeTextEntry1] : CPAP compliance data revealed usage 90% of  the time (27/30 days) 30% of days, she slept over 4 hours,  AHI 1.1

## 2024-01-12 RX ORDER — RAMIPRIL 2.5 MG/1
2.5 CAPSULE ORAL
Qty: 90 | Refills: 3 | Status: ACTIVE | COMMUNITY
Start: 2023-08-07 | End: 1900-01-01

## 2024-01-31 ENCOUNTER — RX RENEWAL (OUTPATIENT)
Age: 64
End: 2024-01-31

## 2024-01-31 RX ORDER — GABAPENTIN 300 MG/1
300 CAPSULE ORAL
Qty: 30 | Refills: 0 | Status: ACTIVE | COMMUNITY
Start: 2023-06-16 | End: 1900-01-01

## 2024-02-02 ENCOUNTER — OUTPATIENT (OUTPATIENT)
Dept: OUTPATIENT SERVICES | Facility: HOSPITAL | Age: 64
LOS: 1 days | Discharge: HOME | End: 2024-02-02

## 2024-02-02 DIAGNOSIS — Z98.890 OTHER SPECIFIED POSTPROCEDURAL STATES: Chronic | ICD-10-CM

## 2024-02-02 DIAGNOSIS — Z92.21 PERSONAL HISTORY OF ANTINEOPLASTIC CHEMOTHERAPY: Chronic | ICD-10-CM

## 2024-02-02 DIAGNOSIS — C50.919 MALIGNANT NEOPLASM OF UNSPECIFIED SITE OF UNSPECIFIED FEMALE BREAST: ICD-10-CM

## 2024-02-02 DIAGNOSIS — Z90.710 ACQUIRED ABSENCE OF BOTH CERVIX AND UTERUS: Chronic | ICD-10-CM

## 2024-02-14 ENCOUNTER — APPOINTMENT (OUTPATIENT)
Dept: INFUSION THERAPY | Facility: HOSPITAL | Age: 64
End: 2024-02-14

## 2024-02-14 ENCOUNTER — RESULT REVIEW (OUTPATIENT)
Age: 64
End: 2024-02-14

## 2024-02-14 LAB
ALBUMIN SERPL ELPH-MCNC: 4.3 G/DL — SIGNIFICANT CHANGE UP (ref 3.3–5)
ALP SERPL-CCNC: 90 U/L — SIGNIFICANT CHANGE UP (ref 40–120)
ALT FLD-CCNC: 12 U/L — SIGNIFICANT CHANGE UP (ref 10–45)
ANION GAP SERPL CALC-SCNC: 12 MMOL/L — SIGNIFICANT CHANGE UP (ref 5–17)
AST SERPL-CCNC: 17 U/L — SIGNIFICANT CHANGE UP (ref 10–40)
BASOPHILS # BLD AUTO: 0.02 K/UL — SIGNIFICANT CHANGE UP (ref 0–0.2)
BASOPHILS NFR BLD AUTO: 0.3 % — SIGNIFICANT CHANGE UP (ref 0–2)
BILIRUB SERPL-MCNC: 0.2 MG/DL — SIGNIFICANT CHANGE UP (ref 0.2–1.2)
BUN SERPL-MCNC: 14 MG/DL — SIGNIFICANT CHANGE UP (ref 7–23)
CALCIUM SERPL-MCNC: 9.1 MG/DL — SIGNIFICANT CHANGE UP (ref 8.4–10.5)
CHLORIDE SERPL-SCNC: 97 MMOL/L — SIGNIFICANT CHANGE UP (ref 96–108)
CO2 SERPL-SCNC: 26 MMOL/L — SIGNIFICANT CHANGE UP (ref 22–31)
CREAT SERPL-MCNC: 0.56 MG/DL — SIGNIFICANT CHANGE UP (ref 0.5–1.3)
EGFR: 102 ML/MIN/1.73M2 — SIGNIFICANT CHANGE UP
EOSINOPHIL # BLD AUTO: 0.23 K/UL — SIGNIFICANT CHANGE UP (ref 0–0.5)
EOSINOPHIL NFR BLD AUTO: 3.5 % — SIGNIFICANT CHANGE UP (ref 0–6)
GLUCOSE SERPL-MCNC: 154 MG/DL — HIGH (ref 70–99)
HCT VFR BLD CALC: 35.7 % — SIGNIFICANT CHANGE UP (ref 34.5–45)
HGB BLD-MCNC: 12.1 G/DL — SIGNIFICANT CHANGE UP (ref 11.5–15.5)
IMM GRANULOCYTES NFR BLD AUTO: 1.1 % — HIGH (ref 0–0.9)
LYMPHOCYTES # BLD AUTO: 2.18 K/UL — SIGNIFICANT CHANGE UP (ref 1–3.3)
LYMPHOCYTES # BLD AUTO: 32.8 % — SIGNIFICANT CHANGE UP (ref 13–44)
MCHC RBC-ENTMCNC: 29.2 PG — SIGNIFICANT CHANGE UP (ref 27–34)
MCHC RBC-ENTMCNC: 33.9 G/DL — SIGNIFICANT CHANGE UP (ref 32–36)
MCV RBC AUTO: 86 FL — SIGNIFICANT CHANGE UP (ref 80–100)
MONOCYTES # BLD AUTO: 0.53 K/UL — SIGNIFICANT CHANGE UP (ref 0–0.9)
MONOCYTES NFR BLD AUTO: 8 % — SIGNIFICANT CHANGE UP (ref 2–14)
NEUTROPHILS # BLD AUTO: 3.61 K/UL — SIGNIFICANT CHANGE UP (ref 1.8–7.4)
NEUTROPHILS NFR BLD AUTO: 54.3 % — SIGNIFICANT CHANGE UP (ref 43–77)
NRBC # BLD: 0 /100 WBCS — SIGNIFICANT CHANGE UP (ref 0–0)
PLATELET # BLD AUTO: 292 K/UL — SIGNIFICANT CHANGE UP (ref 150–400)
POTASSIUM SERPL-MCNC: 3.7 MMOL/L — SIGNIFICANT CHANGE UP (ref 3.5–5.3)
POTASSIUM SERPL-SCNC: 3.7 MMOL/L — SIGNIFICANT CHANGE UP (ref 3.5–5.3)
PROT SERPL-MCNC: 6.9 G/DL — SIGNIFICANT CHANGE UP (ref 6–8.3)
RBC # BLD: 4.15 M/UL — SIGNIFICANT CHANGE UP (ref 3.8–5.2)
RBC # FLD: 13.8 % — SIGNIFICANT CHANGE UP (ref 10.3–14.5)
SODIUM SERPL-SCNC: 135 MMOL/L — SIGNIFICANT CHANGE UP (ref 135–145)
WBC # BLD: 6.64 K/UL — SIGNIFICANT CHANGE UP (ref 3.8–10.5)
WBC # FLD AUTO: 6.64 K/UL — SIGNIFICANT CHANGE UP (ref 3.8–10.5)

## 2024-02-28 ENCOUNTER — APPOINTMENT (OUTPATIENT)
Dept: CARDIOLOGY | Facility: CLINIC | Age: 64
End: 2024-02-28
Payer: MEDICAID

## 2024-02-28 PROCEDURE — 93356 MYOCRD STRAIN IMG SPCKL TRCK: CPT

## 2024-02-28 PROCEDURE — 93306 TTE W/DOPPLER COMPLETE: CPT

## 2024-03-29 ENCOUNTER — RESULT REVIEW (OUTPATIENT)
Age: 64
End: 2024-03-29

## 2024-03-29 ENCOUNTER — APPOINTMENT (OUTPATIENT)
Dept: RHEUMATOLOGY | Facility: CLINIC | Age: 64
End: 2024-03-29
Payer: MEDICAID

## 2024-03-29 VITALS
OXYGEN SATURATION: 98 % | WEIGHT: 145 LBS | BODY MASS INDEX: 26.68 KG/M2 | RESPIRATION RATE: 16 BRPM | DIASTOLIC BLOOD PRESSURE: 74 MMHG | SYSTOLIC BLOOD PRESSURE: 125 MMHG | HEIGHT: 62 IN | HEART RATE: 72 BPM | TEMPERATURE: 97.6 F

## 2024-03-29 DIAGNOSIS — M25.561 PAIN IN RIGHT KNEE: ICD-10-CM

## 2024-03-29 DIAGNOSIS — M25.50 PAIN IN UNSPECIFIED JOINT: ICD-10-CM

## 2024-03-29 DIAGNOSIS — D86.0 SARCOIDOSIS OF LUNG: ICD-10-CM

## 2024-03-29 DIAGNOSIS — M54.2 CERVICALGIA: ICD-10-CM

## 2024-03-29 DIAGNOSIS — M25.511 PAIN IN RIGHT SHOULDER: ICD-10-CM

## 2024-03-29 DIAGNOSIS — M25.512 PAIN IN RIGHT SHOULDER: ICD-10-CM

## 2024-03-29 DIAGNOSIS — M25.562 PAIN IN RIGHT KNEE: ICD-10-CM

## 2024-03-29 PROCEDURE — 99204 OFFICE O/P NEW MOD 45 MIN: CPT

## 2024-03-29 PROCEDURE — G2211 COMPLEX E/M VISIT ADD ON: CPT | Mod: NC,1L

## 2024-04-01 NOTE — ASSESSMENT
[FreeTextEntry1] :  1. lung sarcoid- untreated - repeat CT scan - had high dose steroid treatment in the past related to breast cancer chemotherapy 2. multiple joint pain - ariel multiple joint OA - but wwill add full rheum work-up given hx of sarcoidosis send for x-rays of bilateral shoulder, cervical spine, knees continue with gabapentin for now consider PT, injections and NSAIDS treatment after all results  patient to call in 1 week to discuss results and next steps  f/u 2-3 months

## 2024-04-01 NOTE — PHYSICAL EXAM
[General Appearance - Alert] : alert [Neck Cervical Mass (___cm)] : no neck mass was observed [General Appearance - In No Acute Distress] : in no acute distress [Neck Appearance] : the appearance of the neck was normal [Jugular Venous Distention Increased] : there was no jugular-venous distention [Thyroid Diffuse Enlargement] : the thyroid was not enlarged [Thyroid Nodule] : there were no palpable thyroid nodules [Auscultation Breath Sounds / Voice Sounds] : lungs were clear to auscultation bilaterally [Heart Rate And Rhythm] : heart rate was normal and rhythm regular [Heart Sounds] : normal S1 and S2 [Heart Sounds Gallop] : no gallops [Murmurs] : no murmurs [Heart Sounds Pericardial Friction Rub] : no pericardial rub [Edema] : there was no peripheral edema [Full Pulse] : the pedal pulses are present [Abdomen Soft] : soft [Bowel Sounds] : normal bowel sounds [Abdomen Mass (___ Cm)] : no abdominal mass palpated [Abdomen Tenderness] : non-tender [Cervical Lymph Nodes Enlarged Posterior Bilaterally] : posterior cervical [Cervical Lymph Nodes Enlarged Anterior Bilaterally] : anterior cervical [No CVA Tenderness] : no ~M costovertebral angle tenderness [Supraclavicular Lymph Nodes Enlarged Bilaterally] : supraclavicular [No Spinal Tenderness] : no spinal tenderness [Nail Clubbing] : no clubbing  or cyanosis of the fingernails [Musculoskeletal - Swelling] : no joint swelling seen [Motor Tone] : muscle strength and tone were normal [Skin Color & Pigmentation] : normal skin color and pigmentation [Skin Turgor] : normal skin turgor [FreeTextEntry1] : pain over the cervical paraspinal muscle and shoulders -all joints with full ROM - no synovitis [Oriented To Time, Place, And Person] : oriented to person, place, and time [] : no rash [Affect] : the affect was normal [Impaired Insight] : insight and judgment were intact

## 2024-04-01 NOTE — HISTORY OF PRESENT ILLNESS
[FreeTextEntry1] : patient with complains of body pain - all over for at least 3-5 years - worse over the legs and neck/upper back - pain is constant and daily  had a bilateral mastectomy - may/2023 - finished chemo on gabapentin 300 mg twice daily - with mild relief of her symptoms CT done in 2022 with mediastinal hilar adenopathy - bronchoscopy done in 06/08/2022 - biopsy consistent with sarcoidosis. patient does not recall ever being treated, but was receiving high dose steroids during chemotherapy denies any swelling or redness morning stiffness abut 10-15 minutes

## 2024-04-05 ENCOUNTER — APPOINTMENT (OUTPATIENT)
Dept: RADIOLOGY | Facility: CLINIC | Age: 64
End: 2024-04-05
Payer: MEDICAID

## 2024-04-05 ENCOUNTER — APPOINTMENT (OUTPATIENT)
Dept: CT IMAGING | Facility: CLINIC | Age: 64
End: 2024-04-05
Payer: MEDICAID

## 2024-04-05 LAB
ACE BLD-CCNC: 57 U/L
ALBUMIN SERPL ELPH-MCNC: 4.7 G/DL
ALP BLD-CCNC: 79 U/L
ALT SERPL-CCNC: 18 U/L
ANION GAP SERPL CALC-SCNC: 11 MMOL/L
AST SERPL-CCNC: 18 U/L
BASOPHILS # BLD AUTO: 0.03 K/UL
BASOPHILS NFR BLD AUTO: 0.8 %
BILIRUB SERPL-MCNC: 0.3 MG/DL
BUN SERPL-MCNC: 14 MG/DL
CALCIUM SERPL-MCNC: 9.7 MG/DL
CCP AB SER IA-ACNC: <8 UNITS
CHLORIDE SERPL-SCNC: 97 MMOL/L
CO2 SERPL-SCNC: 28 MMOL/L
CREAT SERPL-MCNC: 0.59 MG/DL
DSDNA AB SER-ACNC: <1 IU/ML
EGFR: 101 ML/MIN/1.73M2
ENA RNP AB SER IA-ACNC: 0.9 AL
ENA SCL70 IGG SER IA-ACNC: <0.2 AL
ENA SM AB SER IA-ACNC: <0.2 AL
ENA SS-A AB SER IA-ACNC: <0.2 AL
ENA SS-B AB SER IA-ACNC: <0.2 AL
EOSINOPHIL # BLD AUTO: 0.23 K/UL
EOSINOPHIL NFR BLD AUTO: 6 %
GLUCOSE SERPL-MCNC: 104 MG/DL
HBV CORE IGG+IGM SER QL: REACTIVE
HBV SURFACE AB SER QL: REACTIVE
HBV SURFACE AG SER QL: NONREACTIVE
HCT VFR BLD CALC: 38.2 %
HCV AB SER QL: NONREACTIVE
HCV S/CO RATIO: 0.21 S/CO
HGB BLD-MCNC: 12.2 G/DL
IMM GRANULOCYTES NFR BLD AUTO: 0.5 %
LYMPHOCYTES # BLD AUTO: 1.57 K/UL
LYMPHOCYTES NFR BLD AUTO: 41 %
M TB IFN-G BLD-IMP: POSITIVE
MAN DIFF?: NORMAL
MCHC RBC-ENTMCNC: 29 PG
MCHC RBC-ENTMCNC: 31.9 GM/DL
MCV RBC AUTO: 91 FL
MONOCYTES # BLD AUTO: 0.35 K/UL
MONOCYTES NFR BLD AUTO: 9.1 %
NEUTROPHILS # BLD AUTO: 1.63 K/UL
NEUTROPHILS NFR BLD AUTO: 42.6 %
PLATELET # BLD AUTO: 256 K/UL
POTASSIUM SERPL-SCNC: 4.5 MMOL/L
PROT SERPL-MCNC: 7.5 G/DL
QUANTIFERON TB PLUS MITOGEN MINUS NIL: >10 IU/ML
QUANTIFERON TB PLUS NIL: 0.04 IU/ML
QUANTIFERON TB PLUS TB1 MINUS NIL: 0.31 IU/ML
QUANTIFERON TB PLUS TB2 MINUS NIL: 0.54 IU/ML
RBC # BLD: 4.2 M/UL
RBC # FLD: 14.1 %
RF+CCP IGG SER-IMP: NEGATIVE
RHEUMATOID FACT SER QL: <10 IU/ML
SODIUM SERPL-SCNC: 135 MMOL/L
WBC # FLD AUTO: 3.83 K/UL

## 2024-04-05 PROCEDURE — 71250 CT THORAX DX C-: CPT

## 2024-04-05 PROCEDURE — 73564 X-RAY EXAM KNEE 4 OR MORE: CPT | Mod: 50

## 2024-04-05 PROCEDURE — 72050 X-RAY EXAM NECK SPINE 4/5VWS: CPT

## 2024-04-05 PROCEDURE — 73030 X-RAY EXAM OF SHOULDER: CPT | Mod: 50

## 2024-04-09 ENCOUNTER — NON-APPOINTMENT (OUTPATIENT)
Age: 64
End: 2024-04-09

## 2024-04-16 ENCOUNTER — OUTPATIENT (OUTPATIENT)
Dept: OUTPATIENT SERVICES | Facility: HOSPITAL | Age: 64
LOS: 1 days | Discharge: HOME | End: 2024-04-16

## 2024-04-16 DIAGNOSIS — Z90.710 ACQUIRED ABSENCE OF BOTH CERVIX AND UTERUS: Chronic | ICD-10-CM

## 2024-04-16 DIAGNOSIS — Z92.21 PERSONAL HISTORY OF ANTINEOPLASTIC CHEMOTHERAPY: Chronic | ICD-10-CM

## 2024-04-16 DIAGNOSIS — C50.919 MALIGNANT NEOPLASM OF UNSPECIFIED SITE OF UNSPECIFIED FEMALE BREAST: ICD-10-CM

## 2024-04-16 DIAGNOSIS — Z98.890 OTHER SPECIFIED POSTPROCEDURAL STATES: Chronic | ICD-10-CM

## 2024-04-17 PROBLEM — D72.819 LEUKOPENIA: Status: ACTIVE | Noted: 2020-09-15

## 2024-04-18 ENCOUNTER — APPOINTMENT (OUTPATIENT)
Age: 64
End: 2024-04-18
Payer: MEDICAID

## 2024-04-18 VITALS — TEMPERATURE: 97.8 F | HEIGHT: 62 IN | WEIGHT: 146 LBS | BODY MASS INDEX: 26.87 KG/M2 | OXYGEN SATURATION: 98 %

## 2024-04-18 PROCEDURE — 99204 OFFICE O/P NEW MOD 45 MIN: CPT

## 2024-04-18 NOTE — PHYSICAL EXAM
[General Appearance - Alert] : alert [General Appearance - In No Acute Distress] : in no acute distress [Sclera] : the sclera and conjunctiva were normal [Extraocular Movements] : extraocular movements were intact [Outer Ear] : the ears and nose were normal in appearance [Examination Of The Oral Cavity] : the lips and gums were normal [Neck Appearance] : the appearance of the neck was normal [] : the neck was supple [Neck Cervical Mass (___cm)] : no neck mass was observed [Respiration, Rhythm And Depth] : normal respiratory rhythm and effort [Exaggerated Use Of Accessory Muscles For Inspiration] : no accessory muscle use [Auscultation Breath Sounds / Voice Sounds] : lungs were clear to auscultation bilaterally [Heart Sounds] : normal S1 and S2 [Heart Sounds Gallop] : no gallops [Murmurs] : no murmurs [Heart Sounds Pericardial Friction Rub] : no pericardial rub [Bowel Sounds] : normal bowel sounds [Abdomen Soft] : soft [Abdomen Tenderness] : non-tender [No Palpable Adenopathy] : no palpable adenopathy [Deep Tendon Reflexes (DTR)] : deep tendon reflexes were 2+ and symmetric [Motor Exam] : the motor exam was normal [Oriented To Time, Place, And Person] : oriented to person, place, and time [Affect] : the affect was normal [FreeTextEntry1] : bilat mastectomy scars; R axillary scar; + R upper chest port with no tenderness, maryann drainiage

## 2024-04-18 NOTE — ASSESSMENT
[FreeTextEntry1] : 64 y o female with h/o breast CA, bilat mastectomies, HBP, GERD, ELVIN on CPAP, hearing loss, PVD, sarcoidosis referred for positive quantiferon. Pt emigrated from [facility] about 20 yrs. Unaware of any contact with anyone with active TB. Pt without cough, fever, CP; + generalized body aches x10 yrs, chronic H/A (front and back of head) x yrs relieved by Roxicet (Azerbaijani med with oxycodone); denies N, V, diarrhea; + constipation; no urinary tract complaints. Pt has R port in place. Has not been on any chemo since 6/23.   # + quantiferon gold from 3/29/24 indicative of latent TB. Pt has no evidence of active TB at this time. Has been off chemo since 6/23 with no clear plans to resume. However, concerned about immunosuppressive Rx to manage the Dx of sarcoidosis. I contacted Dr. Mcdonald who is planning to start pt on steroids-- appointment tomorrow. In view of concern for development of active TB on immunosuppression, Dr. Mcdonald and I agree to have pt start Rx for latent TB under his care. His recommendation is to Rx pt with daily Rifampin for the next 4 months, which he will prescribe after seeing the pt tomorrow. I explained potential adverse effects of rifampin to the patient and her daughter--including hepatotoxic and dermatologic side effects.  Pt should have CBC and LFTs followed while on rifampin.   # Hep B serology: Hep BS Ag negative, Hep BSAb positive. I reassured pt and pt's daughter that pt has no evidence of infection but has evidence of immunity.  Charts reviewed, including notes, labs, imaging; pt evaluated at the bedside in the presence of pt's daughter; discussed Dx and management of latent TB with the patient and her daughter as well as potential AE. Pt and pt's daughter encouraged to contact me with any further questions.

## 2024-04-18 NOTE — HISTORY OF PRESENT ILLNESS
[FreeTextEntry1] : Pt's daughter at the bedside as Prydeinig :  Pt is a 64 y o female with h/o breast CA, bilat mastectomies, HBP, GERD, ELVIN on CPAP, hearing loss, PVD referred for positive quantiferon drawn 3/29/24. Pt emigrated from [facility] about 20 yrs. Unaware of any contact with anyone with active TB and never had a + TB test in the past. Pt without cough, fever, CP; + generalized body aches x10 yrs, chronic H/A (front and back of head) x yrs relieved by Roxicet (Botswanan med with oxycodone); denies N, V, diarrhea; + constipation; no urinary tract complaints. Pt has R port in place. Has not been on any chemo since 6/23.   Pt's daughter also concerned about the hep B serology from 3/29/24.

## 2024-04-18 NOTE — REVIEW OF SYSTEMS
[Body Aches] : body aches [Loss Of Hearing] : hearing loss [Constipation] : constipation [As Noted in HPI] : as noted in HPI [Negative] : Psychiatric [Fever] : no fever [Chills] : no chills [Earache] : no earache [Abdominal Pain] : no abdominal pain [Vomiting] : no vomiting [Diarrhea] : no diarrhea [FreeTextEntry6] : CLARK

## 2024-04-19 ENCOUNTER — APPOINTMENT (OUTPATIENT)
Dept: PULMONOLOGY | Facility: CLINIC | Age: 64
End: 2024-04-19
Payer: MEDICAID

## 2024-04-19 VITALS
DIASTOLIC BLOOD PRESSURE: 78 MMHG | BODY MASS INDEX: 27.19 KG/M2 | HEART RATE: 62 BPM | WEIGHT: 144 LBS | OXYGEN SATURATION: 98 % | TEMPERATURE: 97.2 F | RESPIRATION RATE: 16 BRPM | HEIGHT: 61 IN | SYSTOLIC BLOOD PRESSURE: 140 MMHG

## 2024-04-19 DIAGNOSIS — R76.12 NONSPECIFIC REACTION TO CELL MEDIATED IMMUNITY MEASUREMENT OF GAMMA INTERFERON ANTIGEN RESPONSE W/OUT ACTIVE TUBERCULOSIS: ICD-10-CM

## 2024-04-19 DIAGNOSIS — R06.02 SHORTNESS OF BREATH: ICD-10-CM

## 2024-04-19 DIAGNOSIS — R59.0 LOCALIZED ENLARGED LYMPH NODES: ICD-10-CM

## 2024-04-19 PROCEDURE — 95012 NITRIC OXIDE EXP GAS DETER: CPT

## 2024-04-19 PROCEDURE — 99214 OFFICE O/P EST MOD 30 MIN: CPT | Mod: 25

## 2024-04-19 PROCEDURE — 94010 BREATHING CAPACITY TEST: CPT

## 2024-04-19 RX ORDER — RIFAMPIN 300 MG/1
300 CAPSULE ORAL
Qty: 1 | Refills: 3 | Status: ACTIVE | COMMUNITY
Start: 2024-04-19 | End: 1900-01-01

## 2024-04-20 LAB
ALBUMIN SERPL ELPH-MCNC: 4.8 G/DL
ALP BLD-CCNC: 82 U/L
ALT SERPL-CCNC: 15 U/L
AST SERPL-CCNC: 17 U/L
BILIRUB DIRECT SERPL-MCNC: 0.1 MG/DL
BILIRUB INDIRECT SERPL-MCNC: 0.1 MG/DL
BILIRUB SERPL-MCNC: 0.2 MG/DL
PROT SERPL-MCNC: 7.1 G/DL

## 2024-04-22 ENCOUNTER — APPOINTMENT (OUTPATIENT)
Dept: HEMATOLOGY ONCOLOGY | Facility: CLINIC | Age: 64
End: 2024-04-22
Payer: MEDICAID

## 2024-04-22 DIAGNOSIS — Z79.899 OTHER LONG TERM (CURRENT) DRUG THERAPY: ICD-10-CM

## 2024-04-22 DIAGNOSIS — D72.819 DECREASED WHITE BLOOD CELL COUNT, UNSPECIFIED: ICD-10-CM

## 2024-04-29 ENCOUNTER — RESULT REVIEW (OUTPATIENT)
Age: 64
End: 2024-04-29

## 2024-04-29 ENCOUNTER — APPOINTMENT (OUTPATIENT)
Dept: INFUSION THERAPY | Facility: HOSPITAL | Age: 64
End: 2024-04-29

## 2024-04-29 ENCOUNTER — APPOINTMENT (OUTPATIENT)
Dept: HEMATOLOGY ONCOLOGY | Facility: CLINIC | Age: 64
End: 2024-04-29
Payer: MEDICAID

## 2024-04-29 VITALS
HEART RATE: 65 BPM | HEIGHT: 61 IN | TEMPERATURE: 97.3 F | RESPIRATION RATE: 16 BRPM | BODY MASS INDEX: 27.3 KG/M2 | OXYGEN SATURATION: 98 % | SYSTOLIC BLOOD PRESSURE: 139 MMHG | WEIGHT: 144.62 LBS | DIASTOLIC BLOOD PRESSURE: 81 MMHG

## 2024-04-29 DIAGNOSIS — C50.919 MALIGNANT NEOPLASM OF UNSPECIFIED SITE OF UNSPECIFIED FEMALE BREAST: ICD-10-CM

## 2024-04-29 LAB
ALBUMIN SERPL ELPH-MCNC: 4.7 G/DL — SIGNIFICANT CHANGE UP (ref 3.3–5)
ALP SERPL-CCNC: 75 U/L — SIGNIFICANT CHANGE UP (ref 40–120)
ALT FLD-CCNC: 14 U/L — SIGNIFICANT CHANGE UP (ref 10–45)
ANION GAP SERPL CALC-SCNC: 11 MMOL/L — SIGNIFICANT CHANGE UP (ref 5–17)
AST SERPL-CCNC: 20 U/L — SIGNIFICANT CHANGE UP (ref 10–40)
BASOPHILS # BLD AUTO: 0.02 K/UL — SIGNIFICANT CHANGE UP (ref 0–0.2)
BASOPHILS NFR BLD AUTO: 0.5 % — SIGNIFICANT CHANGE UP (ref 0–2)
BILIRUB SERPL-MCNC: 0.3 MG/DL — SIGNIFICANT CHANGE UP (ref 0.2–1.2)
BUN SERPL-MCNC: 11 MG/DL — SIGNIFICANT CHANGE UP (ref 7–23)
CALCIUM SERPL-MCNC: 9.1 MG/DL — SIGNIFICANT CHANGE UP (ref 8.4–10.5)
CHLORIDE SERPL-SCNC: 96 MMOL/L — SIGNIFICANT CHANGE UP (ref 96–108)
CO2 SERPL-SCNC: 26 MMOL/L — SIGNIFICANT CHANGE UP (ref 22–31)
CREAT SERPL-MCNC: 0.47 MG/DL — LOW (ref 0.5–1.3)
EGFR: 106 ML/MIN/1.73M2 — SIGNIFICANT CHANGE UP
EOSINOPHIL # BLD AUTO: 0.13 K/UL — SIGNIFICANT CHANGE UP (ref 0–0.5)
EOSINOPHIL NFR BLD AUTO: 3.1 % — SIGNIFICANT CHANGE UP (ref 0–6)
GLUCOSE SERPL-MCNC: 105 MG/DL — HIGH (ref 70–99)
HCT VFR BLD CALC: 34.4 % — LOW (ref 34.5–45)
HGB BLD-MCNC: 11.4 G/DL — LOW (ref 11.5–15.5)
IMM GRANULOCYTES NFR BLD AUTO: 0 % — SIGNIFICANT CHANGE UP (ref 0–0.9)
LYMPHOCYTES # BLD AUTO: 1.66 K/UL — SIGNIFICANT CHANGE UP (ref 1–3.3)
LYMPHOCYTES # BLD AUTO: 39.4 % — SIGNIFICANT CHANGE UP (ref 13–44)
MCHC RBC-ENTMCNC: 28.6 PG — SIGNIFICANT CHANGE UP (ref 27–34)
MCHC RBC-ENTMCNC: 33.1 G/DL — SIGNIFICANT CHANGE UP (ref 32–36)
MCV RBC AUTO: 86.4 FL — SIGNIFICANT CHANGE UP (ref 80–100)
MONOCYTES # BLD AUTO: 0.44 K/UL — SIGNIFICANT CHANGE UP (ref 0–0.9)
MONOCYTES NFR BLD AUTO: 10.5 % — SIGNIFICANT CHANGE UP (ref 2–14)
NEUTROPHILS # BLD AUTO: 1.96 K/UL — SIGNIFICANT CHANGE UP (ref 1.8–7.4)
NEUTROPHILS NFR BLD AUTO: 46.5 % — SIGNIFICANT CHANGE UP (ref 43–77)
NRBC # BLD: 0 /100 WBCS — SIGNIFICANT CHANGE UP (ref 0–0)
PLATELET # BLD AUTO: 271 K/UL — SIGNIFICANT CHANGE UP (ref 150–400)
POTASSIUM SERPL-MCNC: 4.2 MMOL/L — SIGNIFICANT CHANGE UP (ref 3.5–5.3)
POTASSIUM SERPL-SCNC: 4.2 MMOL/L — SIGNIFICANT CHANGE UP (ref 3.5–5.3)
PROT SERPL-MCNC: 7.4 G/DL — SIGNIFICANT CHANGE UP (ref 6–8.3)
RBC # BLD: 3.98 M/UL — SIGNIFICANT CHANGE UP (ref 3.8–5.2)
RBC # FLD: 13.3 % — SIGNIFICANT CHANGE UP (ref 10.3–14.5)
SODIUM SERPL-SCNC: 133 MMOL/L — LOW (ref 135–145)
WBC # BLD: 4.21 K/UL — SIGNIFICANT CHANGE UP (ref 3.8–10.5)
WBC # FLD AUTO: 4.21 K/UL — SIGNIFICANT CHANGE UP (ref 3.8–10.5)

## 2024-04-29 PROCEDURE — 99214 OFFICE O/P EST MOD 30 MIN: CPT

## 2024-04-29 PROCEDURE — G2211 COMPLEX E/M VISIT ADD ON: CPT | Mod: NC,1L

## 2024-04-29 NOTE — REVIEW OF SYSTEMS
[Lower Ext Edema] : lower extremity edema [Confused] : no confusion [Fainting] : no fainting [FreeTextEntry9] : general bony pains-patient has stated pre-dated cancer diagnosis

## 2024-04-29 NOTE — PHYSICAL EXAM
[de-identified] : +LE stockings; no calf tenderness [de-identified] : no CW nodularity appreciated [de-identified] : right axillarytenderness, ?LN ~ 1.5 cm

## 2024-04-29 NOTE — HISTORY OF PRESENT ILLNESS
[de-identified] : 2011-T2N0M0 (2.2cm), Stage IIA RIGHT breast triple negative invasive ductal carcinoma with Taylor score 8 to 9. S/P right lumpectomy (Dr. Keyon Amezquita)--> adjuvant chemotherapy with taxotere and cytoxan for 4 cycles--> IMRT with 5000cGy in 25 fractions followed by boost to the cavity of 1000cGy in 5 fractions (11/9/11-12/21/11 with Dr. Veena Walls).   12/28/21-Bilateral mammo/US showed left breast 2-3:00 4cmfn 1.4cm irregular mass suspicious of malignancy, US core biopsy recommended; grouped heterogenous calcifications at right breast 10:00 anterior depth also suspicious for malignancy, bx recommended, [id number].   1/31/2022-S/P left breast 3:00 4cmfn US guided biopsy which showed invasive poorly differentiated ductal carcinoma, taylor score 8/9 (3+3+2), invasive tumor at least 0.8cm, microcalcifications present, no LVI. ER negative <1%, WY negative <1%, HER2 2+ IHC, FISH+.   2/14/2022-MRI breast-left breast 3:00 biopsy proven cancer associated with 1.4cm mass enhancement;adjacent contiguous large area of linerar nonmassenhancement seen extending from biopsy site into retroareolar/central/posterior left breast up to 9.4cm, 2 site MRI biopsy rec.; [id number].  3/1/2022-MRI biopsy of both areas showed fibrocystic changes including sclerosing adenosis   2/15/2022-CT C/A/P-showed multiple mildly enlarged mediastinal and hilar LNs, increased in size compard to7/10/2020.  Same day bone scan negative for osseous metastasis.   3/2/2022-PET scan-showed known left breast cancer,adjacent 4.4cm hematoma, indeterminate FDG avid left supraclavicular, mediastinal and hilar LAD new and/or enlarged since 7/10/2020, mediastinal and hilar LNs symmetric distribution suggests a benign etiology;left supraclavicular LN access to US guided biopsy; subcm minimallyFDGavid left axillary LN nonspecific, may be biopsied.   3/15/2022-Left supraclavicular LN US guided FNA was negative for malignant cells.   Saw Dr. Anmol Sanford pulmonary on 4/11/22. Followup after surgery to assess the mediastinal LNs-felt unlikely related to breast cancer. S/P Dr. Sanford - biopsy negative for malignancy; + granulomatous inflammation  4/25/2022 - S/P bilateral mastectomies with left axillary sentinel node biopsy on 4/25/22. Pathology revealed: 1) RIGHT Breast: focal ADH 2) LEFT Breast: Invasive poorly differentiated ductal carcinoma (1.8 cm), DCIS, 2 benign left axillary lymph nodes (one of which was a sentinel LN).  ER 0%; WY 0%; HER 2 + by FISH.  6/9/2022-Taxol/Kanjinti begun. Taxol completed 8/31/2022. 1 Year Kanjinti completed 7/2023.  [de-identified] : Invasive poorly differentiated ductal carcinoma [de-identified] : ER-/WA-/Her 2+ [de-identified] : 11/2019-My Risk genetic testing negative. BRCA 1/BRCA 2 negative.\par  \par  Patient under the oncology care of Dr. Abernathy through 7/2022.\par  \par  Sister leukemia dx'd age 40. Mother had multiple myeloma. Brother stomach cancer age 68. Daughter with CLL age 30. Paternal 2nd cousin breast cancer age 49. Paternal 2nd cousin liver cancer age 40 had hepatitis. Family history is negative for cancer of the ovary, endometrium, prostate, pancreatic and melanoma. \par  The patient is not of Ashkenazi ethnic background. \par  \par   [de-identified] : Told has diverticulosis-no current c/o abdominal pain, fever, N/V/D. Diffuse arthralgias-wishes to see a rheumatologist-plans f/u. Taking diuretic per PCP for pedal edema. No current pulmonary complaints. No hematuria, dysuria.  Daughter Shobha (is a nurse at [employer], not working currently).  Sees endocrinologist ([facility]), Dr.Halis Brewster-was planning to start either Zometa or Prolia for her osteoporosis.

## 2024-04-29 NOTE — ASSESSMENT
[FreeTextEntry1] : Lab work, 3/29/24 rheumatology note, 1/5/24 pulmonary note reviewed. #1) T2N0M0 (2.2cm), Stage IIA RIGHT breast triple negative invasive ductal carcinoma, s/p adjuvant TC chemotherapy and RT-2011.  4/25/2022 - S/P bilateral mastectomies with left axillary sentinel node biopsy on 4/25/22. Pathology revealed: 1) RIGHT Breast: focal ADH 2) LEFT Breast: Invasive poorly differentiated ductal carcinoma (1.8 cm), DCIS, 2 benign left axillary lymph nodes (one of which was a sentinel LN).  ER 0%; ND 0%; HER 2 + by FISH. Completed Taxol portion of treatment regimen 8/31/22. Completed 1 year Kanjinti 7/2023. Echocardiogram 2/29/24-LVEF 60-69%. Now following with cardiology Dr. Jennings.   --with right axillary tenderness, possible LN-US ordered  #2) H/O leukopenia-clinically secondary to prior chemotherapy. Continue to monitor CBC with differential. Should hematologic scenario change/worsen, can consider further eval.  #3) vascular access-patient consents to port maintenance. Discussed having port removed at this point-patient wishes for port to remain in for continued vascular access as needed  Patient was given the opportunity to ask questions. Her questions have been answered to her apparent satisfaction at this time. She expressed her understanding and wishes to continue oncology follow-up.  -->POF/labs today; POF Q 2 months; RTO 6 months or earlier as needed. pending the above

## 2024-05-09 LAB
ALBUMIN SERPL ELPH-MCNC: 4.9 G/DL
ALP BLD-CCNC: 94 U/L
ALT SERPL-CCNC: 15 U/L
AST SERPL-CCNC: 19 U/L
BILIRUB DIRECT SERPL-MCNC: 0.1 MG/DL
BILIRUB INDIRECT SERPL-MCNC: 0.1 MG/DL
BILIRUB SERPL-MCNC: 0.2 MG/DL
PROT SERPL-MCNC: 7.7 G/DL

## 2024-05-27 NOTE — ED PROVIDER NOTE - LOCATION
Keep hydrated by drinking lots of liquids.  Start Medrol Dosepak and take as directed with food.  Follow-up with your primary care provider if symptoms persist.  Consider having uric acid levels drawn by your primary care provider in the future.  Advise ER visit if worse.   epigastric area

## 2024-06-06 ENCOUNTER — APPOINTMENT (OUTPATIENT)
Dept: PULMONOLOGY | Facility: CLINIC | Age: 64
End: 2024-06-06

## 2024-06-07 ENCOUNTER — APPOINTMENT (OUTPATIENT)
Dept: PULMONOLOGY | Facility: CLINIC | Age: 64
End: 2024-06-07

## 2024-06-11 LAB
ALBUMIN SERPL ELPH-MCNC: 4.4 G/DL
ALP BLD-CCNC: 121 U/L
ALT SERPL-CCNC: 35 U/L
AST SERPL-CCNC: 25 U/L
BILIRUB DIRECT SERPL-MCNC: 0.1 MG/DL
BILIRUB INDIRECT SERPL-MCNC: 0.1 MG/DL
BILIRUB SERPL-MCNC: 0.2 MG/DL
PROT SERPL-MCNC: 6.9 G/DL

## 2024-06-17 ENCOUNTER — APPOINTMENT (OUTPATIENT)
Dept: PULMONOLOGY | Facility: CLINIC | Age: 64
End: 2024-06-17
Payer: MEDICAID

## 2024-06-17 VITALS
TEMPERATURE: 97.6 F | OXYGEN SATURATION: 98 % | DIASTOLIC BLOOD PRESSURE: 68 MMHG | RESPIRATION RATE: 16 BRPM | WEIGHT: 144 LBS | BODY MASS INDEX: 27.19 KG/M2 | HEART RATE: 64 BPM | SYSTOLIC BLOOD PRESSURE: 140 MMHG | HEIGHT: 61 IN

## 2024-06-17 DIAGNOSIS — K21.9 GASTRO-ESOPHAGEAL REFLUX DISEASE W/OUT ESOPHAGITIS: ICD-10-CM

## 2024-06-17 DIAGNOSIS — Z22.7 LATENT TUBERCULOSIS: ICD-10-CM

## 2024-06-17 DIAGNOSIS — G47.33 OBSTRUCTIVE SLEEP APNEA (ADULT) (PEDIATRIC): ICD-10-CM

## 2024-06-17 DIAGNOSIS — R91.8 OTHER NONSPECIFIC ABNORMAL FINDING OF LUNG FIELD: ICD-10-CM

## 2024-06-17 DIAGNOSIS — J45.909 UNSPECIFIED ASTHMA, UNCOMPLICATED: ICD-10-CM

## 2024-06-17 PROCEDURE — 94727 GAS DIL/WSHOT DETER LNG VOL: CPT

## 2024-06-17 PROCEDURE — 95012 NITRIC OXIDE EXP GAS DETER: CPT

## 2024-06-17 PROCEDURE — 99214 OFFICE O/P EST MOD 30 MIN: CPT | Mod: 25

## 2024-06-17 PROCEDURE — ZZZZZ: CPT

## 2024-06-17 PROCEDURE — 94729 DIFFUSING CAPACITY: CPT

## 2024-06-17 PROCEDURE — 94010 BREATHING CAPACITY TEST: CPT

## 2024-06-17 NOTE — PHYSICAL EXAM
[No Acute Distress] : no acute distress [Normal Oropharynx] : normal oropharynx [III] : Mallampati Class: III [Normal Appearance] : normal appearance [No Neck Mass] : no neck mass [Normal Rate/Rhythm] : normal rate/rhythm [Normal S1, S2] : normal s1, s2 [No Murmurs] : no murmurs [No Resp Distress] : no resp distress [Clear to Auscultation Bilaterally] : clear to auscultation bilaterally [Benign] : benign [Normal Gait] : normal gait [No Clubbing] : no clubbing [No Cyanosis] : no cyanosis [FROM] : FROM [Normal Color/ Pigmentation] : normal color/ pigmentation [No Focal Deficits] : no focal deficits [Oriented x3] : oriented x3 [Normal Affect] : normal affect [Supple] : supple [No Edema] : no edema [No Rubs] : no rubs [No Gallops] : no gallops [Kyphosis] : kyphosis [Not Tender] : not tender [Soft] : soft [Normal Bowel Sounds] : normal bowel sounds [TextBox_2] : OW [TextBox_68] : I:E ratio 1:3; clear  [TextBox_105] : edema b/l LE

## 2024-06-17 NOTE — HISTORY OF PRESENT ILLNESS
[TextBox_4] : Ms. LOYA is a 64 year old female originally from [facility] from with a history of nonsmoking, breast cancer originally on the right 2011, s/p lumpectomy with radiation and chemo, arthritis, HTN, ELVIN, newly diagnosed left sided breast tumor (intraductal carcinoma) 2022 presenting to the office today for a follow-up pulmonary evaluation. Her chief complaint is  -she notes feeling generally well -she notes s/p eval with her rheumatologist, and her QuantiFERON-TB Gold was positive for TB -she notes she's dealing with constipation -she notes having a bitter taste in her mouth when eating -she notes she's fatigued. She wants to sleep all the time. She doesn't get enough sleep -she notes sleeping for 2-3 hours, and she tosses and turns the rest of the night. She's using her CPAP, but when she moves, it falls off -she notes chronic headaches  -she denies any nausea, emesis, fever, chills, sweats, chest pain, chest pressure, coughing, wheezing, palpitations, diarrhea, dysphagia, vertigo, arthralgias, myalgias, leg swelling, itchy eyes, itchy ears, heartburn, reflux.   TODAY'S VISIT 6/17/24  Ms. LOYA is a 64 year old female with a history of nonsmoking, breast cancer originally on the right 2011, s/p lumpectomy with radiation and chemo, arthritis, HTN, ELVIN, newly diagnosed left sided breast tumor (intraductal carcinoma) 2022 presenting to the office today for a follow-up pulmonary evaluation. Her chief complaint is  -reports she's in her normal state of health, no new complaints or recent illness -reports she started Rifampin 5/24 doing well  -reports she's been doing well on PAP therapy  -reports her appetite is good, weight is stable -reports GERD is controlled with rx -reports chemo completed one year ago, but still has the port  -she denies any nausea, emesis, fever, chills, sweats, chest pain, chest pressure, coughing, wheezing, palpitations, diarrhea, dysphagia, vertigo, arthralgias, myalgias, leg swelling, itchy eyes, itchy ears,

## 2024-06-17 NOTE — PROCEDURE
[FreeTextEntry1] : FENO was 5; a normal value being less than 25 Fractional exhaled nitric oxide (FENO) is regarded as a simple, noninvasive method for assessing eosinophilic airway inflammation. Produced by a variety of cells within the lung, nitric oxide (NO) concentrations are generally low in healthy individuals. However, high concentrations of NO appear to be involved in nonspecific host defense mechanisms and chronic inflammatory diseases such as asthma. The American Thoracic Society (ATS) therefore has recommended using FENO to aid in the diagnosis and monitoring of eosinophilic airway inflammation and asthma, and for identifying steroid responsive individuals whose chronic respiratory symptoms may be caused by airway inflammation.   PFTs; Spirometry, lung volumes and diffusion capacity WNL  PFTs done f/u SOB

## 2024-06-17 NOTE — ASSESSMENT
[FreeTextEntry1] : Ms. LOYA is a 64 year old female with a history of nonsmoking, breast cancer originally on the right 2011, s/p lumpectomy with radiation and chemo, arthritis, HTN, ELVIN, left-sided breast tumor (intraductal carcinoma) 2022- SOB, asthma, allergies, GERD, ELVIN, abnormal CT of the chest (adenopathy) c/w sarcoidosis, active reflux (still) - latent TB 4/2024 - stable from a pulmonary standpoint  problem 1: mild asthma (quiet) -Breo Ellipta 200 at 1 inhalation QD or Symbicort 160 2 inhalations BID -does not use -Symbicort 160 2 BID - does not use -reviewed importance of compliance with medications -Asthma is believed to be caused by inherited (genetic) and environmental factor, but its exact cause is unknown. Asthma may be triggered by allergens, lung infections, or irritants in the air. Asthma triggers are different for each person  Problem 1A: Latent TB 4/2024 -continue Rifampin 600 mg QD for 4 months -complete LFTs every 4 weeks -started Rifampin 5/24 (was waiting for approval from insurance company) -she is compliant with Rifampin -last CMP 6/10/24 (AST WNL -  U/L (range ) -CMP ordered for 7/24 -discussed the importance of staying hydrated As for the latent TB infection, 5-13% will go on to develop active disease. No gold standard test for diagnosis. The tuberculin skin test limited sensitivity and specificity, as there are false positives in people who have received BCG; false negatives in people with impaired T-cell infection. Interferon gamma release assays, more specific; similar sensitivity to TB skin test (not influenced by BCG).   problem 2: allergies/sinus- quiet -Blood work to include: asthma panel, food IgE panel, IgE level, eosinophil level, vitamin D level (NC) -Environmental measures for allergies were encouraged including mattress and pillow covers, air purifier, and environmental controls.  problem 3: GERD (active) -continue Pepcid 40 mg QHS -continue Reglan 5 mg pre meal qHS -continue Omeprazole 40 mg QAM, pre-meal, dinner - patient discontinued -recommended Reflux Gourmet - does not use -Rule of 2s: avoid eating too much, eating too late, eating too spicy, eating two hours before bed. -Things to avoid including overeating, spicy foods, tight clothing, eating within three hours of bed, this list is not all inclusive.  Problem 4: ELVIN (elevated Mallampati class) -s/p Home sleep study (AHI = 16, Lowest O2 saturation = 77) -now using CPAP- mask issues prior- getting 3-4 hours of sleep- tolerating well and benefitting -s/p APAP setting of 4-20 cm H2O ([employer]), [employer] FFM Sleep apnea is associated with adverse clinical consequences which can affect most organ systems. Cardiovascular disease risk includes arrhythmias, atrial fibrillation, hypertension, coronary artery disease, and stroke. Metabolic disorders include diabetes type 2, non-alcoholic fatty liver disease. Mood disorder especially depression; and cognitive decline especially in the elderly. Associations with chronic reflux/Louis's esophagus some but not all inclusive. -Reasons include arousal consistent with hypopnea; respiratory events most prominent in REM sleep or supine position; therefore sleep staging and body position are important for accurate diagnosis and estimation of AHI.  Problem 5: Abnormal CT (adenopathy that dates back to 2020) (c/w sarcoidosis) -s/p ACE -s/p ESR -s/p bronchoscopy with EBUS with Dr Sanford (will not interfere with surgery) c/w sarcoidosis -recommended yearly ophthalmology follow-up  -next chest CT 4/2025 -CT completed 4/5/24  IMPRESSION: Clear lungs. Stable borderline/mildly enlarged mediastinal lymph nodes.  CAT scans are the only radiological modality to identify abnormalities w/in the lungs with regards to nodules/masses/lymph nodes. Risks, benefits were reviewed in detail. The guidelines for abnormalities include follow up CT scans at various intervals which could range from 6 weeks to 1 year intervals. If there is a change for the worse then consideration for a biopsy will be considered if you are a candidate. Second opinion evaluation with a thoracic surgeon or an interventional radiologist could be offered.  Problem 6: Sarcoidosis- observation -s/p chest CT 4/2024- next 4/2025 -complete follow-up evaluation with Dr. Price al. -Sarcoidosis is a medical mystery and can present with very serious illness or little consequence. The disease can affect any organ including skin, liver, lymph glands, spleen, eyes, nervous system, musculoskeletal system, heart, brain, kidneys, and including the lungs. Pulmonary sarcoidosis can cause loss of lung volume and abnormal lung stiffness. This disease is characterized by presence of granulomas, small areas of inflamed cells. Sarcoidosis patients should have regular cardiac and eye examinations as well as regular PFTs.  problem 7: cardiac disease -recommended to continue to follow up with Cardiologist (Michaela)  problem 10: health maintenance -recommended yearly flu shot after October 15, 2024 -recommended strep pneumonia vaccines: Prevnar-13 vaccine, followed by Pneumo vaccine 23 one year following after 65 -recommended early intervention for Upper Respiratory Infections (URIs) -recommended regular osteoporosis evaluations -recommended early dermatological evaluations -recommended after the age of 50 to the age of 70, colonoscopy every 5 years  F/P in 3 months She is encouraged to call with any changes, concerns, or questions

## 2024-06-17 NOTE — REASON FOR VISIT
[Follow-Up] : a follow-up visit [Asthma] : asthma [Sleep Apnea] : sleep apnea [Sarcoidosis] : sarcoidosis

## 2024-06-20 ENCOUNTER — OUTPATIENT (OUTPATIENT)
Dept: OUTPATIENT SERVICES | Facility: HOSPITAL | Age: 64
LOS: 1 days | Discharge: HOME | End: 2024-06-20

## 2024-06-20 DIAGNOSIS — C50.919 MALIGNANT NEOPLASM OF UNSPECIFIED SITE OF UNSPECIFIED FEMALE BREAST: ICD-10-CM

## 2024-06-20 DIAGNOSIS — Z98.890 OTHER SPECIFIED POSTPROCEDURAL STATES: Chronic | ICD-10-CM

## 2024-06-20 DIAGNOSIS — Z92.21 PERSONAL HISTORY OF ANTINEOPLASTIC CHEMOTHERAPY: Chronic | ICD-10-CM

## 2024-06-20 DIAGNOSIS — Z90.710 ACQUIRED ABSENCE OF BOTH CERVIX AND UTERUS: Chronic | ICD-10-CM

## 2024-06-24 ENCOUNTER — APPOINTMENT (OUTPATIENT)
Dept: INFUSION THERAPY | Facility: HOSPITAL | Age: 64
End: 2024-06-24

## 2024-08-01 ENCOUNTER — RX RENEWAL (OUTPATIENT)
Age: 64
End: 2024-08-01

## 2024-08-16 ENCOUNTER — APPOINTMENT (OUTPATIENT)
Dept: RHEUMATOLOGY | Facility: CLINIC | Age: 64
End: 2024-08-16

## 2024-08-20 ENCOUNTER — APPOINTMENT (OUTPATIENT)
Dept: ORTHOPEDIC SURGERY | Facility: CLINIC | Age: 64
End: 2024-08-20
Payer: MEDICAID

## 2024-08-20 ENCOUNTER — OUTPATIENT (OUTPATIENT)
Dept: OUTPATIENT SERVICES | Facility: HOSPITAL | Age: 64
LOS: 1 days | Discharge: HOME | End: 2024-08-20

## 2024-08-20 DIAGNOSIS — Z98.890 OTHER SPECIFIED POSTPROCEDURAL STATES: Chronic | ICD-10-CM

## 2024-08-20 DIAGNOSIS — Z92.21 PERSONAL HISTORY OF ANTINEOPLASTIC CHEMOTHERAPY: Chronic | ICD-10-CM

## 2024-08-20 DIAGNOSIS — M23.91 UNSPECIFIED INTERNAL DERANGEMENT OF RIGHT KNEE: ICD-10-CM

## 2024-08-20 DIAGNOSIS — M23.92 UNSPECIFIED INTERNAL DERANGEMENT OF LEFT KNEE: ICD-10-CM

## 2024-08-20 DIAGNOSIS — C50.919 MALIGNANT NEOPLASM OF UNSPECIFIED SITE OF UNSPECIFIED FEMALE BREAST: ICD-10-CM

## 2024-08-20 PROCEDURE — 99204 OFFICE O/P NEW MOD 45 MIN: CPT

## 2024-08-20 RX ORDER — METHYLPREDNISOLONE 4 MG/1
4 TABLET ORAL
Qty: 1 | Refills: 0 | Status: ACTIVE | COMMUNITY
Start: 2024-08-20 | End: 1900-01-01

## 2024-08-20 NOTE — PHYSICAL EXAM
[de-identified] : General Appearance / Station: Well developed, well nourished, in no acute distress  Orientation: Oriented to person, place, and time Gait & Station: Ambulates without assistive device Neurologic: Normal leg sensation  Cardiovascular: Warm extremity  Lymphatics: No lymphedema  Generalized Ligament Laxity: Normal  Stiffness: Normal   RIGHT HIP: Range of motion: Painless  internal and external rotation of the hip. Strength: Within Normal Limits  Palpation: Nontender  at greater trochanter. Nontender  at SI joint Stinchfield: Negative  FADIR: Negative  EKEGAN: Negative   SYMPTOMATIC RIGHT KNEE: Alignment: Neutral Skin: normal Effusion: none . Quadriceps: normal . Range of motion: symmetric but painful . PF crepitus: 1+. PF apprehension: none . Patella / Patella Tendon: nontender . Lachman's: negative  Valgus @ 30: negative. Varus @ 30: negative. Posterior drawer: negative. Palpation: TENDER AT medial joint line and lateral patella facet Meniscus signs: MEDIAL JOINT LINE  LEFT HIP: Range of motion: Painless  internal and external rotation of the hip. Strength: Within Normal Limits  Palpation: Nontender  at greater trochanter. Nontender  at SI joint Stinchfield: Negative  FADIR: Negative  KEEGAN: Negative  SYMPTOMATIC LEFT KNEE: Alignment: Neutral Skin: normal Effusion: none . Quadriceps: normal . Range of motion: symmetric but painful . PF crepitus: 1+. PF apprehension: none . Patella / Patella Tendon: nontender . Lachman's: negative  Valgus @ 30: negative. Varus @ 30: negative. Posterior drawer: negative. Palpation: TENDER AT medial joint line and lateral patella facet Meniscus signs: MEDIAL JOINT LINE  [de-identified] : X-rays reviewed from [facility] which show bilateral patellofemoral arthritis.  There is no significant medial compartment narrowing

## 2024-08-20 NOTE — HISTORY OF PRESENT ILLNESS
[de-identified] : Patient presents for evaluation of bilateral knee pain for the past 3 weeks.  She has no history of injury pain is 10 and 10 in severity.  She is in doing physical therapy is also taking an NSAID for inflammation however symptoms persist.  She points to the anterior aspect of the knee as well as the medial joint line as maximal source of pain.  Previously had x-rays at [facility].  Denies fevers or chills

## 2024-08-20 NOTE — DISCUSSION/SUMMARY
[de-identified] : ARI LOYA  is an 64 year-old female who presents to the clinic with months of bilateral left greater than right knee pain.  The patient initially tried OTC medications/NSAIDs with some relief, even though short lasting. Exercise therapy has had only temporary relief.  Radiographic findings show no obvious fracture or deformity,however patient has had continued pain and is unable to return to their usual activities. Given the history along with the physical exam findings of medial joint line pain and pain with deep squat, I am concerned about a possible meniscus tear. I discussed that radiographs show the bones well but do not show the soft tissues, such as ligaments, tendons and menisci well. At this point, the patient is quite debilitated by her  pain and is unable to return to her  activities of daily living such as walking. Given the persistent symptoms, I discussed with the patient that her should continue to avoid strenuous activities while we obtain an MRI to further evaluate for an internal derangement of the knee. The office will work to obtain the MRI.   I discussed with them that I often prescribe an anti-inflammatory that should be taken once a day with meals to decrease pain and expedite symptom relief. They should not take this while also taking Aleve (Naprosyn), Motrin/ Advil (Ibuprofen), Toradol (ketoralac). They must stop taking it if they develops stomach pain, increased bleeding or bruising and they should follow-up with their primary care doctor for routine blood work including kidney function to monitor its effect. While it Is not a habit-forming substance, it should only  be taken as needed and to discontinue use once symptoms have resolved.  She is already tried this and has not appeared to be working.   In the interim, the patient should continue to walk and take anti-inflammatory medications as directed if not medically contraindicated. They will schedule follow-up for in person review of the MRI results. They know to call the office or present to the ER if they develop worsening pain, swelling, numbness, tingling, inability to use the leg.   My cumulative time spent on this patients visit included: Preparation for the visit, review of the medical records, review of pertinent diagnostic studies, examination and counseling of the patient on the above diagnosis, treatment plan and prognosis, orders of diagnostic tests, medications and/or appropriate procedures and documentation in the medical records of todays visit.  pt c/o right flank pain

## 2024-08-28 ENCOUNTER — APPOINTMENT (OUTPATIENT)
Dept: INFUSION THERAPY | Facility: HOSPITAL | Age: 64
End: 2024-08-28

## 2024-09-26 ENCOUNTER — APPOINTMENT (OUTPATIENT)
Dept: PULMONOLOGY | Facility: CLINIC | Age: 64
End: 2024-09-26
Payer: MEDICAID

## 2024-09-26 VITALS
TEMPERATURE: 97.1 F | WEIGHT: 138 LBS | RESPIRATION RATE: 16 BRPM | DIASTOLIC BLOOD PRESSURE: 80 MMHG | HEIGHT: 61 IN | HEART RATE: 68 BPM | SYSTOLIC BLOOD PRESSURE: 136 MMHG | BODY MASS INDEX: 26.06 KG/M2 | OXYGEN SATURATION: 94 %

## 2024-09-26 DIAGNOSIS — G47.33 OBSTRUCTIVE SLEEP APNEA (ADULT) (PEDIATRIC): ICD-10-CM

## 2024-09-26 DIAGNOSIS — J45.909 UNSPECIFIED ASTHMA, UNCOMPLICATED: ICD-10-CM

## 2024-09-26 DIAGNOSIS — R91.8 OTHER NONSPECIFIC ABNORMAL FINDING OF LUNG FIELD: ICD-10-CM

## 2024-09-26 DIAGNOSIS — K21.9 GASTRO-ESOPHAGEAL REFLUX DISEASE W/OUT ESOPHAGITIS: ICD-10-CM

## 2024-09-26 PROCEDURE — 99214 OFFICE O/P EST MOD 30 MIN: CPT | Mod: 25

## 2024-09-26 RX ORDER — FLUTICASONE FUROATE AND VILANTEROL TRIFENATATE 100; 25 UG/1; UG/1
100-25 POWDER RESPIRATORY (INHALATION)
Qty: 1 | Refills: 1 | Status: ACTIVE | COMMUNITY
Start: 2024-09-26 | End: 1900-01-01

## 2024-09-26 NOTE — HISTORY OF PRESENT ILLNESS
[TextBox_4] : Ms. LOYA is a 64 year old female originally from [facility] from with a history of nonsmoking, breast cancer originally on the right 2011, s/p lumpectomy with radiation and chemo, arthritis, HTN, ELVIN, newly diagnosed left sided breast tumor (intraductal carcinoma) 2022 presenting to the office today for a follow-up pulmonary evaluation. Her friend Vonnie is here to interpret for her (ROS). Her chief complaint is  -reports she is in her normal state of health, no new complaints or recent illness -reports she finished her course of Rifampin 8/2024 (took medication for 4 months) -reports back pain x 2 months -reports she went to her neurologist for back pain was prescribed pain medication but did not help scheduled to see her Neurologist next month -reports weight and appetite are stable -reports nocturia 3x -reports fatigue   -she denies any nausea, emesis, fever, chills, sweats, chest pain, chest pressure, coughing, wheezing, palpitations, diarrhea, dysphagia, vertigo, arthralgias, myalgias, leg swelling, itchy eyes, itchy ears, heartburn, reflux.

## 2024-09-26 NOTE — ASSESSMENT
[FreeTextEntry1] : Ms. LOYA is a 64 year old female originally [place] from with a history of nonsmoking, breast cancer originally on the right 2011, s/p lumpectomy with radiation and chemo, arthritis, HTN, ELVIN, left-sided breast tumor (intraductal carcinoma) 2022- SOB, ?asthma, allergies, GERD, ELVIN, abnormal CT of the chest (adenopathy) c/w sarcoidosis, active reflux (still) - finished course of Rifampin (4 months)  problem 1: mild asthma (quiet) -Breo Ellipta 200 at 1 inhalation QD or Symbicort 160 2 inhalations BID rescripted -continue Symbicort 160 2 BID -Asthma is believed to be caused by inherited (genetic) and environmental factor, but its exact cause is unknown. Asthma may be triggered by allergens, lung infections, or irritants in the air. Asthma triggers are different for each person  Problem 1A: Latent TB 4/2024 -s/p Rifampin 600 mg QD for 4 months -s/p LFTs every 4 weeks -order CMP post Rifampin As for the latent TB infection, 5-13% will go on to develop active disease. No gold standard test for diagnosis. The tuberculin skin test limited sensitivity and specificity, as there are false positives in people who have received BCG; false negatives in people with impaired T-cell infection. Interferon gamma release assays, more specific; similar sensitivity to TB skin test (not influenced by BCG).  problem 2: allergies/sinus- quiet -Blood work to include: asthma panel, food IgE panel, IgE level, eosinophil level, vitamin D level (NC) -Environmental measures for allergies were encouraged including mattress and pillow covers, air purifier, and environmental controls.  problem 3: GERD (active) -continue Pepcid 40 mg QHS -s/p Omeprazole 40 mg QAM, pre-meal, dinner -Rule of 2s: avoid eating too much, eating too late, eating too spicy, eating two hours before bed. -Things to avoid including overeating, spicy foods, tight clothing, eating within three hours of bed, this list is not all inclusive. -For treatment of reflux, possible options discussed including diet control, H2 blockers, PPIs, as well as coating motility agents discussed as treatment options. Timing of meals and proximity of last meal to sleep were discussed. If symptoms persist, a formal gastrointestinal evaluation is needed.  Problem 4: ELVIN (elevated Mallampati class) -s/p Home sleep study (AHI = 16, Lowest O2 saturation = 77) -now using CPAP- mask issues prior- getting 3-4 hours of sleep- tolerating well and benefitting Sleep apnea is associated with adverse clinical consequences which can affect most organ systems. Cardiovascular disease risk includes arrhythmias, atrial fibrillation, hypertension, coronary artery disease, and stroke. Metabolic disorders include diabetes type 2, non-alcoholic fatty liver disease. Mood disorder especially depression; and cognitive decline especially in the elderly. Associations with chronic reflux/Louis's esophagus some but not all inclusive. -Reasons include arousal consistent with hypopnea; respiratory events most prominent in REM sleep or supine position; therefore sleep staging and body position are important for accurate diagnosis and estimation of AHI.  Problem 5: Abnormal CT (adenopathy that dates back to 2020) (c/w sarcoidosis) -s/p ACE -s/p ESR -s/p bronchoscopy with EBUS with Dr Sanford (will not interfere with surgery) c/w sarcoidosis -recommended yearly ophthalmology follow-up -s/pt chest CT 4/2025 CAT scans are the only radiological modality to identify abnormalities w/in the lungs with regards to nodules/masses/lymph nodes. Risks, benefits were reviewed in detail. The guidelines for abnormalities include follow up CT scans at various intervals which could range from 6 weeks to 1 year intervals. If there is a change for the worse then consideration for a biopsy will be considered if you are a candidate. Second opinion evaluation with a thoracic surgeon or an interventional radiologist could be offered.  Problem 6: Sarcoidosis- observation -s/p chest CT 4/2024- next 4/2025 -complete follow-up evaluation with Dr. Ventura. -Sarcoidosis is a medical mystery and can present with very serious illness or little consequence. The disease can affect any organ including skin, liver, lymph glands, spleen, eyes, nervous system, musculoskeletal system, heart, brain, kidneys, and including the lungs. Pulmonary sarcoidosis can cause loss of lung volume and abnormal lung stiffness. This disease is characterized by presence of granulomas, small areas of inflamed cells. Sarcoidosis patients should have regular cardiac and eye examinations as well as regular PFTs.  problem 7: cardiac disease -recommended to continue to follow up with Cardiologist (Michaela)  problem 8: health maintenance -recommended yearly flu shot after October 15, 2024- refused -recommended strep pneumonia vaccines: Prevnar-13 vaccine, followed by Pneumo vaccine 23 one year following after 65 -recommended early intervention for Upper Respiratory Infections (URIs) -recommended regular osteoporosis evaluations -recommended early dermatological evaluations -recommended after the age of 50 to the age of 70, colonoscopy every 5 years  F/P in 3 months She is encouraged to call with any changes, concerns, or questions.

## 2024-10-01 LAB
ALBUMIN SERPL ELPH-MCNC: 4.8 G/DL
ALP BLD-CCNC: 85 U/L
ALT SERPL-CCNC: 15 U/L
ANION GAP SERPL CALC-SCNC: 16 MMOL/L
AST SERPL-CCNC: 16 U/L
BILIRUB SERPL-MCNC: 0.2 MG/DL
BUN SERPL-MCNC: 13 MG/DL
CALCIUM SERPL-MCNC: 9.7 MG/DL
CHLORIDE SERPL-SCNC: 96 MMOL/L
CO2 SERPL-SCNC: 24 MMOL/L
CREAT SERPL-MCNC: 0.69 MG/DL
EGFR: 97 ML/MIN/1.73M2
GLUCOSE SERPL-MCNC: 103 MG/DL
POTASSIUM SERPL-SCNC: 4.2 MMOL/L
PROT SERPL-MCNC: 7.3 G/DL
SODIUM SERPL-SCNC: 136 MMOL/L

## 2024-10-14 ENCOUNTER — APPOINTMENT (OUTPATIENT)
Dept: HEMATOLOGY ONCOLOGY | Facility: CLINIC | Age: 64
End: 2024-10-14

## 2024-10-19 ENCOUNTER — OUTPATIENT (OUTPATIENT)
Dept: OUTPATIENT SERVICES | Facility: HOSPITAL | Age: 64
LOS: 1 days | Discharge: HOME | End: 2024-10-19

## 2024-10-19 DIAGNOSIS — Z92.21 PERSONAL HISTORY OF ANTINEOPLASTIC CHEMOTHERAPY: Chronic | ICD-10-CM

## 2024-10-19 DIAGNOSIS — Z98.890 OTHER SPECIFIED POSTPROCEDURAL STATES: Chronic | ICD-10-CM

## 2024-10-19 DIAGNOSIS — Z90.710 ACQUIRED ABSENCE OF BOTH CERVIX AND UTERUS: Chronic | ICD-10-CM

## 2024-10-22 ENCOUNTER — NON-APPOINTMENT (OUTPATIENT)
Age: 64
End: 2024-10-22

## 2024-10-22 ENCOUNTER — APPOINTMENT (OUTPATIENT)
Dept: INFUSION THERAPY | Facility: HOSPITAL | Age: 64
End: 2024-10-22

## 2024-10-22 ENCOUNTER — APPOINTMENT (OUTPATIENT)
Dept: HEMATOLOGY ONCOLOGY | Facility: CLINIC | Age: 64
End: 2024-10-22
Payer: MEDICAID

## 2024-10-22 VITALS
SYSTOLIC BLOOD PRESSURE: 127 MMHG | DIASTOLIC BLOOD PRESSURE: 73 MMHG | WEIGHT: 136.89 LBS | HEIGHT: 61 IN | RESPIRATION RATE: 17 BRPM | TEMPERATURE: 97.2 F | OXYGEN SATURATION: 96 % | BODY MASS INDEX: 25.84 KG/M2 | HEART RATE: 56 BPM

## 2024-10-22 DIAGNOSIS — D72.819 DECREASED WHITE BLOOD CELL COUNT, UNSPECIFIED: ICD-10-CM

## 2024-10-22 DIAGNOSIS — C50.919 MALIGNANT NEOPLASM OF UNSPECIFIED SITE OF UNSPECIFIED FEMALE BREAST: ICD-10-CM

## 2024-10-22 PROCEDURE — G2211 COMPLEX E/M VISIT ADD ON: CPT | Mod: NC

## 2024-10-22 PROCEDURE — 99214 OFFICE O/P EST MOD 30 MIN: CPT

## 2024-10-28 ENCOUNTER — APPOINTMENT (OUTPATIENT)
Dept: VASCULAR SURGERY | Facility: CLINIC | Age: 64
End: 2024-10-28
Payer: MEDICAID

## 2024-10-28 VITALS
WEIGHT: 136 LBS | HEIGHT: 61 IN | HEART RATE: 62 BPM | BODY MASS INDEX: 25.68 KG/M2 | SYSTOLIC BLOOD PRESSURE: 115 MMHG | TEMPERATURE: 97.4 F | DIASTOLIC BLOOD PRESSURE: 72 MMHG

## 2024-10-28 PROCEDURE — 99203 OFFICE O/P NEW LOW 30 MIN: CPT

## 2024-11-04 ENCOUNTER — APPOINTMENT (OUTPATIENT)
Dept: ULTRASOUND IMAGING | Facility: CLINIC | Age: 64
End: 2024-11-04

## 2024-11-04 PROCEDURE — 76536 US EXAM OF HEAD AND NECK: CPT

## 2024-11-05 ENCOUNTER — APPOINTMENT (OUTPATIENT)
Dept: VASCULAR SURGERY | Facility: CLINIC | Age: 64
End: 2024-11-05
Payer: MEDICAID

## 2024-11-05 PROCEDURE — 93922 UPR/L XTREMITY ART 2 LEVELS: CPT

## 2024-11-13 ENCOUNTER — NON-APPOINTMENT (OUTPATIENT)
Age: 64
End: 2024-11-13

## 2024-11-18 ENCOUNTER — APPOINTMENT (OUTPATIENT)
Dept: OTOLARYNGOLOGY | Facility: CLINIC | Age: 64
End: 2024-11-18

## 2024-11-25 ENCOUNTER — APPOINTMENT (OUTPATIENT)
Dept: OTOLARYNGOLOGY | Facility: CLINIC | Age: 64
End: 2024-11-25
Payer: MEDICAID

## 2024-11-25 VITALS
SYSTOLIC BLOOD PRESSURE: 119 MMHG | HEIGHT: 61 IN | BODY MASS INDEX: 25.11 KG/M2 | DIASTOLIC BLOOD PRESSURE: 77 MMHG | HEART RATE: 67 BPM | WEIGHT: 133 LBS | OXYGEN SATURATION: 99 %

## 2024-11-25 DIAGNOSIS — K21.9 GASTRO-ESOPHAGEAL REFLUX DISEASE W/OUT ESOPHAGITIS: ICD-10-CM

## 2024-11-25 DIAGNOSIS — R22.1 LOCALIZED SWELLING, MASS AND LUMP, NECK: ICD-10-CM

## 2024-11-25 PROCEDURE — 31575 DIAGNOSTIC LARYNGOSCOPY: CPT

## 2024-11-25 PROCEDURE — 99204 OFFICE O/P NEW MOD 45 MIN: CPT | Mod: 25

## 2024-11-25 RX ORDER — FAMOTIDINE 20 MG/1
20 TABLET, FILM COATED ORAL DAILY
Qty: 30 | Refills: 5 | Status: ACTIVE | COMMUNITY
Start: 2024-11-25 | End: 1900-01-01

## 2024-11-26 ENCOUNTER — APPOINTMENT (OUTPATIENT)
Dept: VASCULAR SURGERY | Facility: CLINIC | Age: 64
End: 2024-11-26

## 2024-11-26 PROCEDURE — 99443: CPT

## 2024-12-17 ENCOUNTER — APPOINTMENT (OUTPATIENT)
Dept: ULTRASOUND IMAGING | Facility: IMAGING CENTER | Age: 64
End: 2024-12-17
Payer: MEDICAID

## 2024-12-17 ENCOUNTER — OUTPATIENT (OUTPATIENT)
Dept: OUTPATIENT SERVICES | Facility: HOSPITAL | Age: 64
LOS: 1 days | End: 2024-12-17
Payer: MEDICAID

## 2024-12-17 ENCOUNTER — APPOINTMENT (OUTPATIENT)
Dept: CT IMAGING | Facility: IMAGING CENTER | Age: 64
End: 2024-12-17
Payer: MEDICAID

## 2024-12-17 DIAGNOSIS — Z98.890 OTHER SPECIFIED POSTPROCEDURAL STATES: Chronic | ICD-10-CM

## 2024-12-17 DIAGNOSIS — Z00.8 ENCOUNTER FOR OTHER GENERAL EXAMINATION: ICD-10-CM

## 2024-12-17 DIAGNOSIS — Z92.21 PERSONAL HISTORY OF ANTINEOPLASTIC CHEMOTHERAPY: Chronic | ICD-10-CM

## 2024-12-17 DIAGNOSIS — Z90.710 ACQUIRED ABSENCE OF BOTH CERVIX AND UTERUS: Chronic | ICD-10-CM

## 2024-12-17 DIAGNOSIS — R22.1 LOCALIZED SWELLING, MASS AND LUMP, NECK: ICD-10-CM

## 2024-12-17 PROCEDURE — 70491 CT SOFT TISSUE NECK W/DYE: CPT

## 2024-12-17 PROCEDURE — 70491 CT SOFT TISSUE NECK W/DYE: CPT | Mod: 26

## 2024-12-17 PROCEDURE — 76536 US EXAM OF HEAD AND NECK: CPT | Mod: 26

## 2024-12-17 PROCEDURE — 76536 US EXAM OF HEAD AND NECK: CPT

## 2024-12-18 ENCOUNTER — RESULT REVIEW (OUTPATIENT)
Age: 64
End: 2024-12-18

## 2024-12-18 ENCOUNTER — APPOINTMENT (OUTPATIENT)
Dept: INFUSION THERAPY | Facility: HOSPITAL | Age: 64
End: 2024-12-18

## 2024-12-18 ENCOUNTER — APPOINTMENT (OUTPATIENT)
Dept: HEMATOLOGY ONCOLOGY | Facility: CLINIC | Age: 64
End: 2024-12-18
Payer: MEDICAID

## 2024-12-18 DIAGNOSIS — C50.919 MALIGNANT NEOPLASM OF UNSPECIFIED SITE OF UNSPECIFIED FEMALE BREAST: ICD-10-CM

## 2024-12-18 DIAGNOSIS — D72.819 DECREASED WHITE BLOOD CELL COUNT, UNSPECIFIED: ICD-10-CM

## 2024-12-18 LAB
ALBUMIN SERPL ELPH-MCNC: 4.2 G/DL — SIGNIFICANT CHANGE UP (ref 3.3–5)
ALP SERPL-CCNC: 84 U/L — SIGNIFICANT CHANGE UP (ref 40–120)
ALT FLD-CCNC: 11 U/L — SIGNIFICANT CHANGE UP (ref 10–45)
ANION GAP SERPL CALC-SCNC: 11 MMOL/L — SIGNIFICANT CHANGE UP (ref 5–17)
AST SERPL-CCNC: 19 U/L — SIGNIFICANT CHANGE UP (ref 10–40)
BASOPHILS # BLD AUTO: 0.04 K/UL — SIGNIFICANT CHANGE UP (ref 0–0.2)
BASOPHILS NFR BLD AUTO: 0.7 % — SIGNIFICANT CHANGE UP (ref 0–2)
BILIRUB SERPL-MCNC: 0.2 MG/DL — SIGNIFICANT CHANGE UP (ref 0.2–1.2)
BUN SERPL-MCNC: 10 MG/DL — SIGNIFICANT CHANGE UP (ref 7–23)
CALCIUM SERPL-MCNC: 8.8 MG/DL — SIGNIFICANT CHANGE UP (ref 8.4–10.5)
CHLORIDE SERPL-SCNC: 97 MMOL/L — SIGNIFICANT CHANGE UP (ref 96–108)
CO2 SERPL-SCNC: 21 MMOL/L — LOW (ref 22–31)
CREAT SERPL-MCNC: 0.54 MG/DL — SIGNIFICANT CHANGE UP (ref 0.5–1.3)
EGFR: 103 ML/MIN/1.73M2 — SIGNIFICANT CHANGE UP
EOSINOPHIL # BLD AUTO: 0.1 K/UL — SIGNIFICANT CHANGE UP (ref 0–0.5)
EOSINOPHIL NFR BLD AUTO: 1.8 % — SIGNIFICANT CHANGE UP (ref 0–6)
GLUCOSE SERPL-MCNC: 102 MG/DL — HIGH (ref 70–99)
HCT VFR BLD CALC: 34.7 % — SIGNIFICANT CHANGE UP (ref 34.5–45)
HGB BLD-MCNC: 11.6 G/DL — SIGNIFICANT CHANGE UP (ref 11.5–15.5)
IMM GRANULOCYTES NFR BLD AUTO: 0.2 % — SIGNIFICANT CHANGE UP (ref 0–0.9)
LYMPHOCYTES # BLD AUTO: 1.07 K/UL — SIGNIFICANT CHANGE UP (ref 1–3.3)
LYMPHOCYTES # BLD AUTO: 19.7 % — SIGNIFICANT CHANGE UP (ref 13–44)
MCHC RBC-ENTMCNC: 29.1 PG — SIGNIFICANT CHANGE UP (ref 27–34)
MCHC RBC-ENTMCNC: 33.4 G/DL — SIGNIFICANT CHANGE UP (ref 32–36)
MCV RBC AUTO: 87.2 FL — SIGNIFICANT CHANGE UP (ref 80–100)
MONOCYTES # BLD AUTO: 0.63 K/UL — SIGNIFICANT CHANGE UP (ref 0–0.9)
MONOCYTES NFR BLD AUTO: 11.6 % — SIGNIFICANT CHANGE UP (ref 2–14)
NEUTROPHILS # BLD AUTO: 3.59 K/UL — SIGNIFICANT CHANGE UP (ref 1.8–7.4)
NEUTROPHILS NFR BLD AUTO: 66 % — SIGNIFICANT CHANGE UP (ref 43–77)
NRBC # BLD: 0 /100 WBCS — SIGNIFICANT CHANGE UP (ref 0–0)
NRBC BLD-RTO: 0 /100 WBCS — SIGNIFICANT CHANGE UP (ref 0–0)
PLATELET # BLD AUTO: 233 K/UL — SIGNIFICANT CHANGE UP (ref 150–400)
POTASSIUM SERPL-MCNC: 4.1 MMOL/L — SIGNIFICANT CHANGE UP (ref 3.5–5.3)
POTASSIUM SERPL-SCNC: 4.1 MMOL/L — SIGNIFICANT CHANGE UP (ref 3.5–5.3)
PROT SERPL-MCNC: 7 G/DL — SIGNIFICANT CHANGE UP (ref 6–8.3)
RBC # BLD: 3.98 M/UL — SIGNIFICANT CHANGE UP (ref 3.8–5.2)
RBC # FLD: 12.9 % — SIGNIFICANT CHANGE UP (ref 10.3–14.5)
SODIUM SERPL-SCNC: 129 MMOL/L — LOW (ref 135–145)
WBC # BLD: 5.44 K/UL — SIGNIFICANT CHANGE UP (ref 3.8–10.5)
WBC # FLD AUTO: 5.44 K/UL — SIGNIFICANT CHANGE UP (ref 3.8–10.5)

## 2024-12-18 PROCEDURE — 99442: CPT | Mod: 93

## 2024-12-19 ENCOUNTER — APPOINTMENT (OUTPATIENT)
Dept: PULMONOLOGY | Facility: CLINIC | Age: 64
End: 2024-12-19

## 2024-12-23 ENCOUNTER — APPOINTMENT (OUTPATIENT)
Dept: OTOLARYNGOLOGY | Facility: CLINIC | Age: 64
End: 2024-12-23

## 2024-12-31 ENCOUNTER — APPOINTMENT (OUTPATIENT)
Dept: PULMONOLOGY | Facility: CLINIC | Age: 64
End: 2024-12-31

## 2025-01-02 ENCOUNTER — NON-APPOINTMENT (OUTPATIENT)
Age: 65
End: 2025-01-02

## 2025-01-24 ENCOUNTER — APPOINTMENT (OUTPATIENT)
Dept: RHEUMATOLOGY | Facility: CLINIC | Age: 65
End: 2025-01-24
Payer: MEDICARE

## 2025-01-24 ENCOUNTER — APPOINTMENT (OUTPATIENT)
Dept: RADIOLOGY | Facility: CLINIC | Age: 65
End: 2025-01-24
Payer: MEDICARE

## 2025-01-24 VITALS
TEMPERATURE: 98.5 F | DIASTOLIC BLOOD PRESSURE: 74 MMHG | SYSTOLIC BLOOD PRESSURE: 121 MMHG | HEIGHT: 61 IN | WEIGHT: 134 LBS | RESPIRATION RATE: 16 BRPM | HEART RATE: 72 BPM | BODY MASS INDEX: 25.3 KG/M2 | OXYGEN SATURATION: 96 %

## 2025-01-24 DIAGNOSIS — G89.29 LOW BACK PAIN, UNSPECIFIED: ICD-10-CM

## 2025-01-24 DIAGNOSIS — M79.642 PAIN IN RIGHT HAND: ICD-10-CM

## 2025-01-24 DIAGNOSIS — M25.561 PAIN IN RIGHT KNEE: ICD-10-CM

## 2025-01-24 DIAGNOSIS — G60.9 HEREDITARY AND IDIOPATHIC NEUROPATHY, UNSPECIFIED: ICD-10-CM

## 2025-01-24 DIAGNOSIS — M54.50 LOW BACK PAIN, UNSPECIFIED: ICD-10-CM

## 2025-01-24 DIAGNOSIS — M79.641 PAIN IN RIGHT HAND: ICD-10-CM

## 2025-01-24 DIAGNOSIS — M25.562 PAIN IN RIGHT KNEE: ICD-10-CM

## 2025-01-24 LAB
CCP AB SER IA-ACNC: <8 U/ML
CRP SERPL-MCNC: <3 MG/L
RF+CCP IGG SER-IMP: NEGATIVE
RHEUMATOID FACT SER QL: <10 IU/ML

## 2025-01-24 PROCEDURE — 73130 X-RAY EXAM OF HAND: CPT | Mod: 50

## 2025-01-24 PROCEDURE — 99214 OFFICE O/P EST MOD 30 MIN: CPT

## 2025-01-24 PROCEDURE — G2211 COMPLEX E/M VISIT ADD ON: CPT

## 2025-01-24 RX ORDER — TIZANIDINE 2 MG/1
2 TABLET ORAL
Qty: 30 | Refills: 1 | Status: ACTIVE | COMMUNITY
Start: 2025-01-24 | End: 1900-01-01

## 2025-01-25 LAB — ERYTHROCYTE [SEDIMENTATION RATE] IN BLOOD BY WESTERGREN METHOD: 8 MM/HR

## 2025-01-27 PROBLEM — G60.9 IDIOPATHIC PERIPHERAL NEUROPATHY: Status: ACTIVE | Noted: 2025-01-27

## 2025-02-17 ENCOUNTER — OUTPATIENT (OUTPATIENT)
Dept: OUTPATIENT SERVICES | Facility: HOSPITAL | Age: 65
LOS: 1 days | Discharge: HOME | End: 2025-02-17

## 2025-02-17 DIAGNOSIS — Z98.890 OTHER SPECIFIED POSTPROCEDURAL STATES: Chronic | ICD-10-CM

## 2025-02-17 DIAGNOSIS — Z92.21 PERSONAL HISTORY OF ANTINEOPLASTIC CHEMOTHERAPY: Chronic | ICD-10-CM

## 2025-02-17 DIAGNOSIS — Z90.710 ACQUIRED ABSENCE OF BOTH CERVIX AND UTERUS: Chronic | ICD-10-CM

## 2025-02-19 ENCOUNTER — APPOINTMENT (OUTPATIENT)
Dept: INFUSION THERAPY | Facility: HOSPITAL | Age: 65
End: 2025-02-19

## 2025-02-19 ENCOUNTER — RESULT REVIEW (OUTPATIENT)
Age: 65
End: 2025-02-19

## 2025-02-19 LAB
ALBUMIN SERPL ELPH-MCNC: 4.3 G/DL — SIGNIFICANT CHANGE UP (ref 3.3–5)
ALP SERPL-CCNC: 81 U/L — SIGNIFICANT CHANGE UP (ref 40–120)
ALT FLD-CCNC: 14 U/L — SIGNIFICANT CHANGE UP (ref 10–45)
ANION GAP SERPL CALC-SCNC: 12 MMOL/L — SIGNIFICANT CHANGE UP (ref 5–17)
AST SERPL-CCNC: 18 U/L — SIGNIFICANT CHANGE UP (ref 10–40)
BASOPHILS # BLD AUTO: 0.03 K/UL — SIGNIFICANT CHANGE UP (ref 0–0.2)
BASOPHILS NFR BLD AUTO: 0.8 % — SIGNIFICANT CHANGE UP (ref 0–2)
BILIRUB SERPL-MCNC: 0.2 MG/DL — SIGNIFICANT CHANGE UP (ref 0.2–1.2)
BUN SERPL-MCNC: 10 MG/DL — SIGNIFICANT CHANGE UP (ref 7–23)
CALCIUM SERPL-MCNC: 9 MG/DL — SIGNIFICANT CHANGE UP (ref 8.4–10.5)
CHLORIDE SERPL-SCNC: 101 MMOL/L — SIGNIFICANT CHANGE UP (ref 96–108)
CO2 SERPL-SCNC: 23 MMOL/L — SIGNIFICANT CHANGE UP (ref 22–31)
CREAT SERPL-MCNC: 0.49 MG/DL — LOW (ref 0.5–1.3)
EGFR: 105 ML/MIN/1.73M2 — SIGNIFICANT CHANGE UP
EGFR: 105 ML/MIN/1.73M2 — SIGNIFICANT CHANGE UP
EOSINOPHIL # BLD AUTO: 0.13 K/UL — SIGNIFICANT CHANGE UP (ref 0–0.5)
EOSINOPHIL NFR BLD AUTO: 3.5 % — SIGNIFICANT CHANGE UP (ref 0–6)
GLUCOSE SERPL-MCNC: 108 MG/DL — HIGH (ref 70–99)
HCT VFR BLD CALC: 32.8 % — LOW (ref 34.5–45)
HGB BLD-MCNC: 10.9 G/DL — LOW (ref 11.5–15.5)
IMM GRANULOCYTES NFR BLD AUTO: 0.3 % — SIGNIFICANT CHANGE UP (ref 0–0.9)
LYMPHOCYTES # BLD AUTO: 1.43 K/UL — SIGNIFICANT CHANGE UP (ref 1–3.3)
LYMPHOCYTES # BLD AUTO: 38.8 % — SIGNIFICANT CHANGE UP (ref 13–44)
MCHC RBC-ENTMCNC: 28.8 PG — SIGNIFICANT CHANGE UP (ref 27–34)
MCHC RBC-ENTMCNC: 33.2 G/DL — SIGNIFICANT CHANGE UP (ref 32–36)
MCV RBC AUTO: 86.5 FL — SIGNIFICANT CHANGE UP (ref 80–100)
MONOCYTES # BLD AUTO: 0.34 K/UL — SIGNIFICANT CHANGE UP (ref 0–0.9)
MONOCYTES NFR BLD AUTO: 9.2 % — SIGNIFICANT CHANGE UP (ref 2–14)
NEUTROPHILS # BLD AUTO: 1.75 K/UL — LOW (ref 1.8–7.4)
NEUTROPHILS NFR BLD AUTO: 47.4 % — SIGNIFICANT CHANGE UP (ref 43–77)
NRBC BLD AUTO-RTO: 0 /100 WBCS — SIGNIFICANT CHANGE UP (ref 0–0)
PLATELET # BLD AUTO: 252 K/UL — SIGNIFICANT CHANGE UP (ref 150–400)
POTASSIUM SERPL-MCNC: 4.2 MMOL/L — SIGNIFICANT CHANGE UP (ref 3.5–5.3)
POTASSIUM SERPL-SCNC: 4.2 MMOL/L — SIGNIFICANT CHANGE UP (ref 3.5–5.3)
PROT SERPL-MCNC: 6.8 G/DL — SIGNIFICANT CHANGE UP (ref 6–8.3)
RBC # BLD: 3.79 M/UL — LOW (ref 3.8–5.2)
RBC # FLD: 13.6 % — SIGNIFICANT CHANGE UP (ref 10.3–14.5)
SODIUM SERPL-SCNC: 135 MMOL/L — SIGNIFICANT CHANGE UP (ref 135–145)
WBC # BLD: 3.69 K/UL — LOW (ref 3.8–10.5)
WBC # FLD AUTO: 3.69 K/UL — LOW (ref 3.8–10.5)

## 2025-02-25 ENCOUNTER — APPOINTMENT (OUTPATIENT)
Dept: ORTHOPEDIC SURGERY | Facility: CLINIC | Age: 65
End: 2025-02-25
Payer: MEDICARE

## 2025-02-25 DIAGNOSIS — M17.11 UNILATERAL PRIMARY OSTEOARTHRITIS, RIGHT KNEE: ICD-10-CM

## 2025-02-25 DIAGNOSIS — M17.12 UNILATERAL PRIMARY OSTEOARTHRITIS, LEFT KNEE: ICD-10-CM

## 2025-02-25 PROCEDURE — 73564 X-RAY EXAM KNEE 4 OR MORE: CPT | Mod: 50

## 2025-02-25 PROCEDURE — 99204 OFFICE O/P NEW MOD 45 MIN: CPT

## 2025-02-26 ENCOUNTER — APPOINTMENT (OUTPATIENT)
Dept: RADIOLOGY | Facility: CLINIC | Age: 65
End: 2025-02-26
Payer: MEDICARE

## 2025-02-26 PROCEDURE — 72100 X-RAY EXAM L-S SPINE 2/3 VWS: CPT

## 2025-02-28 DIAGNOSIS — M19.042 PRIMARY OSTEOARTHRITIS, RIGHT HAND: ICD-10-CM

## 2025-02-28 DIAGNOSIS — M19.041 PRIMARY OSTEOARTHRITIS, RIGHT HAND: ICD-10-CM

## 2025-03-07 RX ORDER — DICLOFENAC EPOLAMINE 0.01 G/1
1.3 SYSTEM TOPICAL TWICE DAILY
Qty: 30 | Refills: 1 | Status: ACTIVE | COMMUNITY
Start: 2025-02-25

## 2025-03-31 NOTE — ASU PREOP CHECKLIST - COMMENTS
HEARING AID CHECK    Name:  Bina Herrera  :  [id number]  Age:  89 y.o.  Date of Evaluation:  2018      HISTORY    Reason for visit:  Bina Herrera is seen today for a 2 week hearing aid check.  Patient reports that she has been doing well with her hearing aids.  She reports that she is able to insert and remove them on her own.  She reports that she does not wear them whenever she is home alone, but otherwise seems satisfied with them.     Hearing aid history:  Patient is currently wearing a Behind the Ear (BTE) with custom ear mold hearing aid in both ear(s).     OFFICE VISIT    During today's visit the hearing aids were visually inspected and looked good.  Listening check revealed good sound quality.  Patient was re-instructed on use of the volume control.  It was suggested that the patient change both hearing aid batteries whenever one of them signals low battery as she was having trouble distinguishing which hearing aid the signal was coming from.  The patient will return in two weeks for a hearing aid check before the end of her trial period.  If the patient is still satisfied with the hearing aids, then she was instructed that she could cancel the appointment.  It was recommended that the patient follow-up every 6 months to have the hearing aids cleaned and tubing replaced.    It was a pleasure seeing Bina Herrera in Audiology today.  It is a pleasure helping Ms. Herrera with her amplification needs.          This document has been electronically signed by CHUCKY Martinez on May 11, 2018 9:00 AM        CHUCKY Martinez  Licensed Audiologist    For Billing and Codin  Hearing Aid Check, Binaural - no charge  
Pt lvm asking to schedule elbow sx.     Spoke with patient. Patient would like surgery on 4/24/25. Patient will come in for preop on 4/8/25 to sign paperwork and do lab work.     
npo

## 2025-04-03 ENCOUNTER — RESULT REVIEW (OUTPATIENT)
Age: 65
End: 2025-04-03

## 2025-04-03 ENCOUNTER — NON-APPOINTMENT (OUTPATIENT)
Age: 65
End: 2025-04-03

## 2025-04-03 ENCOUNTER — APPOINTMENT (OUTPATIENT)
Dept: INFUSION THERAPY | Facility: HOSPITAL | Age: 65
End: 2025-04-03

## 2025-04-03 ENCOUNTER — APPOINTMENT (OUTPATIENT)
Dept: HEMATOLOGY ONCOLOGY | Facility: CLINIC | Age: 65
End: 2025-04-03
Payer: MEDICARE

## 2025-04-03 VITALS
RESPIRATION RATE: 16 BRPM | OXYGEN SATURATION: 99 % | BODY MASS INDEX: 25.83 KG/M2 | DIASTOLIC BLOOD PRESSURE: 77 MMHG | SYSTOLIC BLOOD PRESSURE: 118 MMHG | WEIGHT: 136.68 LBS | HEART RATE: 53 BPM | TEMPERATURE: 97.9 F

## 2025-04-03 DIAGNOSIS — D72.819 DECREASED WHITE BLOOD CELL COUNT, UNSPECIFIED: ICD-10-CM

## 2025-04-03 DIAGNOSIS — C50.919 MALIGNANT NEOPLASM OF UNSPECIFIED SITE OF UNSPECIFIED FEMALE BREAST: ICD-10-CM

## 2025-04-03 LAB
ALBUMIN SERPL ELPH-MCNC: 4.4 G/DL — SIGNIFICANT CHANGE UP (ref 3.3–5)
ALP SERPL-CCNC: 71 U/L — SIGNIFICANT CHANGE UP (ref 40–120)
ALT FLD-CCNC: 16 U/L — SIGNIFICANT CHANGE UP (ref 10–45)
ANION GAP SERPL CALC-SCNC: 12 MMOL/L — SIGNIFICANT CHANGE UP (ref 5–17)
AST SERPL-CCNC: 23 U/L — SIGNIFICANT CHANGE UP (ref 10–40)
BASOPHILS # BLD AUTO: 0.03 K/UL — SIGNIFICANT CHANGE UP (ref 0–0.2)
BASOPHILS NFR BLD AUTO: 0.7 % — SIGNIFICANT CHANGE UP (ref 0–2)
BILIRUB SERPL-MCNC: 0.2 MG/DL — SIGNIFICANT CHANGE UP (ref 0.2–1.2)
BUN SERPL-MCNC: 12 MG/DL — SIGNIFICANT CHANGE UP (ref 7–23)
CALCIUM SERPL-MCNC: 9.5 MG/DL — SIGNIFICANT CHANGE UP (ref 8.4–10.5)
CHLORIDE SERPL-SCNC: 99 MMOL/L — SIGNIFICANT CHANGE UP (ref 96–108)
CO2 SERPL-SCNC: 26 MMOL/L — SIGNIFICANT CHANGE UP (ref 22–31)
CREAT SERPL-MCNC: 0.58 MG/DL — SIGNIFICANT CHANGE UP (ref 0.5–1.3)
EGFR: 100 ML/MIN/1.73M2 — SIGNIFICANT CHANGE UP
EGFR: 100 ML/MIN/1.73M2 — SIGNIFICANT CHANGE UP
EOSINOPHIL # BLD AUTO: 0.09 K/UL — SIGNIFICANT CHANGE UP (ref 0–0.5)
EOSINOPHIL NFR BLD AUTO: 2 % — SIGNIFICANT CHANGE UP (ref 0–6)
GLUCOSE SERPL-MCNC: 102 MG/DL — HIGH (ref 70–99)
HCT VFR BLD CALC: 36.9 % — SIGNIFICANT CHANGE UP (ref 34.5–45)
HGB BLD-MCNC: 12.2 G/DL — SIGNIFICANT CHANGE UP (ref 11.5–15.5)
IMM GRANULOCYTES NFR BLD AUTO: 0.4 % — SIGNIFICANT CHANGE UP (ref 0–0.9)
INR BLD: 1.05 RATIO — SIGNIFICANT CHANGE UP (ref 0.85–1.16)
LYMPHOCYTES # BLD AUTO: 1.53 K/UL — SIGNIFICANT CHANGE UP (ref 1–3.3)
LYMPHOCYTES # BLD AUTO: 33.3 % — SIGNIFICANT CHANGE UP (ref 13–44)
MCHC RBC-ENTMCNC: 29.2 PG — SIGNIFICANT CHANGE UP (ref 27–34)
MCHC RBC-ENTMCNC: 33.1 G/DL — SIGNIFICANT CHANGE UP (ref 32–36)
MCV RBC AUTO: 88.3 FL — SIGNIFICANT CHANGE UP (ref 80–100)
MONOCYTES # BLD AUTO: 0.36 K/UL — SIGNIFICANT CHANGE UP (ref 0–0.9)
MONOCYTES NFR BLD AUTO: 7.8 % — SIGNIFICANT CHANGE UP (ref 2–14)
NEUTROPHILS # BLD AUTO: 2.56 K/UL — SIGNIFICANT CHANGE UP (ref 1.8–7.4)
NEUTROPHILS NFR BLD AUTO: 55.8 % — SIGNIFICANT CHANGE UP (ref 43–77)
NRBC BLD AUTO-RTO: 0 /100 WBCS — SIGNIFICANT CHANGE UP (ref 0–0)
PLATELET # BLD AUTO: 246 K/UL — SIGNIFICANT CHANGE UP (ref 150–400)
POTASSIUM SERPL-MCNC: 4.1 MMOL/L — SIGNIFICANT CHANGE UP (ref 3.5–5.3)
POTASSIUM SERPL-SCNC: 4.1 MMOL/L — SIGNIFICANT CHANGE UP (ref 3.5–5.3)
PROT SERPL-MCNC: 7.2 G/DL — SIGNIFICANT CHANGE UP (ref 6–8.3)
PROTHROM AB SERPL-ACNC: 12.4 SEC — SIGNIFICANT CHANGE UP (ref 9.9–13.4)
RBC # BLD: 4.18 M/UL — SIGNIFICANT CHANGE UP (ref 3.8–5.2)
RBC # FLD: 13.2 % — SIGNIFICANT CHANGE UP (ref 10.3–14.5)
SODIUM SERPL-SCNC: 137 MMOL/L — SIGNIFICANT CHANGE UP (ref 135–145)
WBC # BLD: 4.59 K/UL — SIGNIFICANT CHANGE UP (ref 3.8–10.5)
WBC # FLD AUTO: 4.59 K/UL — SIGNIFICANT CHANGE UP (ref 3.8–10.5)

## 2025-04-03 PROCEDURE — 99205 OFFICE O/P NEW HI 60 MIN: CPT

## 2025-04-03 PROCEDURE — G2211 COMPLEX E/M VISIT ADD ON: CPT

## 2025-04-08 ENCOUNTER — APPOINTMENT (OUTPATIENT)
Dept: ULTRASOUND IMAGING | Facility: CLINIC | Age: 65
End: 2025-04-08
Payer: MEDICARE

## 2025-04-08 PROCEDURE — 76536 US EXAM OF HEAD AND NECK: CPT

## 2025-04-29 ENCOUNTER — OUTPATIENT (OUTPATIENT)
Dept: OUTPATIENT SERVICES | Facility: HOSPITAL | Age: 65
LOS: 1 days | Discharge: HOME | End: 2025-04-29

## 2025-04-29 ENCOUNTER — APPOINTMENT (OUTPATIENT)
Age: 65
End: 2025-04-29
Payer: MEDICARE

## 2025-04-29 DIAGNOSIS — Z98.890 OTHER SPECIFIED POSTPROCEDURAL STATES: Chronic | ICD-10-CM

## 2025-04-29 DIAGNOSIS — C50.919 MALIGNANT NEOPLASM OF UNSPECIFIED SITE OF UNSPECIFIED FEMALE BREAST: ICD-10-CM

## 2025-04-29 DIAGNOSIS — Z92.21 PERSONAL HISTORY OF ANTINEOPLASTIC CHEMOTHERAPY: Chronic | ICD-10-CM

## 2025-04-29 DIAGNOSIS — Z90.710 ACQUIRED ABSENCE OF BOTH CERVIX AND UTERUS: Chronic | ICD-10-CM

## 2025-04-29 PROCEDURE — 99214 OFFICE O/P EST MOD 30 MIN: CPT

## 2025-04-29 RX ORDER — TIZANIDINE 2 MG/1
2 TABLET ORAL EVERY 6 HOURS
Qty: 56 | Refills: 1 | Status: ACTIVE | COMMUNITY
Start: 2025-04-29 | End: 1900-01-01

## 2025-04-29 RX ORDER — MELOXICAM 7.5 MG/1
7.5 TABLET ORAL
Qty: 28 | Refills: 0 | Status: ACTIVE | COMMUNITY
Start: 2025-04-29 | End: 1900-01-01

## 2025-05-01 ENCOUNTER — APPOINTMENT (OUTPATIENT)
Dept: HEMATOLOGY ONCOLOGY | Facility: CLINIC | Age: 65
End: 2025-05-01

## 2025-05-01 DIAGNOSIS — D72.819 DECREASED WHITE BLOOD CELL COUNT, UNSPECIFIED: ICD-10-CM

## 2025-05-01 DIAGNOSIS — C50.919 MALIGNANT NEOPLASM OF UNSPECIFIED SITE OF UNSPECIFIED FEMALE BREAST: ICD-10-CM

## 2025-05-02 ENCOUNTER — APPOINTMENT (OUTPATIENT)
Age: 65
End: 2025-05-02
Payer: MEDICARE

## 2025-05-02 DIAGNOSIS — M54.50 LOW BACK PAIN, UNSPECIFIED: ICD-10-CM

## 2025-05-02 PROCEDURE — 99214 OFFICE O/P EST MOD 30 MIN: CPT

## 2025-05-02 RX ORDER — TIZANIDINE 2 MG/1
2 TABLET ORAL EVERY 6 HOURS
Qty: 56 | Refills: 1 | Status: ACTIVE | COMMUNITY
Start: 2025-05-02 | End: 1900-01-01

## 2025-05-06 ENCOUNTER — APPOINTMENT (OUTPATIENT)
Dept: MRI IMAGING | Facility: CLINIC | Age: 65
End: 2025-05-06

## 2025-05-07 RX ORDER — MELOXICAM 7.5 MG/1
7.5 TABLET ORAL
Qty: 28 | Refills: 1 | Status: ACTIVE | COMMUNITY
Start: 2025-05-07 | End: 1900-01-01

## 2025-05-13 ENCOUNTER — APPOINTMENT (OUTPATIENT)
Age: 65
End: 2025-05-13

## 2025-06-02 ENCOUNTER — RESULT REVIEW (OUTPATIENT)
Age: 65
End: 2025-06-02

## 2025-06-02 ENCOUNTER — APPOINTMENT (OUTPATIENT)
Dept: INFUSION THERAPY | Facility: HOSPITAL | Age: 65
End: 2025-06-02

## 2025-06-02 LAB
ALBUMIN SERPL ELPH-MCNC: 4.8 G/DL — SIGNIFICANT CHANGE UP (ref 3.3–5)
ALP SERPL-CCNC: 87 U/L — SIGNIFICANT CHANGE UP (ref 40–120)
ALT FLD-CCNC: 20 U/L — SIGNIFICANT CHANGE UP (ref 10–45)
ANION GAP SERPL CALC-SCNC: 12 MMOL/L — SIGNIFICANT CHANGE UP (ref 5–17)
AST SERPL-CCNC: 22 U/L — SIGNIFICANT CHANGE UP (ref 10–40)
BILIRUB SERPL-MCNC: 0.2 MG/DL — SIGNIFICANT CHANGE UP (ref 0.2–1.2)
BUN SERPL-MCNC: 11 MG/DL — SIGNIFICANT CHANGE UP (ref 7–23)
CALCIUM SERPL-MCNC: 9.8 MG/DL — SIGNIFICANT CHANGE UP (ref 8.4–10.5)
CHLORIDE SERPL-SCNC: 96 MMOL/L — SIGNIFICANT CHANGE UP (ref 96–108)
CO2 SERPL-SCNC: 26 MMOL/L — SIGNIFICANT CHANGE UP (ref 22–31)
CREAT SERPL-MCNC: 0.55 MG/DL — SIGNIFICANT CHANGE UP (ref 0.5–1.3)
EGFR: 102 ML/MIN/1.73M2 — SIGNIFICANT CHANGE UP
EGFR: 102 ML/MIN/1.73M2 — SIGNIFICANT CHANGE UP
GLUCOSE SERPL-MCNC: 83 MG/DL — SIGNIFICANT CHANGE UP (ref 70–99)
HCT VFR BLD CALC: 38.6 % — SIGNIFICANT CHANGE UP (ref 34.5–45)
HGB BLD-MCNC: 12.8 G/DL — SIGNIFICANT CHANGE UP (ref 11.5–15.5)
INR BLD: 0.95 RATIO — SIGNIFICANT CHANGE UP (ref 0.85–1.16)
MCHC RBC-ENTMCNC: 28.4 PG — SIGNIFICANT CHANGE UP (ref 27–34)
MCHC RBC-ENTMCNC: 33.2 G/DL — SIGNIFICANT CHANGE UP (ref 32–36)
MCV RBC AUTO: 85.6 FL — SIGNIFICANT CHANGE UP (ref 80–100)
PLATELET # BLD AUTO: 276 K/UL — SIGNIFICANT CHANGE UP (ref 150–400)
POTASSIUM SERPL-MCNC: 4.2 MMOL/L — SIGNIFICANT CHANGE UP (ref 3.5–5.3)
POTASSIUM SERPL-SCNC: 4.2 MMOL/L — SIGNIFICANT CHANGE UP (ref 3.5–5.3)
PROT SERPL-MCNC: 7.7 G/DL — SIGNIFICANT CHANGE UP (ref 6–8.3)
PROTHROM AB SERPL-ACNC: 11.2 SEC — SIGNIFICANT CHANGE UP (ref 9.9–13.4)
RBC # BLD: 4.51 M/UL — SIGNIFICANT CHANGE UP (ref 3.8–5.2)
RBC # FLD: 13.2 % — SIGNIFICANT CHANGE UP (ref 10.3–14.5)
SODIUM SERPL-SCNC: 134 MMOL/L — LOW (ref 135–145)
WBC # BLD: 4.44 K/UL — SIGNIFICANT CHANGE UP (ref 3.8–10.5)
WBC # FLD AUTO: 4.44 K/UL — SIGNIFICANT CHANGE UP (ref 3.8–10.5)

## 2025-06-04 ENCOUNTER — NON-APPOINTMENT (OUTPATIENT)
Age: 65
End: 2025-06-04

## 2025-06-05 ENCOUNTER — RX RENEWAL (OUTPATIENT)
Age: 65
End: 2025-06-05

## 2025-06-09 NOTE — H&P PST ADULT - ABILITY TO HEAR (WITH HEARING AID OR HEARING APPLIANCE IF NORMALLY USED):
Specialty Pharmacy Patient Management Program  Refill Outreach     Alla was contacted today regarding refills of their medication(s).    Refill Questions      Flowsheet Row Most Recent Value   Changes to allergies? No   Changes to medications? No   New conditions or infections since last clinic visit No   Unplanned office visit, urgent care, ED, or hospital admission in the last 4 weeks  No   How does patient/caregiver feel medication is working? Very good   Financial problems or insurance changes  No   Since the previous refill, were any specialty medication doses or scheduled injections missed or delayed?  No   Does this patient require a clinical escalation to a pharmacist? No            Delivery Questions      Flowsheet Row Most Recent Value   Delivery method [employer]   Delivery address verified with patient/caregiver? Yes   Delivery address Home   Other address preferred n/a   Number of medications in delivery 1   Medication(s) being filled and delivered Ubrogepant (UBRELVY)   Doses left of specialty medications 0   Copay verified? Yes   Copay amount $0   Copay form of payment No copayment ($0)   Delivery Date Selection 06/10/25   Signature Required No   Do you consent to receive electronic handouts?  Yes                 Follow-up: 30 day(s)     Anjana Chen, Pharmacy Technician  6/9/2025  10:20 EDT    
Adequate: hears normal conversation without difficulty

## 2025-06-27 ENCOUNTER — RX RENEWAL (OUTPATIENT)
Age: 65
End: 2025-06-27

## 2025-07-08 ENCOUNTER — APPOINTMENT (OUTPATIENT)
Age: 65
End: 2025-07-08

## 2025-07-28 ENCOUNTER — RESULT REVIEW (OUTPATIENT)
Age: 65
End: 2025-07-28

## 2025-07-28 ENCOUNTER — APPOINTMENT (OUTPATIENT)
Dept: INFUSION THERAPY | Facility: HOSPITAL | Age: 65
End: 2025-07-28

## 2025-07-29 ENCOUNTER — RX RENEWAL (OUTPATIENT)
Age: 65
End: 2025-07-29

## 2025-08-18 ENCOUNTER — APPOINTMENT (OUTPATIENT)
Dept: ENDOVASCULAR SURGERY | Facility: CLINIC | Age: 65
End: 2025-08-18
Payer: MEDICARE

## 2025-08-18 ENCOUNTER — RESULT REVIEW (OUTPATIENT)
Age: 65
End: 2025-08-18

## 2025-08-18 VITALS
RESPIRATION RATE: 18 BRPM | DIASTOLIC BLOOD PRESSURE: 73 MMHG | HEART RATE: 50 BPM | OXYGEN SATURATION: 100 % | TEMPERATURE: 98.1 F | SYSTOLIC BLOOD PRESSURE: 152 MMHG | BODY MASS INDEX: 25.11 KG/M2 | HEIGHT: 61 IN | WEIGHT: 133 LBS

## 2025-08-18 PROCEDURE — 77001 FLUOROGUIDE FOR VEIN DEVICE: CPT

## 2025-08-18 PROCEDURE — 36590 REMOVAL TUNNELED CV CATH: CPT | Mod: RT

## 2025-08-22 ENCOUNTER — APPOINTMENT (OUTPATIENT)
Dept: PULMONOLOGY | Facility: CLINIC | Age: 65
End: 2025-08-22

## (undated) DEVICE — FOR-ESU VALLEYLAB T7E15008DX: Type: DURABLE MEDICAL EQUIPMENT

## (undated) DEVICE — DRSG MAMMARY SUPPORT MED SIZE 3

## (undated) DEVICE — SUT SOFSILK 2-0 18" C-23

## (undated) DEVICE — DRSG CURITY GAUZE SPONGE 4 X 4" 12-PLY

## (undated) DEVICE — MASK SURGICAL WITH EYESHIELD ANTIFOG (ORANGE)

## (undated) DEVICE — VALVE BIOPSY BRONCHOVIDEOSCOPE

## (undated) DEVICE — DRAPE TOWEL BLUE 17" X 24"

## (undated) DEVICE — LABELS BLANK W PEN

## (undated) DEVICE — DRAPE ULTRASOUND PROBE COVER TELESCOPE  5X72"

## (undated) DEVICE — ADAPTER FIBEROPTIC BRONCHOSCOPE DUAL AXIS SWIVEL

## (undated) DEVICE — DRAPE LAPAROTOMY TRANSVERSE

## (undated) DEVICE — BLANKET WARMER LOWER ADULT

## (undated) DEVICE — VENODYNE/SCD SLEEVE CALF MEDIUM

## (undated) DEVICE — DRAIN RESERVOIR FOR JACKSON PRATT 100CC CARDINAL

## (undated) DEVICE — SPONGE DISSECTOR PEANUT

## (undated) DEVICE — DRSG TAPE TRANSPORE 1"

## (undated) DEVICE — SOL IRR NS 0.9% 250ML

## (undated) DEVICE — SOL ANTI FOG

## (undated) DEVICE — TRAP SPECIMEN SPUTUM 40CC

## (undated) DEVICE — WRAP COMPRESSION CALF MED

## (undated) DEVICE — SUT BIOSYN 4-0 18" P-12

## (undated) DEVICE — ELCTR BOVIE BLADE EXTENDED 6.5"

## (undated) DEVICE — TUBING CANNULA SALTER LABS NASAL ADULT 7FT

## (undated) DEVICE — FOR-ESU VALLEYLAB T7E14999DX: Type: DURABLE MEDICAL EQUIPMENT

## (undated) DEVICE — DRAIN JACKSON PRATT 10MM FLAT FULL NO TROCAR

## (undated) DEVICE — CONTAINER SPECIMEN 100ML

## (undated) DEVICE — SOL IRR POUR NS 0.9% 500ML

## (undated) DEVICE — WARMING BLANKET LOWER ADULT

## (undated) DEVICE — DRSG MASTISOL

## (undated) DEVICE — DRAIN BLAKE 15FR BARD CHANNEL

## (undated) DEVICE — DRSG STERISTRIPS 0.5X4"

## (undated) DEVICE — SYR LUER SLIP TIP 30CC

## (undated) DEVICE — GLV 7 PROTEXIS (WHITE)

## (undated) DEVICE — SUT POLYSORB 3-0 30" V-20 UNDYED

## (undated) DEVICE — DRSG COMBINE 5X9"

## (undated) DEVICE — NDL ASPIRATION VIZISHOT2 22G

## (undated) DEVICE — FORCEP BIOPSY 1.8MM JAW X 100CM DISP

## (undated) DEVICE — FORCEP BIOPSY BRONCHOSCOPE DISP

## (undated) DEVICE — SOL INJ NS 0.9% 100ML

## (undated) DEVICE — BALLOON SINGLE FOR BF-UC160F

## (undated) DEVICE — PACK MAJOR ABDOMINAL WITH LAP

## (undated) DEVICE — STOPCOCK 4-WAY (BLUE) DISCOFIX SPIN-LOCK CONNECTOR

## (undated) DEVICE — GLV 7.5 ESTEEM BLUE

## (undated) DEVICE — DRSG KERLIX ROLL 4.5"

## (undated) DEVICE — GLV 7 PROTEXIS

## (undated) DEVICE — SYR LUER LOK 10CC

## (undated) DEVICE — DRAPE LIGHT HANDLE COVER BLUE

## (undated) DEVICE — DRSG 4X4

## (undated) DEVICE — NDL ENDOBRONCHIAL ULTRASOUND FINE BIOPSY DEVICE 25GA

## (undated) DEVICE — PACK BRONCHOSCOPY

## (undated) DEVICE — DRAIN JACKSON PRATT 10MM FLAT 3/4 NO TROCAR

## (undated) DEVICE — VALVE SUCTION EVIS 160/200/240

## (undated) DEVICE — SOL IRR POUR H2O 250ML

## (undated) DEVICE — DRAPE HALF SHEET 40X57"

## (undated) DEVICE — SOL IRR POUR H2O 500ML

## (undated) DEVICE — DRAPE 3/4 SHEET 52X76"

## (undated) DEVICE — BRUSH CYTO DISP

## (undated) DEVICE — SYR LUER LOK 20CC

## (undated) DEVICE — SUTURE REMOVAL KIT